# Patient Record
Sex: FEMALE | Race: WHITE | Employment: OTHER | URBAN - METROPOLITAN AREA
[De-identification: names, ages, dates, MRNs, and addresses within clinical notes are randomized per-mention and may not be internally consistent; named-entity substitution may affect disease eponyms.]

---

## 2020-02-10 ENCOUNTER — OFFICE VISIT (OUTPATIENT)
Dept: FAMILY MEDICINE CLINIC | Age: 65
End: 2020-02-10
Payer: COMMERCIAL

## 2020-02-10 VITALS
OXYGEN SATURATION: 96 % | HEART RATE: 74 BPM | HEIGHT: 65 IN | SYSTOLIC BLOOD PRESSURE: 107 MMHG | BODY MASS INDEX: 42.15 KG/M2 | DIASTOLIC BLOOD PRESSURE: 66 MMHG | RESPIRATION RATE: 16 BRPM | WEIGHT: 253 LBS

## 2020-02-10 PROCEDURE — 99202 OFFICE O/P NEW SF 15 MIN: CPT | Performed by: FAMILY MEDICINE

## 2020-02-10 RX ORDER — PANTOPRAZOLE SODIUM 40 MG/1
40 TABLET, DELAYED RELEASE ORAL DAILY
Qty: 90 TABLET | Refills: 1 | Status: SHIPPED | OUTPATIENT
Start: 2020-02-10 | End: 2020-06-16 | Stop reason: SDUPTHER

## 2020-02-10 RX ORDER — FOLIC ACID/MULTIVIT,IRON,MINER 0.4MG-18MG
500 TABLET ORAL
COMMUNITY

## 2020-02-10 RX ORDER — AMLODIPINE BESYLATE 5 MG/1
5 TABLET ORAL DAILY
COMMUNITY
Start: 2020-01-30 | End: 2020-02-10 | Stop reason: SDUPTHER

## 2020-02-10 RX ORDER — AMLODIPINE BESYLATE 5 MG/1
5 TABLET ORAL DAILY
Qty: 90 TABLET | Refills: 1 | Status: SHIPPED | OUTPATIENT
Start: 2020-02-10 | End: 2020-06-16 | Stop reason: SDUPTHER

## 2020-02-10 RX ORDER — ATENOLOL 50 MG/1
50 TABLET ORAL DAILY
COMMUNITY
Start: 2020-01-08 | End: 2020-02-10 | Stop reason: SDUPTHER

## 2020-02-10 RX ORDER — PANTOPRAZOLE SODIUM 40 MG/1
40 TABLET, DELAYED RELEASE ORAL DAILY
COMMUNITY
Start: 2019-12-22 | End: 2020-02-10 | Stop reason: SDUPTHER

## 2020-02-10 RX ORDER — ATENOLOL 50 MG/1
50 TABLET ORAL DAILY
Qty: 90 TABLET | Refills: 1 | Status: SHIPPED | OUTPATIENT
Start: 2020-02-10 | End: 2020-06-16 | Stop reason: SDUPTHER

## 2020-02-10 RX ORDER — PIOGLITAZONEHYDROCHLORIDE 30 MG/1
30 TABLET ORAL DAILY
Qty: 90 TABLET | Refills: 1 | Status: SHIPPED | OUTPATIENT
Start: 2020-02-10 | End: 2020-06-16 | Stop reason: SDUPTHER

## 2020-02-10 RX ORDER — PIOGLITAZONEHYDROCHLORIDE 30 MG/1
30 TABLET ORAL DAILY
COMMUNITY
Start: 2020-01-30 | End: 2020-02-10 | Stop reason: SDUPTHER

## 2020-02-10 SDOH — ECONOMIC STABILITY: TRANSPORTATION INSECURITY
IN THE PAST 12 MONTHS, HAS THE LACK OF TRANSPORTATION KEPT YOU FROM MEDICAL APPOINTMENTS OR FROM GETTING MEDICATIONS?: NO

## 2020-02-10 SDOH — ECONOMIC STABILITY: FOOD INSECURITY: WITHIN THE PAST 12 MONTHS, YOU WORRIED THAT YOUR FOOD WOULD RUN OUT BEFORE YOU GOT MONEY TO BUY MORE.: NEVER TRUE

## 2020-02-10 SDOH — ECONOMIC STABILITY: INCOME INSECURITY: HOW HARD IS IT FOR YOU TO PAY FOR THE VERY BASICS LIKE FOOD, HOUSING, MEDICAL CARE, AND HEATING?: NOT HARD AT ALL

## 2020-02-10 SDOH — ECONOMIC STABILITY: FOOD INSECURITY: WITHIN THE PAST 12 MONTHS, THE FOOD YOU BOUGHT JUST DIDN'T LAST AND YOU DIDN'T HAVE MONEY TO GET MORE.: NEVER TRUE

## 2020-02-10 SDOH — ECONOMIC STABILITY: TRANSPORTATION INSECURITY
IN THE PAST 12 MONTHS, HAS LACK OF TRANSPORTATION KEPT YOU FROM MEETINGS, WORK, OR FROM GETTING THINGS NEEDED FOR DAILY LIVING?: NO

## 2020-02-10 ASSESSMENT — ENCOUNTER SYMPTOMS
RESPIRATORY NEGATIVE: 1
GASTROINTESTINAL NEGATIVE: 1
EYES NEGATIVE: 1

## 2020-02-10 ASSESSMENT — PATIENT HEALTH QUESTIONNAIRE - PHQ9
SUM OF ALL RESPONSES TO PHQ QUESTIONS 1-9: 0
SUM OF ALL RESPONSES TO PHQ QUESTIONS 1-9: 0
SUM OF ALL RESPONSES TO PHQ9 QUESTIONS 1 & 2: 0
1. LITTLE INTEREST OR PLEASURE IN DOING THINGS: 0
2. FEELING DOWN, DEPRESSED OR HOPELESS: 0

## 2020-02-10 NOTE — PATIENT INSTRUCTIONS
Patient Education        Counting Carbohydrates: Care Instructions  Your Care Instructions    You don't have to eat special foods when you have diabetes. You just have to be careful to eat healthy foods. Carbohydrates (carbs) raise blood sugar higher and quicker than any other nutrient. Carbs are found in desserts, breads and cereals, and fruit. They're also in starchy vegetables. These include potatoes, corn, and grains such as rice and pasta. Carbs are also in milk and yogurt. The more carbs you eat at one time, the higher your blood sugar will rise. Spreading carbs all through the day helps keep your blood sugar levels within your target range. Counting carbs is one of the best ways to keep your blood sugar under control. If you use insulin, counting carbs helps you match the right amount of insulin to the number of grams of carbs in a meal. Then you can change your diet and insulin dose as needed. Testing your blood sugar several times a day can help you learn how carbs affect your blood sugar. A registered dietitian or certified diabetes educator can help you plan meals and snacks. Follow-up care is a key part of your treatment and safety. Be sure to make and go to all appointments, and call your doctor if you are having problems. It's also a good idea to know your test results and keep a list of the medicines you take. How can you care for yourself at home? Know your daily amount of carbohydrates  Your daily amount depends on several things, such as your weight, how active you are, which diabetes medicines you take, and what your goals are for your blood sugar levels. A registered dietitian or certified diabetes educator can help you plan how many carbs to include in each meal and snack. For most adults, a guideline for the daily amount of carbs is:  · 45 to 60 grams at each meal. That's about the same as 3 to 4 carbohydrate servings. · 15 to 20 grams at each snack.  That's about the same as 1 carbohydrate serving. Count carbs  Counting carbs lets you know how much rapid-acting insulin to take before you eat. If you use an insulin pump, you get a constant rate of insulin during the day. So the pump must be programmed at meals. This gives you extra insulin to cover the rise in blood sugar after meals. If you take insulin:  · Learn your own insulin-to-carb ratio. You and your diabetes health professional will figure out the ratio. You can do this by testing your blood sugar after meals. For example, you may need a certain amount of insulin for every 15 grams of carbs. · Add up the carb grams in a meal. Then you can figure out how many units of insulin to take based on your insulin-to-carb ratio. · Exercise lowers blood sugar. You can use less insulin than you would if you were not doing exercise. Keep in mind that timing matters. If you exercise within 1 hour after a meal, your body may need less insulin for that meal than it would if you exercised 3 hours after the meal. Test your blood sugar to find out how exercise affects your need for insulin. If you do or don't take insulin:  · Look at labels on packaged foods. This can tell you how many carbs are in a serving. You can also use guides from the American Diabetes Association. · Be aware of portions, or serving sizes. If a package has two servings and you eat the whole package, you need to double the number of grams of carbohydrate listed for one serving. · Protein, fat, and fiber do not raise blood sugar as much as carbs do. If you eat a lot of these nutrients in a meal, your blood sugar will rise more slowly than it would otherwise. Eat from all food groups  · Eat at least three meals a day. · Plan meals to include food from all the food groups. The food groups include grains, fruits, dairy, proteins, and vegetables. · Talk to your dietitian or diabetes educator about ways to add limited amounts of sweets into your meal plan.   · If you drink alcohol, talk to your doctor. It may not be recommended when you are taking certain diabetes medicines. Where can you learn more? Go to https://[email or web address].[email or web address]. org and sign in to your [employer] account. Enter E654 in the [employer] box to learn more about \"Counting Carbohydrates: Care Instructions. \"     If you do not have an account, please click on the \"Sign Up Now\" link. Current as of: April 16, 2019  Content Version: 12.3  © 5397-6799 Healthwise, Incorporated. Care instructions adapted under license by [facility] ([facility]). If you have questions about a medical condition or this instruction, always ask your healthcare professional. [address] any warranty or liability for your use of this information.

## 2020-02-10 NOTE — PROGRESS NOTES
[address] PRIMARY CARE  [address] [facility] [address]  Dept: [phone]  Dept Fax: [phone]    Nahomy Perrin is a 60 y.o. female who presents today for her medical conditions/complaints as noted below. Nahomy Perrin is c/o of   Chief Complaint   Patient presents with    New Patient         HPI:     The patient complains of intermittent muscle spasms, in her calves, arms, feet. The patient states it comes and goes and travels. Yesterday it was her left wrist.  She states the only thing that's different is her diet has been poor. She recently moved from [place]. Diabetes   She presents for her initial diabetic visit. She has type 2 diabetes mellitus. Her disease course has been stable. There are no hypoglycemic associated symptoms. There are no diabetic associated symptoms. There are no hypoglycemic complications. There are no diabetic complications. Risk factors for coronary artery disease include diabetes mellitus. Current diabetic treatment includes oral agent (dual therapy). She is compliant with treatment all of the time. No results found for: LABA1C          ( goal A1Cis < 7)   No results found for: LABMICR  No results found for: LDLCHOLESTEROL, LDLCALC    (goal LDL is <100)   No results found for: AST, ALT, BUN  BP Readings from Last 3 Encounters:   02/10/20 107/66          (goal 120/80)    No past medical history on file.    Past Surgical History:   Procedure Laterality Date    CHOLECYSTECTOMY      KNEE ARTHROSCOPY Left 1995    OTHER SURGICAL HISTORY      DNC for post menapausal bleeding    TONSILLECTOMY         Family History   Problem Relation Age of Onset    Breast Cancer Mother     Heart Defect Mother         Murmur     Alzheimer's Disease Mother     Diabetes type 2  Mother     Heart Disease Father     Other Father         AA       Social History     Tobacco Use    Smoking status: Former Smoker     Packs/day: 2.00 Years: 44.00     Pack years: 88.00     Last attempt to quit: 2019     Years since quittin.6    Smokeless tobacco: Never Used   Substance Use Topics    Alcohol use: Not Currently      Current Outpatient Medications   Medication Sig Dispense Refill    amLODIPine (NORVASC) 5 MG tablet Take 5 mg by mouth daily      atenolol (TENORMIN) 50 MG tablet Take 50 mg by mouth daily      pantoprazole (PROTONIX) 40 MG tablet Take 40 mg by mouth daily      pioglitazone (ACTOS) 30 MG tablet Take 30 mg by mouth daily      metFORMIN (GLUCOPHAGE) 500 MG tablet Take 500 mg by mouth 2 times daily (with meals)      aspirin 81 MG tablet Take 81 mg by mouth daily      Krill Oil 350 MG CAPS Take by mouth       No current facility-administered medications for this visit. Allergies   Allergen Reactions    Augmentin [Amoxicillin-Pot Clavulanate] Nausea And Vomiting    Bactrim [Sulfamethoxazole-Trimethoprim] Nausea And Vomiting    Vancomycin Rash    Dolobid [Diflunisal] Nausea And Vomiting       Health Maintenance   Topic Date Due    Hepatitis C screen  1955    DTaP/Tdap/Td vaccine (1 - Tdap) 1968    HIV screen  1972    Cervical cancer screen  1978    Lipid screen  1997    Breast cancer screen  1997    Diabetes screen  1997    Shingles Vaccine (1 of 2) 2007    Colon cancer screen colonoscopy  2007    Low dose CT lung screening  2012    Flu vaccine (1) 2019    Hepatitis A vaccine  Aged Out    Hepatitis B vaccine  Aged Out    Hib vaccine  Aged Out    Meningococcal (ACWY) vaccine  Aged Out    Pneumococcal 0-64 years Vaccine  Aged Out       Subjective:     Review of Systems   Constitutional: Negative. HENT: Negative. Eyes: Negative. Respiratory: Negative. Cardiovascular: Negative. Gastrointestinal: Negative. Genitourinary: Negative. Musculoskeletal: Negative. Skin: Negative.     Allergic/Immunologic: Negative for environmental allergies, food allergies and immunocompromised state. Neurological: Negative. Psychiatric/Behavioral: Negative. Objective:     Physical Exam  Vitals signs reviewed. Constitutional:       General: She is awake. She is not in acute distress. Appearance: Normal appearance. She is obese. She is not toxic-appearing. HENT:      Head: Normocephalic and atraumatic. Right Ear: Tympanic membrane, ear canal and external ear normal.      Left Ear: Tympanic membrane, ear canal and external ear normal.      Nose: Nose normal.      Mouth/Throat:      Mouth: Mucous membranes are moist.   Eyes:      Extraocular Movements: Extraocular movements intact. Conjunctiva/sclera: Conjunctivae normal.      Pupils: Pupils are equal, round, and reactive to light. Neck:      Musculoskeletal: Normal range of motion and neck supple. Comments: Patient has a soft lump in the submandibular area  Cardiovascular:      Rate and Rhythm: Normal rate and regular rhythm. Pulses: Normal pulses. Heart sounds: Normal heart sounds. No murmur. Pulmonary:      Effort: Pulmonary effort is normal.      Breath sounds: Normal breath sounds. Abdominal:      General: Abdomen is flat. Bowel sounds are normal.      Palpations: Abdomen is soft. Musculoskeletal: Normal range of motion. Neurological:      General: No focal deficit present. Mental Status: She is alert and oriented to person, place, and time. Psychiatric:         Attention and Perception: Attention normal.         Mood and Affect: Mood and affect normal.         Speech: Speech normal.         Behavior: Behavior normal.       /66   Pulse 74   Resp 16   Ht 5' 5\" (1.651 m)   Wt 253 lb (114.8 kg)   SpO2 96%   BMI 42.10 kg/m²     Assessment:       Diagnosis Orders   1.  Type 2 diabetes mellitus without complication, without long-term current use of insulin ([facility])  Hemoglobin A1C    Comprehensive Metabolic Panel, Fasting Standing Expiration Date:   2/10/2021    Magnesium     Standing Status:   Future     Standing Expiration Date:   2/10/2021     No orders of the defined types were placed in this encounter. Patient given educational materials - see patient instructions. Discussed use, benefit, and side effects of prescribed medications. All patientquestions answered. Pt voiced understanding. Reviewed health maintenance. Instructedto continue current medications, diet and exercise. Patient agreed with treatmentplan. Follow up as directed.      Electronically signed by Noreen Thomas MD on 2/10/2020 at 1:25 PM

## 2020-02-18 ENCOUNTER — HOSPITAL ENCOUNTER (OUTPATIENT)
Age: 65
Setting detail: SPECIMEN
Discharge: HOME | End: 2020-02-18
Payer: COMMERCIAL

## 2020-02-18 LAB
ABSOLUTE EOS #: 0.12 K/UL (ref 0–0.44)
ABSOLUTE IMMATURE GRANULOCYTE: <0.03 K/UL (ref 0–0.3)
ABSOLUTE LYMPH #: 2.14 K/UL (ref 1.1–3.7)
ABSOLUTE MONO #: 0.52 K/UL (ref 0.1–1.2)
ALBUMIN SERPL-MCNC: 4.3 G/DL (ref 3.5–5.2)
ALBUMIN/GLOBULIN RATIO: 1.4 (ref 1–2.5)
ALP BLD-CCNC: 80 U/L (ref 35–104)
ALT SERPL-CCNC: 24 U/L (ref 5–33)
ANION GAP SERPL CALCULATED.3IONS-SCNC: 19 MMOL/L (ref 9–17)
AST SERPL-CCNC: 20 U/L
BASOPHILS # BLD: 1 % (ref 0–2)
BASOPHILS ABSOLUTE: 0.05 K/UL (ref 0–0.2)
BILIRUB SERPL-MCNC: 0.62 MG/DL (ref 0.3–1.2)
BUN BLDV-MCNC: 9 MG/DL (ref 8–23)
BUN/CREAT BLD: ABNORMAL (ref 9–20)
CALCIUM SERPL-MCNC: 9.2 MG/DL (ref 8.6–10.4)
CHLORIDE BLD-SCNC: 97 MMOL/L (ref 98–107)
CHOLESTEROL, FASTING: 236 MG/DL
CHOLESTEROL/HDL RATIO: 3.9
CO2: 21 MMOL/L (ref 20–31)
CREAT SERPL-MCNC: 0.57 MG/DL (ref 0.5–0.9)
DIFFERENTIAL TYPE: ABNORMAL
EOSINOPHILS RELATIVE PERCENT: 2 % (ref 1–4)
ESTIMATED AVERAGE GLUCOSE: 306 MG/DL
GFR AFRICAN AMERICAN: >60 ML/MIN
GFR NON-AFRICAN AMERICAN: >60 ML/MIN
GFR SERPL CREATININE-BSD FRML MDRD: ABNORMAL ML/MIN/{1.73_M2}
GFR SERPL CREATININE-BSD FRML MDRD: ABNORMAL ML/MIN/{1.73_M2}
GLUCOSE FASTING: 328 MG/DL (ref 70–99)
HBA1C MFR BLD: 12.3 % (ref 4–6)
HCT VFR BLD CALC: 45.9 % (ref 36.3–47.1)
HDLC SERPL-MCNC: 61 MG/DL
HEMOGLOBIN: 15.2 G/DL (ref 11.9–15.1)
IMMATURE GRANULOCYTES: 0 %
LDL CHOLESTEROL: 140 MG/DL (ref 0–130)
LYMPHOCYTES # BLD: 31 % (ref 24–43)
MAGNESIUM: 1.7 MG/DL (ref 1.6–2.6)
MCH RBC QN AUTO: 31.3 PG (ref 25.2–33.5)
MCHC RBC AUTO-ENTMCNC: 33.1 G/DL (ref 28.4–34.8)
MCV RBC AUTO: 94.6 FL (ref 82.6–102.9)
MONOCYTES # BLD: 8 % (ref 3–12)
NRBC AUTOMATED: 0 PER 100 WBC
PDW BLD-RTO: 12.7 % (ref 11.8–14.4)
PLATELET # BLD: ABNORMAL K/UL (ref 138–453)
PLATELET ESTIMATE: ABNORMAL
PLATELET, FLUORESCENCE: 137 K/UL (ref 138–453)
PLATELET, IMMATURE FRACTION: 5 % (ref 1.1–10.3)
PMV BLD AUTO: ABNORMAL FL (ref 8.1–13.5)
POTASSIUM SERPL-SCNC: 4.3 MMOL/L (ref 3.7–5.3)
RBC # BLD: 4.85 M/UL (ref 3.95–5.11)
RBC # BLD: ABNORMAL 10*6/UL
SEG NEUTROPHILS: 58 % (ref 36–65)
SEGMENTED NEUTROPHILS ABSOLUTE COUNT: 3.99 K/UL (ref 1.5–8.1)
SODIUM BLD-SCNC: 137 MMOL/L (ref 135–144)
TOTAL PROTEIN: 7.3 G/DL (ref 6.4–8.3)
TRIGLYCERIDE, FASTING: 175 MG/DL
TSH SERPL DL<=0.05 MIU/L-ACNC: 2.06 MIU/L (ref 0.3–5)
VLDLC SERPL CALC-MCNC: ABNORMAL MG/DL (ref 1–30)
WBC # BLD: 6.8 K/UL (ref 3.5–11.3)
WBC # BLD: ABNORMAL 10*3/UL

## 2020-03-30 ENCOUNTER — TELEPHONE (OUTPATIENT)
Dept: FAMILY MEDICINE CLINIC | Age: 65
End: 2020-03-30

## 2020-04-01 ENCOUNTER — VIRTUAL VISIT (OUTPATIENT)
Dept: FAMILY MEDICINE CLINIC | Age: 65
End: 2020-04-01
Payer: COMMERCIAL

## 2020-04-01 ENCOUNTER — TELEPHONE (OUTPATIENT)
Dept: FAMILY MEDICINE CLINIC | Age: 65
End: 2020-04-01

## 2020-04-01 PROBLEM — E11.9 TYPE 2 DIABETES MELLITUS WITHOUT COMPLICATION (HCC): Status: ACTIVE | Noted: 2020-04-01

## 2020-04-01 PROBLEM — I10 ESSENTIAL HYPERTENSION: Status: ACTIVE | Noted: 2020-04-01

## 2020-04-01 PROBLEM — R25.2 MUSCLE CRAMPS: Status: ACTIVE | Noted: 2020-04-01

## 2020-04-01 PROCEDURE — 99442 PR PHYS/QHP TELEPHONE EVALUATION 11-20 MIN: CPT | Performed by: FAMILY MEDICINE

## 2020-04-01 RX ORDER — CIPROFLOXACIN 500 MG/1
500 TABLET, FILM COATED ORAL 2 TIMES DAILY
Qty: 20 TABLET | Refills: 0 | Status: SHIPPED | OUTPATIENT
Start: 2020-04-01 | End: 2020-04-11

## 2020-04-01 ASSESSMENT — ENCOUNTER SYMPTOMS
SINUS PRESSURE: 0
RESPIRATORY NEGATIVE: 1
FACIAL SWELLING: 1
SINUS PAIN: 0
RHINORRHEA: 0

## 2020-04-01 NOTE — PROGRESS NOTES
into the left ear 2 times daily for 7 days Yes Larry Jimenez MD   ciprofloxacin (CIPRO) 500 MG tablet Take 1 tablet by mouth 2 times daily for 10 days Yes Larry Jimenez MD   aspirin 81 MG tablet Take 81 mg by mouth daily Yes Historical Provider, MD Gaona Lozano Oil 350 MG CAPS Take by mouth Yes Historical Provider, MD   amLODIPine (NORVASC) 5 MG tablet Take 1 tablet by mouth daily Yes Larry Jimenez MD   atenolol (TENORMIN) 50 MG tablet Take 1 tablet by mouth daily Yes Larry Jimenez MD   pantoprazole (PROTONIX) 40 MG tablet Take 1 tablet by mouth daily Yes Larry Jimenez MD   pioglitazone (ACTOS) 30 MG tablet Take 1 tablet by mouth daily Yes Larry Jimenez MD   metFORMIN (GLUCOPHAGE) 500 MG tablet Take 1 tablet by mouth 2 times daily (with meals) Yes Larry Jimenez MD       Social History     Tobacco Use    Smoking status: Former Smoker     Packs/day: 2.00     Years: 44.00     Pack years: 88.00     Last attempt to quit: 2019     Years since quittin.8    Smokeless tobacco: Never Used   Substance Use Topics    Alcohol use: Not Currently    Drug use: Never        Allergies   Allergen Reactions    Augmentin [Amoxicillin-Pot Clavulanate] Nausea And Vomiting    Bactrim [Sulfamethoxazole-Trimethoprim] Nausea And Vomiting    Vancomycin Rash    Dolobid [Diflunisal] Nausea And Vomiting   , No past medical history on file.     PHYSICAL EXAMINATION:  [ INSTRUCTIONS:  \"[x]\" Indicates a positive item  \"[]\" Indicates a negative item  -- DELETE ALL ITEMS NOT EXAMINED]  Vital Signs: (As obtained by patient/caregiver or practitioner observation)    Blood pressure-  Heart rate-    Respiratory rate-    Temperature-  Pulse oximetry-     Constitutional: [] Appears well-developed and well-nourished [x] No apparent distress      [] Abnormal-   Mental status  [] Alert and awake  [x] Oriented to person/place/time [x]Able to follow commands      Eyes:  EOM    []  Normal  [] Abnormal-  Sclera  []  Normal  [] Abnormal -         Discharge []  None visible  [] Abnormal -    HENT:   [] Normocephalic, atraumatic. [] Abnormal   [] Mouth/Throat: Mucous membranes are moist.     External Ears [] Normal  [] Abnormal-     Neck: [] No visualized mass     Pulmonary/Chest: [] Respiratory effort normal.  [] No visualized signs of difficulty breathing or respiratory distress        [] Abnormal-      Musculoskeletal:   [] Normal gait with no signs of ataxia         [] Normal range of motion of neck        [] Abnormal-       Neurological:        [] No Facial Asymmetry (Cranial nerve 7 motor function) (limited exam to video visit)          [] No gaze palsy        [] Abnormal-         Skin:        [] No significant exanthematous lesions or discoloration noted on facial skin         [] Abnormal-            Psychiatric:       [] Normal Affect [] No Hallucinations        [] Abnormal-     Other pertinent observable physical exam findings-     ASSESSMENT/PLAN:  1. Acute swimmer's ear of left side  Due to patient's history of diabetes and the concern for otitis externa becoming more severe and turning into malignant otitis externa I am going to start her on oral and ear drop antibiotics. The patient was educated about the proper use and when to contact us or go to ER.    - ciprofloxacin-dexamethasone (CIPRODEX) 0.3-0.1 % otic suspension; Place 4 drops into the left ear 2 times daily for 7 days  Dispense: 7.5 mL; Refill: 0  - ciprofloxacin (CIPRO) 500 MG tablet; Take 1 tablet by mouth 2 times daily for 10 days  Dispense: 20 tablet; Refill: 0      No follow-ups on file. Marla Antoine is a 60 y.o. female being evaluated by a telephone encounter to address concerns as mentioned above. A caregiver was present when appropriate. Due to this being a TeleHealth encounter (During WTCVX-05 public health emergency), evaluation of the following organ systems was limited: Vitals/Constitutional/EENT/Resp/CV/GI//MS/Neuro/Skin/Heme-Lymph-Imm.   Pursuant to the emergency declaration under the [facility], [facility] waiver authority and the [employer] and [employer] Act, this Virtual Visit was conducted with patient's (and/or legal guardian's) consent, to reduce the patient's risk of exposure to COVID-19 and provide necessary medical care. The patient (and/or legal guardian) has also been advised to contact this office for worsening conditions or problems, and seek emergency medical treatment and/or call 911 if deemed necessary. Services were provided through a telephone synchronous discussion virtually to substitute for in-person clinic visit. Patient and provider were located at their individual homes. I SPENT 11-20 MINUTES DISCUSSING THE CONCERN WITH THE PATIENT AND TAKING CARE OF THIS PATIENT.  --oRnald Arzate MD on 4/1/2020 at 10:28 AM    An electronic signature was used to authenticate this note.   Ronald Arzate

## 2020-04-10 ENCOUNTER — TELEPHONE (OUTPATIENT)
Dept: FAMILY MEDICINE CLINIC | Age: 65
End: 2020-04-10

## 2020-04-10 NOTE — TELEPHONE ENCOUNTER
List:     Essential hypertension     Muscle cramps     Type 2 diabetes mellitus without complication ([facility] [address]

## 2020-06-16 RX ORDER — PANTOPRAZOLE SODIUM 40 MG/1
40 TABLET, DELAYED RELEASE ORAL DAILY
Qty: 90 TABLET | Refills: 1 | Status: SHIPPED | OUTPATIENT
Start: 2020-06-16 | End: 2020-11-02

## 2020-06-16 RX ORDER — PIOGLITAZONEHYDROCHLORIDE 30 MG/1
30 TABLET ORAL DAILY
Qty: 90 TABLET | Refills: 1 | Status: SHIPPED | OUTPATIENT
Start: 2020-06-16 | End: 2020-12-21

## 2020-06-16 RX ORDER — AMLODIPINE BESYLATE 5 MG/1
5 TABLET ORAL DAILY
Qty: 90 TABLET | Refills: 1 | Status: SHIPPED | OUTPATIENT
Start: 2020-06-16 | End: 2021-07-07 | Stop reason: ALTCHOICE

## 2020-06-16 RX ORDER — ATENOLOL 50 MG/1
50 TABLET ORAL DAILY
Qty: 90 TABLET | Refills: 1 | Status: SHIPPED | OUTPATIENT
Start: 2020-06-16 | End: 2020-11-02

## 2020-06-16 NOTE — TELEPHONE ENCOUNTER
Next Visit Date:  Future Appointments   Date Time Provider Don Tolbert   9/28/2020 10:30 AM STA SCR MAMMO RM 2 STAZ MAMMO STA [facility]iolog   9/28/2020 11:00 AM STA DEXA RM STAZ MAMMO STA Radiolog       Health Maintenance   Topic Date Due    Hepatitis C screen  1955    Pneumococcal 0-64 years Vaccine (1 of 1 - PPSV23) 08/17/1963    Diabetic foot exam  08/17/1967    Diabetic retinal exam  08/17/1967    HIV screen  08/17/1972    Diabetic microalbuminuria test  08/17/1975    DTaP/Tdap/Td vaccine (1 - Tdap) 08/17/1976    Cervical cancer screen  08/17/1978    Shingles Vaccine (1 of 2) 08/17/2007    Colon cancer screen colonoscopy  08/17/2007    Low dose CT lung screening  08/17/2012    Breast cancer screen  04/02/2020    A1C test (Diabetic or Prediabetic)  05/18/2020    Lipid screen  02/18/2021    Potassium monitoring  02/18/2021    Creatinine monitoring  02/18/2021    Flu vaccine  Completed    Hepatitis A vaccine  Aged Out    Hib vaccine  Aged Out    Meningococcal (ACWY) vaccine  Aged Out       Hemoglobin A1C (%)   Date Value   02/18/2020 12.3 (H)             ( goal A1C is < 7)   No results found for: LABMICR  LDL Cholesterol (mg/dL)   Date Value   02/18/2020 140 (H)       (goal LDL is <100)   AST (U/L)   Date Value   02/18/2020 20     ALT (U/L)   Date Value   02/18/2020 24     BUN (mg/dL)   Date Value   02/18/2020 9     BP Readings from Last 3 Encounters:   02/10/20 107/66          (goal 120/80)    All Future Testing planned in [facility]  Lab Frequency Next Occurrence   HARI DIGITAL SCREEN W CAD BILATERAL Once 04/10/2020   DEXA BONE DENSITY 2 SITES Once 02/10/2020               Patient Active Problem List:     Essential hypertension     Muscle cramps     Type 2 diabetes mellitus without complication ([address]

## 2020-07-06 ENCOUNTER — OFFICE VISIT (OUTPATIENT)
Dept: FAMILY MEDICINE CLINIC | Age: 65
End: 2020-07-06
Payer: COMMERCIAL

## 2020-07-06 ENCOUNTER — HOSPITAL ENCOUNTER (OUTPATIENT)
Age: 65
Setting detail: SPECIMEN
Discharge: HOME | End: 2020-07-06
Payer: COMMERCIAL

## 2020-07-06 VITALS
TEMPERATURE: 97 F | DIASTOLIC BLOOD PRESSURE: 69 MMHG | HEIGHT: 65 IN | SYSTOLIC BLOOD PRESSURE: 126 MMHG | BODY MASS INDEX: 42.82 KG/M2 | HEART RATE: 86 BPM | RESPIRATION RATE: 16 BRPM | WEIGHT: 257 LBS

## 2020-07-06 LAB — HBA1C MFR BLD: 14 %

## 2020-07-06 PROCEDURE — 83036 HEMOGLOBIN GLYCOSYLATED A1C: CPT | Performed by: FAMILY MEDICINE

## 2020-07-06 PROCEDURE — 99396 PREV VISIT EST AGE 40-64: CPT | Performed by: FAMILY MEDICINE

## 2020-07-06 NOTE — PROGRESS NOTES
[place]  2020              60 y.o. Chief Complaint   Patient presents with    Gynecologic Exam              No referring provider defined for this encounter. The patient was seen and examined. She has no chief complaint today and is here for her annual exam.     LMP was 15 years ago  Menarche 17years old  She had regular cycles when she was menstruating. The patient is not currently sexually active. Last sexually active was 8 years ago. The patient has never had an abnormal pap. The patient is doing self-breast exams on a fairly frequent basis. HPI :for wellness physical  ________________________________________________________________________ Patient has not been checking her blood sugars. She did gain weight during the quarantine and has been inactive. She is taking her medications. Past Medical History:  DM-2 poorly controlled                                                              Past Surgical History:   Procedure Laterality Date    CHOLECYSTECTOMY      KNEE ARTHROSCOPY Left     OTHER SURGICAL HISTORY      DNC for post menapausal bleeding    TONSILLECTOMY       Family History   Problem Relation Age of Onset    Breast Cancer Mother     Heart Defect Mother         Murmur     Alzheimer's Disease Mother     Diabetes type 2  Mother     Heart Disease Father     Other Father         AA     Social History     Tobacco Use   Smoking Status Former Smoker    Packs/day: 2.00    Years: 44.00    Pack years: 88.00    Last attempt to quit: 2019    Years since quittin.0   Smokeless Tobacco Never Used     Social History     Substance and Sexual Activity   Alcohol Use Not Currently     Current Outpatient Medications   Medication Sig Dispense Refill    zoster recombinant adjuvanted vaccine (SHINGRIX) 50 MCG/0.5ML SUSR injection Inject 0.5 mLs into the muscle once for 1 dose 50 MCG IM then repeat 2-6 months.  1 each 1    pantoprazole (PROTONIX) 40 MG tablet Take 1 tablet by mouth daily 90 tablet 1    amLODIPine (NORVASC) 5 MG tablet Take 1 tablet by mouth daily 90 tablet 1    atenolol (TENORMIN) 50 MG tablet Take 1 tablet by mouth daily 90 tablet 1    metFORMIN (GLUCOPHAGE) 500 MG tablet Take 1 tablet by mouth 2 times daily (with meals) 180 tablet 1    pioglitazone (ACTOS) 30 MG tablet Take 1 tablet by mouth daily 90 tablet 1    aspirin 81 MG tablet Take 81 mg by mouth daily      Krill Oil 350 MG CAPS Take by mouth       No current facility-administered medications for this visit. Augmentin [amoxicillin-pot clavulanate]; Bactrim [sulfamethoxazole-trimethoprim]; Vancomycin; and Dolobid [diflunisal]        Patient Active Problem List   Diagnosis    Essential hypertension    Muscle cramps    Type 2 diabetes mellitus without complication ([facility] [address]       REVIEW OF SYSTEMS:        A minimum of an eleven point review of systems was completed and found to be negative except for the pertinent positives found below. Constitutional: No fever, chills or malaise;  fatigue  Head and Eyes: No  Headache, Dizziness or trauma in last 12 months  ENT ROS: No hearing, Tinnitis, sinus problems  Hematological and Lymphatic ROS : no gland swelling , no h/o Bleeding   Psych ROS: No Depression,  or anxiety  Breast ROS: No prior breast abnormalities or lumps  Respiratory ROS: No SOB,,Cough,   Cardiovascular ROS: No Chest Pain with Exertion, Palpitations, Syncope, Edema, Arrhythmia  Gastrointestinal ROS: No Indigestion, Heartburn, Nausea, vomiting, Diahrea, Constipation,or Bowel Changes; No Bloody Stools or melena  Genito-Urinary ROS: No Dysuria, Hematuria or Nocturia.  No Urinary Incontinence or Vaginal Discharge  Musculoskeletal ROS: No Arthralgia,or Rheumatism  Neurological ROS: No CVA, Migraines, Epilepsy, Seizure Hx, or Limb Weakness  Dermatological ROS: No Rash, Itching, Hives, Mole Changes PHYSICAL Exam:     Constitutional:  Blood pressure 126/69, pulse 86, temperature 97 °F (36.1 °C), temperature source Temporal, resp. rate 16, height 5' 5\" (1.651 m), weight 257 lb (116.6 kg). General Appearance: This  is a well Developed, well Nourished, well groomed female. Her BMI was reviewed. Skin:  There was a Normal Inspection of the skin without rashes or lesions. There were no rashes. (Papular, Maculopapular, Hives, Pustular, Macular)     There were no lesions (Ulcers, Erythema, Abn. Appearing Nevi)      Lymphatic:  No Lymph Nodes were Palpable in the neck , axilla      Respiratory: The lungs were auscultated and found to be clear. There were no rales, rhonchi or wheezes. There was a good respiratory effort. Cardiovascular: The heart was in a regular rate and rhythm. . No S3 or S4. There was no murmur appreciated. Location, grade, and radiation are not applicable. Extremities: The patients extremities were without edema,   There was full range of motion in all four extremities. Abdomen: The abdomen was soft and non-tender. There were good bowel sounds in all quadrants and   there was no guarding, rebound or rigidity. On evaluation there was no evidence of hepatosplenomegaly   No hernias were appreciated. Abdominal Scars: n/a     Psych: The patient had a normal Orientation to: Time, Place, Person, and Situation  There is no Mood / Affect changes    Breast:  (Chest)  normal appearance, no masses or tenderness  Self breast exams were reviewed in detail.      Pelvic Exam:      External genitalia:  No swelling, masses, lymphadenopathy appreciated    Vagina: no abnormalities, erythema or discharge appreciated    Cervix: visualized, no abnormalities, masses, changes appreciated     Uterus: No enlargement on bimanual    Adnexae: no enlargement      Neuromuscular:  Grossly intact    ASSESSMENT:      60 y.o. Annual  1. Well woman exam    2. Encounter for screening mammogram for breast cancer    3. Cervical cancer screening    4. Post-menopausal    5. Osteoporosis screening    6. Need for prophylactic vaccination and inoculation against varicella    7. Type 2 diabetes mellitus without complication, without long-term current use of insulin ([facility] [address]        Diagnosis Orders   1. Well woman exam  PAP SMEAR   2. Encounter for screening mammogram for breast cancer     3. Cervical cancer screening  PAP SMEAR   4. Post-menopausal     5. Osteoporosis screening     6. Need for prophylactic vaccination and inoculation against varicella  zoster recombinant adjuvanted vaccine (SHINGRIX) 50 MCG/0.5ML SUSR injection   7. Type 2 diabetes mellitus without complication, without long-term current use of insulin ([facility])   DIABETES FOOT EXAM    Microalbumin, Ur    POCT glycosylated hemoglobin (Hb A1C)    Lipid, Fasting    Comprehensive Metabolic Panel, Fasting       Chief Complaint   Patient presents with    Gynecologic Exam               Hereditary Breast, Ovarian, Colon and Uterine Cancer screening Done. Tobacco & Secondary smoke risks reviewed; instructed on cessation and avoidance      Counseling Completed:      PLAN:   DM-2 - a1c 14 today. Patient recommended to start insulin today but she refuses d/t not liking needles. Advised her that is the next step but she continues to decline. Patient advised to make sure she is really engaged in active lifestyle changes including decreasing her carb intake. Patient to return in 6 weeks for recheck. Start jardiance daily continue metformin and actos. [x]  recommended pap test every year - 5 if co-testing ordered and performed years  for cervix cancer screening . [x]  Mammograms every 1 yearafter age 36 . For breast cancer screening .  Advised self breast exam . [x] Discussed dexa scan for bone density screening . Discussed  calcium and vitamin d supplement. [x]  Discussed colon cancer screening recommenda         Colonoscopy screening  And other alternatives offered . Screening starting at age 48 . Colonoscopy every 10 years OR FOBT yearly       [x] Discussed healthy lifestyle  And maintaining BMI in healthy range . Ideal bmi below 25 . Reasonable below 27 . Obese above 30 . Morbidly obese above 40 .            Offered help with diet  and exercise counseling

## 2020-07-09 LAB
HPV SAMPLE: NORMAL
HPV, GENOTYPE 16: NOT DETECTED
HPV, GENOTYPE 18: NOT DETECTED
HPV, HIGH RISK OTHER: NOT DETECTED
HPV, INTERPRETATION: NORMAL
SPECIMEN DESCRIPTION: NORMAL

## 2020-07-10 LAB — CYTOLOGY REPORT: NORMAL

## 2020-07-12 ENCOUNTER — APPOINTMENT (OUTPATIENT)
Dept: GENERAL RADIOLOGY | Age: 65
End: 2020-07-12
Payer: COMMERCIAL

## 2020-07-12 ENCOUNTER — HOSPITAL ENCOUNTER (EMERGENCY)
Age: 65
Discharge: HOME | End: 2020-07-12
Attending: SPECIALIST
Payer: COMMERCIAL

## 2020-07-12 VITALS
OXYGEN SATURATION: 94 % | HEART RATE: 76 BPM | TEMPERATURE: 97.7 F | HEIGHT: 65 IN | BODY MASS INDEX: 42.15 KG/M2 | DIASTOLIC BLOOD PRESSURE: 78 MMHG | SYSTOLIC BLOOD PRESSURE: 132 MMHG | RESPIRATION RATE: 16 BRPM | WEIGHT: 253 LBS

## 2020-07-12 PROCEDURE — 73610 X-RAY EXAM OF ANKLE: CPT

## 2020-07-12 PROCEDURE — 99283 EMERGENCY DEPT VISIT LOW MDM: CPT

## 2020-07-12 RX ORDER — IBUPROFEN 600 MG/1
600 TABLET ORAL EVERY 6 HOURS PRN
Qty: 20 TABLET | Refills: 0 | Status: SHIPPED | OUTPATIENT
Start: 2020-07-12 | End: 2020-08-20

## 2020-07-12 ASSESSMENT — PAIN DESCRIPTION - PAIN TYPE: TYPE: ACUTE PAIN

## 2020-07-12 ASSESSMENT — PAIN DESCRIPTION - ONSET: ONSET: ON-GOING

## 2020-07-12 ASSESSMENT — PAIN - FUNCTIONAL ASSESSMENT: PAIN_FUNCTIONAL_ASSESSMENT: ACTIVITIES ARE NOT PREVENTED

## 2020-07-12 ASSESSMENT — PAIN DESCRIPTION - DESCRIPTORS: DESCRIPTORS: SHARP;STABBING

## 2020-07-12 ASSESSMENT — PAIN DESCRIPTION - PROGRESSION: CLINICAL_PROGRESSION: NOT CHANGED

## 2020-07-12 ASSESSMENT — PAIN DESCRIPTION - ORIENTATION: ORIENTATION: RIGHT

## 2020-07-12 ASSESSMENT — PAIN DESCRIPTION - LOCATION: LOCATION: ANKLE

## 2020-07-12 ASSESSMENT — PAIN SCALES - GENERAL: PAINLEVEL_OUTOF10: 8

## 2020-07-12 ASSESSMENT — PAIN DESCRIPTION - FREQUENCY: FREQUENCY: INTERMITTENT

## 2020-07-12 NOTE — ED PROVIDER NOTES
[address]      Pt Name: Christopher Ch  MRN: [id number]  [place] 1955  Date of evaluation: 7/12/20      CHIEF COMPLAINT       Chief Complaint   Patient presents with    Ankle Pain     c/o right ankle pain that pt describes as sharp stabbing. Pt sts it started yesterday           HISTORY OF PRESENT ILLNESS    Christopher Ch is a 60 y.o. female who presents to the emergency department complaining of pain on the anterior aspect of the right ankle since yesterday afternoon. Pain comes on suddenly and last anywhere from 1 second to up to 10 to 15 seconds at a time and resolves on its own. Patient has not been able to sleep through the night as pain recurs every few minutes. She denies any fall or injuries and denies any swelling of the ankle. She has history of diabetic neuropathy and has slight tingling and numbness in bilateral feet due to diabetic neuropathy which is unchanged. She has taken about 6 tablets of aspirin 325 mg each without much relief. She has applied ice packs locally, topical anesthetics and soaked her ankle in the Epsom salt without any relief. She denies any previous injuries to the right ankle. She denies any calf pain, swelling, chest pain, shortness of breath, fever or chills. Pain is sharp and stabbing and intermittent. There are no exacerbating or relieving factors. REVIEW OF SYSTEMS       Review of Systems    All systems reviewed and negative unless noted in HPI. The patient denies fever or constitutional symptoms. Denies any sore throat or rhinorrhea. Denies any neck pain or stiffness. Denies chest pain or shortness of breath. No nausea,  vomiting or diarrhea. Denies any dysuria. Denies urinary frequency or hematuria. Denies any weakness, numbness or focal neurologic deficit. Denies any skin rash or edema. No recent psychiatric issues. No easy bruising or bleeding.      PAST MEDICAL HISTORY    has a INITIAL VITALS:  height is 5' 5\" (1.651 m) and weight is 114.8 kg (253 lb). Her oral temperature is 97.7 °F (36.5 °C). Her blood pressure is 158/83 (abnormal) and her pulse is 76. Her respiration is 16 and oxygen saturation is 95%. Physical Exam  Vitals signs and nursing note reviewed. Constitutional:       Appearance: She is well-developed. HENT:      Head: Normocephalic and atraumatic. Nose: Nose normal.   Eyes:      Extraocular Movements: Extraocular movements intact. Pupils: Pupils are equal, round, and reactive to light. Neck:      Musculoskeletal: Normal range of motion and neck supple. Cardiovascular:      Rate and Rhythm: Normal rate and regular rhythm. Heart sounds: Normal heart sounds. No murmur. Pulmonary:      Effort: Pulmonary effort is normal. No respiratory distress. Breath sounds: Normal breath sounds. Abdominal:      General: Bowel sounds are normal. There is no distension. Palpations: Abdomen is soft. Tenderness: There is no abdominal tenderness. Musculoskeletal:      Right ankle: No lateral malleolus, no medial malleolus and no head of 5th metatarsal tenderness found. Achilles tendon normal.      Comments: Patient has mild vague tenderness on the anterior aspect of the right ankle. She has good bilateral pedal pulses symmetrical.  There is no tenderness on the medial or lateral malleolar area of the right ankle. Neurovascular examination is intact distally. Skin:     General: Skin is warm and dry. Neurological:      General: No focal deficit present. Mental Status: She is alert and oriented to person, place, and time.            DIFFERENTIAL DIAGNOSIS/ MDM:     Right ankle pain etiology undetermined, diabetic neuropathy, ankle sprain    DIAGNOSTIC RESULTS     EKG: All EKG's are interpreted by the Emergency Department Physician who either signs or Co-signs this chart in the absence of a cardiologist.    None obtained    RADIOLOGY: symptoms    [facility] ED  [address] [facility]. [address] [place] [address]  [phone]    If symptoms worsen      DISCHARGE MEDICATIONS:  New Prescriptions    IBUPROFEN (IBU) 600 MG TABLET    Take 1 tablet by mouth every 6 hours as needed for Pain       (Please note that portions of this note were completed with a voice recognition program.  Efforts were made to edit the dictations but occasionally words are mis-transcribed.)    Mak MD, F.A.C.E.P.   Attending Emergency Medicine Physician      Veronique Blair MD  07/12/20 [id number]

## 2020-07-12 NOTE — ED NOTES
Pt rating pain 8/10. Pt refusing anything for pain at this time.      Smiley Pedersen RN  07/12/20 [id number]

## 2020-08-20 ENCOUNTER — OFFICE VISIT (OUTPATIENT)
Dept: FAMILY MEDICINE CLINIC | Age: 65
End: 2020-08-20
Payer: COMMERCIAL

## 2020-08-20 VITALS
SYSTOLIC BLOOD PRESSURE: 137 MMHG | HEART RATE: 71 BPM | HEIGHT: 67 IN | TEMPERATURE: 97.3 F | BODY MASS INDEX: 39.68 KG/M2 | DIASTOLIC BLOOD PRESSURE: 87 MMHG | WEIGHT: 252.8 LBS | OXYGEN SATURATION: 95 %

## 2020-08-20 PROCEDURE — 90471 IMMUNIZATION ADMIN: CPT | Performed by: FAMILY MEDICINE

## 2020-08-20 PROCEDURE — 99214 OFFICE O/P EST MOD 30 MIN: CPT | Performed by: FAMILY MEDICINE

## 2020-08-20 PROCEDURE — 90715 TDAP VACCINE 7 YRS/> IM: CPT | Performed by: FAMILY MEDICINE

## 2020-08-20 PROCEDURE — G0296 VISIT TO DETERM LDCT ELIG: HCPCS | Performed by: FAMILY MEDICINE

## 2020-08-20 RX ORDER — EMPAGLIFLOZIN 10 MG/1
1 TABLET, FILM COATED ORAL DAILY
Qty: 90 TABLET | Refills: 1 | Status: SHIPPED | OUTPATIENT
Start: 2020-08-20 | End: 2020-11-23 | Stop reason: SINTOL

## 2020-08-20 NOTE — PROGRESS NOTES
[address] PRIMARY CARE  [address] [place] [address] [address] [place] [address]  Dept: [phone]  Dept Fax: [phone]    Joy Lozano is a 63 y.o. female who presents today for her medical conditions/complaints as noted below. Joy Lozano is c/o of   Chief Complaint   Patient presents with    Diabetes     TYPE 2    Medication Check         HPI:     The patient presents for recheck. She had taken one pill of the farxiga after having side effects from taking the first pill. The patient has been decreasing the carbohydrates in her diet and is avoiding anything that has carbs. She is interested in trying farxiga or a similar medication again. She has considered GLP class in the past but does have a thyroid nodule that has been worked up and is benign but the class has been avoided due to concern for the potential for thyroid MTC. The patient has more energy since decreasing her carbs. She has not lost weight. Diabetes   She presents for her follow-up diabetic visit. She has type 2 diabetes mellitus. Her disease course has been worsening. There are no diabetic associated symptoms. There are no hypoglycemic complications. There are no diabetic complications. Pertinent negatives for diabetic complications include no CVA, PVD or retinopathy. Risk factors for coronary artery disease include diabetes mellitus, dyslipidemia, obesity, sedentary lifestyle, post-menopausal, tobacco exposure, hypertension and family history. Current diabetic treatment includes oral agent (dual therapy). She is compliant with treatment all of the time. Her weight is stable. She is following a diabetic diet. Meal planning includes avoidance of concentrated sweets and carbohydrate counting. She has not had a previous visit with a dietitian. She rarely participates in exercise. Her breakfast blood glucose is taken between 7-8 am. Her breakfast blood glucose range is generally >200 mg/dl.  Her bedtime blood glucose is taken between 8-9 pm. Her bedtime blood glucose range is generally >200 mg/dl. Her overall blood glucose range is >200 mg/dl. An ACE inhibitor/angiotensin II receptor blocker is not being taken. She does not see a podiatrist.Eye exam is current. Hypertension   This is a chronic problem. The current episode started more than 1 year ago. The problem has been gradually improving since onset. The problem is uncontrolled (diabetics should be 130/80 or less). Risk factors for coronary artery disease include diabetes mellitus, dyslipidemia, family history, obesity, post-menopausal state, sedentary lifestyle and smoking/tobacco exposure. Past treatments include calcium channel blockers and beta blockers. The current treatment provides mild improvement. Compliance problems include exercise and diet. There is no history of angina, kidney disease, CAD/MI, CVA, heart failure, left ventricular hypertrophy, PVD or retinopathy.        Hemoglobin A1C (%)   Date Value   07/06/2020 14.0   02/18/2020 12.3 (H)             ( goal A1Cis < 7)   No results found for: LABMICR  LDL Cholesterol (mg/dL)   Date Value   02/18/2020 140 (H)       (goal LDL is <100)   AST (U/L)   Date Value   02/18/2020 20     ALT (U/L)   Date Value   02/18/2020 24     BUN (mg/dL)   Date Value   02/18/2020 9     BP Readings from Last 3 Encounters:   08/20/20 137/87   07/12/20 132/78   07/06/20 126/69          (goal 120/80)    Past Medical History:   Diagnosis Date    Diabetes mellitus ([address]     Hypertension     Neuropathy     Sleep apnea       Past Surgical History:   Procedure Laterality Date    CHOLECYSTECTOMY      KNEE ARTHROSCOPY Left 1995    OTHER SURGICAL HISTORY      DNC for post menapausal bleeding    TONSILLECTOMY         Family History   Problem Relation Age of Onset    Breast Cancer Mother     Heart Defect Mother         Murmur     Alzheimer's Disease Mother     Diabetes type 2  Mother     Heart Disease Father     Other Father AA       Social History     Tobacco Use    Smoking status: Former Smoker     Packs/day: 2.00     Years: 44.00     Pack years: 88.00     Last attempt to quit: 2019     Years since quittin.2    Smokeless tobacco: Never Used   Substance Use Topics    Alcohol use: Not Currently      Current Outpatient Medications   Medication Sig Dispense Refill    empagliflozin (JARDIANCE) 10 MG tablet Take 1 tablet by mouth daily 90 tablet 1    Multiple Vitamin (MULTI-VITAMIN DAILY PO) Take by mouth daily      dapagliflozin (FARXIGA) 5 MG tablet Take 1 tablet by mouth every morning 30 tablet 0    pantoprazole (PROTONIX) 40 MG tablet Take 1 tablet by mouth daily 90 tablet 1    amLODIPine (NORVASC) 5 MG tablet Take 1 tablet by mouth daily 90 tablet 1    atenolol (TENORMIN) 50 MG tablet Take 1 tablet by mouth daily 90 tablet 1    metFORMIN (GLUCOPHAGE) 500 MG tablet Take 1 tablet by mouth 2 times daily (with meals) 180 tablet 1    pioglitazone (ACTOS) 30 MG tablet Take 1 tablet by mouth daily 90 tablet 1    aspirin 81 MG tablet Take 81 mg by mouth daily      Krill Oil 350 MG CAPS Take by mouth       No current facility-administered medications for this visit.       Allergies   Allergen Reactions    Augmentin [Amoxicillin-Pot Clavulanate] Nausea And Vomiting    Bactrim [Sulfamethoxazole-Trimethoprim] Nausea And Vomiting    Vancomycin Rash    Dolobid [Diflunisal] Nausea And Vomiting       Health Maintenance   Topic Date Due    Hepatitis C screen  1955    HIV screen  1972    Diabetic microalbuminuria test  1975    Colon cancer screen colonoscopy  2007    Low dose CT lung screening  2012    Breast cancer screen  2020    Shingles Vaccine (1 of 2) 2021 (Originally 2007)    Diabetic retinal exam  2021 (Originally 1965)    Flu vaccine (1) 2020    A1C test (Diabetic or Prediabetic)  10/06/2020    Lipid screen  2021    Potassium monitoring  02/18/2021    Creatinine monitoring  02/18/2021    Diabetic foot exam  07/06/2021    Cervical cancer screen  07/06/2025    DTaP/Tdap/Td vaccine (2 - Td) 08/20/2030    Pneumococcal 0-64 years Vaccine  Completed    Hepatitis A vaccine  Aged Out    Hib vaccine  Aged Out    Meningococcal (ACWY) vaccine  Aged Out       Subjective:     Review of Systems   Constitutional: Negative. HENT: Negative. Eyes: Negative. Respiratory: Negative. Cardiovascular: Negative. Gastrointestinal: Negative. Genitourinary: Negative. Musculoskeletal: Negative. Skin: Negative. Allergic/Immunologic: Negative for environmental allergies, food allergies and immunocompromised state. Neurological: Negative. Psychiatric/Behavioral: Negative. Objective:     Physical Exam  Vitals signs and nursing note reviewed. Constitutional:       General: She is awake. She is not in acute distress. Appearance: Normal appearance. She is obese. She is not toxic-appearing. HENT:      Head: Normocephalic and atraumatic. Right Ear: External ear normal.      Left Ear: External ear normal.      Nose: Nose normal.   Eyes:      Extraocular Movements: Extraocular movements intact. Conjunctiva/sclera: Conjunctivae normal.      Pupils: Pupils are equal, round, and reactive to light. Neck:      Musculoskeletal: Normal range of motion and neck supple. Cardiovascular:      Rate and Rhythm: Normal rate and regular rhythm. Pulses: Normal pulses. Heart sounds: Normal heart sounds. No murmur. Pulmonary:      Effort: Pulmonary effort is normal.      Breath sounds: Normal breath sounds. Abdominal:      General: Abdomen is flat. Bowel sounds are normal.      Palpations: Abdomen is soft. Musculoskeletal: Normal range of motion. Skin:     Capillary Refill: Capillary refill takes less than 2 seconds. Neurological:      General: No focal deficit present.       Mental Status: She is alert and oriented to person, place, and time. Mental status is at baseline. Cranial Nerves: No cranial nerve deficit. Motor: No weakness. Gait: Gait normal.   Psychiatric:         Attention and Perception: Attention normal.         Mood and Affect: Mood and affect normal.         Speech: Speech normal.         Behavior: Behavior normal.         Thought Content: Thought content normal.         Judgment: Judgment normal.       /87 (Site: Left Upper Arm, Position: Sitting, Cuff Size: Large Adult)   Pulse 71   Temp 97.3 °F (36.3 °C) (Skin)   Ht 5' 6.5\" (1.689 m)   Wt 252 lb 12.8 oz (114.7 kg)   SpO2 95%   BMI 40.19 kg/m²     Assessment:       Diagnosis Orders   1. Type 2 diabetes mellitus with hyperglycemia, without long-term current use of insulin (HCC)  Creatinine, Random Urine    empagliflozin (JARDIANCE) 10 MG tablet    Comprehensive Metabolic Panel, Fasting    Microalbumin, Ur   2. Essential hypertension  Comprehensive Metabolic Panel, Fasting   3. Elevated lipoprotein(a)     4. Severe obesity (BMI >= 40) (HCC)     5. Former tobacco use     6. Personal history of tobacco use  [place] VISIT TO DISCUSS LUNG CA SCREEN W LDCT    CT Lung Screen (Annual)   7. Encounter for screening mammogram for breast cancer  HARI Digital Screen Bilateral [[id number]]   8. Need for prophylactic vaccination and inoculation against varicella  zoster recombinant adjuvanted vaccine (SHINGRIX) 50 MCG/0.5ML SUSR injection   9. Lipid screening  Lipid, Fasting   10. Need for hepatitis C screening test  Hepatitis C Antibody   11. Encounter for screening for HIV  HIV Screen   12. Need for tetanus, diphtheria, and acellular pertussis (Tdap) vaccine  Tdap (age 6y and older) IM ([address] [place] [address] Extension)   15. Colon cancer screening  [facility] Screening Colonoscopy   14. Encounter for screening for lung cancer               Plan:    DM-2 - very poorly controlled. D/w patient treatment options. If a1c remains >9 we will need to add insulin.   Patient would like to trial SGLT class before that. Advised patient if she does not lose weight she would likely continue to have significant problems with controlling her blood sugars. Patient needs a statin and ace inhibitor but she is hesitant to start any new medications. Will need to continue to educate and educate each visit    HTN - patient's bp slightly elevated by guidelines for dm patients. Patient would like to change her lifestyle to help with control. Will add next visit. LDL elevated - will recheck and add statin    Obesity - d/w patient the importance of losing weight. Advised her if she doesn't she will end up on insulin, potentially have an MI or stroke and have multiple other complications. Patient states she is motivated and has a plan. Will follow up closely. Return in about 3 months (around 2020) for diabetes. Orders Placed This Encounter   Procedures    HARI Digital Screen Bilateral [id number]     Standing Status:   Future     Standing Expiration Date:   2021     Order Specific Question:   Reason for exam:     Answer:   z12.31    CT Lung Screen (Annual)     Age: Patient is 63 y.o. Smoking History: Social History    Tobacco Use      Smoking status: Former Smoker        Packs/day: 2.00        Years: 44.00        Pack years: 80        Quit date: 2019        Years since quittin.2      Smokeless tobacco: Never Used    Alcohol use: Not Currently    Drug use: Never   Pack years: 80    Date of last lung cancer screening: [unfilled]     Standing Status:   Future     Standing Expiration Date:   2022     Order Specific Question:   Is there documentation of shared decision making? Answer:   Yes     Order Specific Question:   Is this a low dose CT or a routine CT? Answer:   Low Dose CT [1]     Order Specific Question:   Is this the first (baseline) CT or an annual exam?     Answer:    Annual [2]     Order Specific Question:   Does the patient show any signs or symptoms of Tito Diego MD on 8/21/2020 at 6:33 AM  Low Dose CT (LDCT) Lung Screening criteria met   Age 50-69   Pack year smoking >30   Still smoking or less than 15 year since quit   No sign or symptoms of lung cancer   > 11 months since last LDCT     Risks and benefits of lung cancer screening with LDCT scans discussed:    Significance of positive screen - False-positive LDCT results often occur. 95% of all positive results do not lead to a diagnosis of cancer. Usually further imaging can resolve most false-positive results; however, some patients may require invasive procedures. Over diagnosis risk - 10% to 12% of screen-detected lung cancer cases are over diagnosed--that is, the cancer would not have been detected in the patient's lifetime without the screening. Need for follow up screens annually to continue lung cancer screening effectiveness     Risks associated with radiation from annual LDCT- Radiation exposure is about the same as for a mammogram, which is about 1/3 of the annual background radiation exposure from everyday life. Starting screening at age 54 is not likely to increase cancer risk from radiation exposure. Patients with comorbidities resulting in life expectancy of < 10 years, or that would preclude treatment of an abnormality identified on CT, should not be screened due to lack of benefit.     To obtain maximal benefit from this screening, smoking cessation and long-term abstinence from smoking is critical

## 2020-08-21 ENCOUNTER — TELEPHONE (OUTPATIENT)
Dept: GASTROENTEROLOGY | Age: 65
End: 2020-08-21

## 2020-08-21 PROBLEM — E66.01 SEVERE OBESITY (BMI >= 40) (HCC): Chronic | Status: ACTIVE | Noted: 2020-08-21

## 2020-08-21 PROBLEM — E78.41 ELEVATED LIPOPROTEIN(A): Chronic | Status: ACTIVE | Noted: 2020-08-21

## 2020-08-21 RX ORDER — SODIUM, POTASSIUM,MAG SULFATES 17.5-3.13G
SOLUTION, RECONSTITUTED, ORAL ORAL
Qty: 1 BOTTLE | Refills: 0 | Status: SHIPPED | OUTPATIENT
Start: 2020-08-21 | End: 2020-11-30

## 2020-08-21 ASSESSMENT — ENCOUNTER SYMPTOMS
GASTROINTESTINAL NEGATIVE: 1
RESPIRATORY NEGATIVE: 1
EYES NEGATIVE: 1

## 2020-08-21 NOTE — TELEPHONE ENCOUNTER
Writer spoke to pt over the phone in regards to scheduling GI referral.  Keysha Joseph went over colon screen questionnaire w/ pt over the phone and pt is sched w/ Asad Rodriguez at [facility] 10/26/20 @ 10am proc time, 8:30am arrival time. (Late October date is requested by pt). Suprep order will be sent to [facility] on [address] [place] [address]. Bowel prep instructions given to pt over the phone and hard copy mailed to pt's home address. Pt instructed she will need a . Covid testing is sched at [facility] 10/22/20 @ 9am. Pt given address and instructed where to report. Pt voices her understanding.

## 2020-08-21 NOTE — TELEPHONE ENCOUNTER
Writer was contacted by [employer] from Fern HORTA at [facility] wanting to know if we sched colonoscopy proc w/o an office consultation first.  Daphne Ford explained yes we do, but only if pt meets certain criteria. Fern Francois asked if we could contact pt and get her referral sched for screening colonoscopy. Writer advised Fern Francois we would call pt.

## 2020-09-28 ENCOUNTER — HOSPITAL ENCOUNTER (OUTPATIENT)
Dept: MAMMOGRAPHY | Age: 65
Discharge: HOME | End: 2020-09-30
Payer: COMMERCIAL

## 2020-09-28 PROCEDURE — 77080 DXA BONE DENSITY AXIAL: CPT

## 2020-09-28 PROCEDURE — 77063 BREAST TOMOSYNTHESIS BI: CPT

## 2020-10-21 ENCOUNTER — TELEPHONE (OUTPATIENT)
Dept: ONCOLOGY | Age: 65
End: 2020-10-21

## 2020-10-21 NOTE — LETTER
10/21/2020        [address] [place] [id number]    Dear Thierno Quiros: Your healthcare provider has ordered a low dose CT scan of the chest for lung cancer screening. You will find enclosed, information about CT lung screening. Please review the statement of understanding, you will be asked to sign a copy of this at the time of your CT scan    If you have not already been contacted to make the appointment for your scan, please call our scheduling department at [phone]    Keep in mind that CT lung screening does not take the place of smoking cessation. If you are a current smoker, you will find enclosed smoking cessation resources. Please do not hesitate to contact me if you have any questions or concerns.     [address] [place] [address] Lung Screening Program  [phone]

## 2020-10-22 ENCOUNTER — HOSPITAL ENCOUNTER (OUTPATIENT)
Dept: PREADMISSION TESTING | Age: 65
Setting detail: SPECIMEN
Discharge: HOME | End: 2020-10-26
Payer: COMMERCIAL

## 2020-10-22 PROCEDURE — U0003 INFECTIOUS AGENT DETECTION BY NUCLEIC ACID (DNA OR RNA); SEVERE ACUTE RESPIRATORY SYNDROME CORONAVIRUS 2 (SARS-COV-2) (CORONAVIRUS DISEASE [COVID-19]), AMPLIFIED PROBE TECHNIQUE, MAKING USE OF HIGH THROUGHPUT TECHNOLOGIES AS DESCRIBED BY CMS-2020-01-R: HCPCS

## 2020-10-23 ENCOUNTER — ANESTHESIA EVENT (OUTPATIENT)
Dept: OPERATING ROOM | Age: 65
End: 2020-10-23
Payer: COMMERCIAL

## 2020-10-23 LAB — SARS-COV-2, NAA: NOT DETECTED

## 2020-10-26 ENCOUNTER — ANESTHESIA (OUTPATIENT)
Dept: OPERATING ROOM | Age: 65
End: 2020-10-26
Payer: COMMERCIAL

## 2020-10-26 ENCOUNTER — HOSPITAL ENCOUNTER (OUTPATIENT)
Age: 65
Setting detail: OUTPATIENT SURGERY
Discharge: HOME | End: 2020-10-26
Attending: INTERNAL MEDICINE | Admitting: INTERNAL MEDICINE
Payer: COMMERCIAL

## 2020-10-26 VITALS
HEART RATE: 85 BPM | TEMPERATURE: 97.3 F | SYSTOLIC BLOOD PRESSURE: 121 MMHG | WEIGHT: 248 LBS | HEIGHT: 66 IN | BODY MASS INDEX: 39.86 KG/M2 | OXYGEN SATURATION: 97 % | RESPIRATION RATE: 20 BRPM | DIASTOLIC BLOOD PRESSURE: 85 MMHG

## 2020-10-26 VITALS
RESPIRATION RATE: 18 BRPM | OXYGEN SATURATION: 99 % | DIASTOLIC BLOOD PRESSURE: 78 MMHG | SYSTOLIC BLOOD PRESSURE: 125 MMHG

## 2020-10-26 LAB
GLUCOSE BLD-MCNC: 346 MG/DL (ref 65–105)
GLUCOSE BLD-MCNC: 374 MG/DL (ref 65–105)

## 2020-10-26 PROCEDURE — 82947 ASSAY GLUCOSE BLOOD QUANT: CPT

## 2020-10-26 PROCEDURE — 2500000003 HC RX 250 WO HCPCS: Performed by: NURSE ANESTHETIST, CERTIFIED REGISTERED

## 2020-10-26 PROCEDURE — 7100000010 HC PHASE II RECOVERY - FIRST 15 MIN: Performed by: INTERNAL MEDICINE

## 2020-10-26 PROCEDURE — 88305 TISSUE EXAM BY PATHOLOGIST: CPT

## 2020-10-26 PROCEDURE — 6360000002 HC RX W HCPCS: Performed by: NURSE ANESTHETIST, CERTIFIED REGISTERED

## 2020-10-26 PROCEDURE — 3609010600 HC COLONOSCOPY POLYPECTOMY SNARE/COLD BIOPSY: Performed by: INTERNAL MEDICINE

## 2020-10-26 PROCEDURE — 45380 COLONOSCOPY AND BIOPSY: CPT | Performed by: INTERNAL MEDICINE

## 2020-10-26 PROCEDURE — 2580000003 HC RX 258: Performed by: ANESTHESIOLOGY

## 2020-10-26 PROCEDURE — 3700000000 HC ANESTHESIA ATTENDED CARE: Performed by: INTERNAL MEDICINE

## 2020-10-26 PROCEDURE — 7100000011 HC PHASE II RECOVERY - ADDTL 15 MIN: Performed by: INTERNAL MEDICINE

## 2020-10-26 PROCEDURE — 3700000001 HC ADD 15 MINUTES (ANESTHESIA): Performed by: INTERNAL MEDICINE

## 2020-10-26 PROCEDURE — 2709999900 HC NON-CHARGEABLE SUPPLY: Performed by: INTERNAL MEDICINE

## 2020-10-26 RX ORDER — SODIUM CHLORIDE 0.9 % (FLUSH) 0.9 %
10 SYRINGE (ML) INJECTION EVERY 12 HOURS SCHEDULED
Status: DISCONTINUED | OUTPATIENT
Start: 2020-10-26 | End: 2020-10-26 | Stop reason: HOSPADM

## 2020-10-26 RX ORDER — SODIUM CHLORIDE 9 MG/ML
INJECTION, SOLUTION INTRAVENOUS CONTINUOUS
Status: DISCONTINUED | OUTPATIENT
Start: 2020-10-27 | End: 2020-10-26

## 2020-10-26 RX ORDER — SODIUM CHLORIDE 0.9 % (FLUSH) 0.9 %
10 SYRINGE (ML) INJECTION PRN
Status: DISCONTINUED | OUTPATIENT
Start: 2020-10-26 | End: 2020-10-26 | Stop reason: HOSPADM

## 2020-10-26 RX ORDER — LIDOCAINE HYDROCHLORIDE 10 MG/ML
1 INJECTION, SOLUTION EPIDURAL; INFILTRATION; INTRACAUDAL; PERINEURAL
Status: DISCONTINUED | OUTPATIENT
Start: 2020-10-27 | End: 2020-10-26 | Stop reason: HOSPADM

## 2020-10-26 RX ORDER — LIDOCAINE HYDROCHLORIDE 20 MG/ML
INJECTION, SOLUTION EPIDURAL; INFILTRATION; INTRACAUDAL; PERINEURAL PRN
Status: DISCONTINUED | OUTPATIENT
Start: 2020-10-26 | End: 2020-10-26 | Stop reason: SDUPTHER

## 2020-10-26 RX ORDER — SODIUM CHLORIDE, SODIUM LACTATE, POTASSIUM CHLORIDE, CALCIUM CHLORIDE 600; 310; 30; 20 MG/100ML; MG/100ML; MG/100ML; MG/100ML
INJECTION, SOLUTION INTRAVENOUS CONTINUOUS
Status: DISCONTINUED | OUTPATIENT
Start: 2020-10-27 | End: 2020-10-26 | Stop reason: HOSPADM

## 2020-10-26 RX ORDER — PROPOFOL 10 MG/ML
INJECTION, EMULSION INTRAVENOUS PRN
Status: DISCONTINUED | OUTPATIENT
Start: 2020-10-26 | End: 2020-10-26 | Stop reason: SDUPTHER

## 2020-10-26 RX ORDER — ONDANSETRON 2 MG/ML
4 INJECTION INTRAMUSCULAR; INTRAVENOUS
Status: DISCONTINUED | OUTPATIENT
Start: 2020-10-26 | End: 2020-10-26 | Stop reason: HOSPADM

## 2020-10-26 RX ADMIN — LIDOCAINE HYDROCHLORIDE 100 MG: 20 INJECTION, SOLUTION EPIDURAL; INFILTRATION; INTRACAUDAL; PERINEURAL at 09:20

## 2020-10-26 RX ADMIN — PROPOFOL 150 MG: 10 INJECTION, EMULSION INTRAVENOUS at 09:20

## 2020-10-26 RX ADMIN — SODIUM CHLORIDE, POTASSIUM CHLORIDE, SODIUM LACTATE AND CALCIUM CHLORIDE: 600; 310; 30; 20 INJECTION, SOLUTION INTRAVENOUS at 09:12

## 2020-10-26 RX ADMIN — PROPOFOL 150 MG: 10 INJECTION, EMULSION INTRAVENOUS at 09:31

## 2020-10-26 ASSESSMENT — PULMONARY FUNCTION TESTS
PIF_VALUE: 1

## 2020-10-26 ASSESSMENT — PAIN SCALES - GENERAL
PAINLEVEL_OUTOF10: 0

## 2020-10-26 ASSESSMENT — PAIN - FUNCTIONAL ASSESSMENT: PAIN_FUNCTIONAL_ASSESSMENT: 0-10

## 2020-10-26 NOTE — OP NOTE
PROCEDURE NOTE    DATE OF PROCEDURE: 10/26/2020    SURGEON: Radha Roth MD    ASSISTANT: None    PREOPERATIVE DIAGNOSIS: SCREENING  HX OF COLON POLYPS    POSTOPERATIVE DIAGNOSIS: as described below    OPERATION: Total colonoscopy     ANESTHESIA: MAC PER ANESTHESIA     ESTIMATED BLOOD LOSS: less than 50     COMPLICATIONS: None. SPECIMENS:  Was Obtained:     HISTORY: The patient is a 63y.o. year old female with history of above preop diagnosis. I recommended colonoscopy with possible biopsy or polypectomy and I explained the risk, benefits, expected outcome, and alternatives to the procedure. Risks included but are not limited to bleeding, infection, respiratory distress, hypotension, and perforation of the colon and possibility of missing a lesion. The patient understands and is in agreement. PROCEDURE: The patient was given IV conscious sedation. The patient's SPO2 remained above 90% throughout the procedure. The colonoscope was inserted per rectum and advanced under direct vision to the cecum without difficulty. The prep was good. Findings:  Terminal ileum: normal    Cecum/Ascending colon: normal    Transverse colon: normal    Descending/Sigmoid colon: abnormal: AT SPLENIC FLEXURE A 3 MM POLYP WAS BIOPSIED WITH JUMBO BIOPSY FORCEPS  A DIMINUTIVE POLYP WAS BIOPSIED FROM 30 CM  Rectum/Anus: examined in normal and retroflexed positions and was abnormal: MOD INT HEMORRHOIDS      Withdrawal Time was (minutes): 13    The colon was decompressed and the scope was removed. The patient tolerated the procedure well. Recommendations/Plan:   1. Lifestyle and dietary modifications as discussed  2. F/U Biopsies  3. F/U In Office in 3-4 weeks  4. Discussed with the family  5. Colonoscopy Recall :3 year  6. POST SEDATION PATIENT WAS STABLE WITH STABLE VITAL SIGNS AND OXYGEN SATURATIONS AND WAS DISCHARGED HOME WITH RIDE IN A STABLE CONDITION.     Electronically signed by Radha Roth MD  on 10/26/2020 at 9:42 AM

## 2020-10-26 NOTE — ANESTHESIA POSTPROCEDURE EVALUATION
Department of Anesthesiology  Postprocedure Note    Patient: Drake Em  MRN: [id number]  YOB: 1955  Date of evaluation: 10/26/2020  Time:  1:09 PM     Procedure Summary     Date:  10/26/20 Room / Location:  [place] [address] / [facility] - INPATIENT    Anesthesia Start:  9039 Anesthesia Stop:  4058    Procedure:  COLONOSCOPY POLYPECTOMY  BIOPSY (N/A ) Diagnosis:  (DX SCREENING, HX COLON POLYPS)    Surgeon:  Pooja Mcnamara MD Responsible Provider:  Michelle Starks MD    Anesthesia Type:  MAC ASA Status:  3          Anesthesia Type: MAC    Ronak Phase I:      Ronak Phase II: Ronak Score: 10    Last vitals: Reviewed and per EMR flowsheets.        Anesthesia Post Evaluation    Patient location during evaluation: PACU  Level of consciousness: awake and alert  Complications: no

## 2020-10-26 NOTE — ANESTHESIA PRE PROCEDURE
Department of Anesthesiology  Preprocedure Note       Name:  Stuart Marti   Age:  63 y.o.  :  1955                                          MRN:  [id number]         Date:  10/26/2020      Surgeon: Eleanor Solomon):  Damien Chu MD    Procedure: Procedure(s):  COLONOSCOPY DIAGNOSTIC    Medications prior to admission:   Prior to Admission medications    Medication Sig Start Date End Date Taking? Authorizing Provider   Na Sulfate-K Sulfate-Mg Sulf 17.5-3.13-1.6 GM/177ML SOLN Use as directed in your patient instructions. 20   Damien Chu MD   empagliflozin (JARDIANCE) 10 MG tablet Take 1 tablet by mouth daily 20   Lynn Petty MD   Multiple Vitamin (MULTI-VITAMIN DAILY PO) Take by mouth daily    Historical Provider, MD   dapagliflozin (FARXIGA) 5 MG tablet Take 1 tablet by mouth every morning 20   Lynn Petty MD   pantoprazole (PROTONIX) 40 MG tablet Take 1 tablet by mouth daily 20   Lynn Petty MD   amLODIPine (NORVASC) 5 MG tablet Take 1 tablet by mouth daily 20   Lynn Petty MD   atenolol (TENORMIN) 50 MG tablet Take 1 tablet by mouth daily 20   Lynn Petty MD   metFORMIN (GLUCOPHAGE) 500 MG tablet Take 1 tablet by mouth 2 times daily (with meals) 20   Lynn Petty MD   pioglitazone (ACTOS) 30 MG tablet Take 1 tablet by mouth daily 20   Lynn Petty MD   aspirin 81 MG tablet Take 81 mg by mouth daily    Historical Provider, MD Cosme Durand 350 MG CAPS Take by mouth    Historical Provider, MD       Current medications:    No current facility-administered medications for this encounter. Allergies:     Allergies   Allergen Reactions    Augmentin [Amoxicillin-Pot Clavulanate] Nausea And Vomiting    Bactrim [Sulfamethoxazole-Trimethoprim] Nausea And Vomiting    Vancomycin Rash    Dolobid [Diflunisal] Nausea And Vomiting       Problem List:    Patient Active Problem List   Diagnosis Code    Essential hypertension I10    Muscle cramps R25.2    Type 2 diabetes mellitus without complication (HCC) X36.8    Elevated lipoprotein(a) E78.41    Severe obesity (BMI >= 40) ([facility]) E66.01       Past Medical History:        Diagnosis Date    Diabetes mellitus [facility] [address]     Hypertension     Neuropathy     Sleep apnea        Past Surgical History:        Procedure Laterality Date    CHOLECYSTECTOMY      KNEE ARTHROSCOPY Left     OTHER SURGICAL HISTORY      DNC for post menapausal bleeding    TONSILLECTOMY         Social History:    Social History     Tobacco Use    Smoking status: Former Smoker     Packs/day: 2.00     Years: 44.00     Pack years: 88.00     Last attempt to quit: 2019     Years since quittin.4    Smokeless tobacco: Never Used   Substance Use Topics    Alcohol use: Not Currently                                Counseling given: Not Answered      Vital Signs (Current): There were no vitals filed for this visit.                                            BP Readings from Last 3 Encounters:   20 137/87   20 132/78   20 126/69       NPO Status:                                                                                 BMI:   Wt Readings from Last 3 Encounters:   20 252 lb 12.8 oz (114.7 kg)   20 253 lb (114.8 kg)   20 257 lb (116.6 kg)     There is no height or weight on file to calculate BMI.    CBC:   Lab Results   Component Value Date    WBC 6.8 2020    RBC 4.85 2020    HGB 15.2 2020    HCT 45.9 2020    MCV 94.6 2020    RDW 12.7 2020    PLT See Reflexed IPF Result 2020       CMP:   Lab Results   Component Value Date     2020    K 4.3 2020    CL 97 2020    CO2 21 2020    BUN 9 2020    CREATININE 0.57 2020    GFRAA >60 2020    LABGLOM >60 2020    PROT 7.3 2020    CALCIUM 9.2 2020    BILITOT 0.62 2020    ALKPHOS 80 2020    AST 20 2020    ALT 24 2020       POC Tests: No results for input(s): POCGLU, POCNA, POCK, POCCL, POCBUN, POCHEMO, POCHCT in the last 72 hours. Coags: No results found for: PROTIME, INR, APTT    HCG (If Applicable): No results found for: PREGTESTUR, PREGSERUM, HCG, HCGQUANT     ABGs: No results found for: PHART, PO2ART, CVF2LZC, FGK2CTV, BEART, G4ARTSVJ     Type & Screen (If Applicable):  No results found for: LABABO, LABRH    Drug/Infectious Status (If Applicable):  No results found for: HIV, HEPCAB    COVID-19 Screening (If Applicable):   Lab Results   Component Value Date    COVID19 Not Detected 10/22/2020         Anesthesia Evaluation   no history of anesthetic complications:   Airway: Mallampati: I  TM distance: >3 FB   Neck ROM: full  Mouth opening: > = 3 FB Dental:          Pulmonary:   (+) sleep apnea: on CPAP,                             Cardiovascular:  Exercise tolerance: good (>4 METS),   (+) hypertension:,     (-)  angina and  ZHAO                Neuro/Psych:               GI/Hepatic/Renal:   (+) bowel prep,           Endo/Other:    (+) DiabetesType II DM, , .                 Abdominal:           Vascular:                                      Anesthesia Plan      MAC     ASA 3       Induction: intravenous. Anesthetic plan and risks discussed with patient.                       Altagracia Vang MD   10/26/2020

## 2020-10-26 NOTE — H&P
History and Physical Service   [facility]    HISTORY AND PHYSICAL EXAMINATION            Date of Evaluation: 10/26/2020  Patient name:  Drake Em  MRN:   [id number]  YOB: 1955  PCP:    Krystina Lazo MD    History Obtained From:     Patient, medical records    History of Present Illness: This is Drake Em a 63 y.o. female who presents today for a diagnostic colonoscopy by Dr. Naheed Lopes for colon screening, Hx polyps. Patient followed the bowel prep as directed until watery yellow clear  Previous colonoscopy with polyps removed 3 years ago in [place]. No FH colon cancer  Denies bowel changes, bloody tarry stools, abdominal pain, diarrhea alternating with constipation or unintentional weight loss. Hx DM controlled with medication. She denies fever, chills, cough, wheezing, increased SOB or chest pain. +DM POC   Anesthesiologist, Dr Kylah Castanon notified and evaluated   Last ASA 81mg 10/11/20     Past Medical History:     Past Medical History:   Diagnosis Date    Diabetes mellitus ([address]     Hx of blood clots     left leg    Hypertension     Kidney calculi     passed on her own    Neuropathy     DIONI on CPAP     uses nightly    Prolonged emergence from general anesthesia     Sleep apnea         Past Surgical History:     Past Surgical History:   Procedure Laterality Date    CHOLECYSTECTOMY      COLONOSCOPY      removed polyps    KNEE ARTHROSCOPY Left 1995    OTHER SURGICAL HISTORY      DNC for post menapausal bleeding    TONSILLECTOMY          Medications Prior to Admission:     Prior to Admission medications    Medication Sig Start Date End Date Taking? Authorizing Provider   Na Sulfate-K Sulfate-Mg Sulf 17.5-3.13-1.6 GM/177ML SOLN Use as directed in your patient instructions.  8/21/20  Yes Pooja Mcnamara MD   empagliflozin (JARDIANCE) 10 MG tablet Take 1 tablet by mouth daily 8/20/20  Yes Krystina Lazo MD   dapagliflozin (FARXIGA) 5 MG tablet Take 1 tablet by mouth every morning 7/6/20  Yes Aaln Garcia MD   pantoprazole (PROTONIX) 40 MG tablet Take 1 tablet by mouth daily 6/16/20  Yes Alan Garcai MD   amLODIPine (NORVASC) 5 MG tablet Take 1 tablet by mouth daily 6/16/20  Yes Alan Garcia MD   atenolol (TENORMIN) 50 MG tablet Take 1 tablet by mouth daily 6/16/20  Yes Alan Garcia MD   metFORMIN (GLUCOPHAGE) 500 MG tablet Take 1 tablet by mouth 2 times daily (with meals) 6/16/20  Yes Alan Garcia MD   pioglitazone (ACTOS) 30 MG tablet Take 1 tablet by mouth daily 6/16/20  Yes Alan Garcia MD   Becca Rodríguez Oil 350 MG CAPS Take by mouth   Yes Historical Provider, MD   Multiple Vitamin (MULTI-VITAMIN DAILY PO) Take by mouth daily    Historical Provider, MD   aspirin 81 MG tablet Take 81 mg by mouth daily    Historical Provider, MD        Allergies:     Augmentin [amoxicillin-pot clavulanate]; Bactrim [sulfamethoxazole-trimethoprim]; Vancomycin; and Dolobid [diflunisal]    Social History:     Tobacco:    reports that she quit smoking about 16 months ago. She has a 88.00 pack-year smoking history. She has never used smokeless tobacco.  Alcohol:      reports previous alcohol use. Drug Use:  reports no history of drug use. Family History:     Family History   Problem Relation Age of Onset    Breast Cancer Mother     Heart Defect Mother         Murmur     Alzheimer's Disease Mother     Diabetes type 2  Mother     Heart Disease Father     Other Father         AA       Review of Systems:     Positive and Negative as described in HPI. CONSTITUTIONAL:  negative for fevers, chills, sweats, fatigue, weight loss  HEENT:  negative for vision, hearing changes, runny nose, throat pain  RESPIRATORY: +DIONI uses CPAP nightly negative for shortness of breath, cough, congestion, wheezing. CARDIOVASCULAR:  negative for chest pain, palpitations.   GASTROINTESTINAL: See HPI  negative for nausea, vomiting, diarrhea, constipation, change in bowel habits, abdominal pain   GENITOURINARY: negative for difficulty of urination, burning with urination, frequency   INTEGUMENT:  negative for rash, skin lesions, easy bruising   HEMATOLOGIC/LYMPHATIC: varicose veins bilateral lower legs and ankles  negative for swelling/edema   ALLERGIC/IMMUNOLOGIC:  negative for urticaria , itching  ENDOCRINE: +DM past 13+ years Taking 4 different medications Last A1C 14.0 7/6/20  negative increase in drinking, increase in urination, hot or cold intolerance  MUSCULOSKELETAL:  negative joint pains, muscle aches, swelling of joints  NEUROLOGICAL:  negative for headaches, dizziness, lightheadedness, numbness, pain, tingling extremities  BEHAVIOR/PSYCH:  negative for depression, anxiety    Physical Exam:   BP (!) 150/81   Pulse 107   Temp 96.6 °F (35.9 °C) (Temporal)   Resp 20   Ht 5' 6\" (1.676 m)   Wt 248 lb (112.5 kg)   SpO2 96%   BMI 40.03 kg/m²   No LMP recorded. Patient is postmenopausal.  No obstetric history on file. No results for input(s): POCGLU in the last 72 hours. General Appearance: morbidly obese alert, well appearing, and in no acute distress  Mental status: oriented to person, place, and time with normal affect  Head:  normocephalic, atraumatic. Eye: no icterus, redness, pupils equal and reactive, extraocular eye movements intact, conjunctiva clear  Ear: normal external ear, no discharge, hearing intact  Nose:  no drainage noted  Mouth: mucous membranes moist  Neck: supple, no carotid bruits, thyroid not palpable  Lungs: Bilateral equal air entry, clear to ausculation, no wheezing, rales or rhonchi, normal effort  Cardiovascular:  tachycardic rate and regular rhythm, no murmur, gallop, rub.   Abdomen: Soft, round, obese, nontender, nondistended, normal bowel sounds, no hepatomegaly or splenomegaly  Neurologic: There are no new focal motor or sensory deficits, normal muscle tone and bulk, no abnormal sensation, normal speech, cranial nerves II through XII grossly intact  Skin: superficial varicosities bilateral lower leg/ankle  No gross lesions, rashes, bruising or bleeding on exposed skin area  Extremities:  peripheral pulses palpable, no pedal edema or calf pain with palpation  Psych: normal affect     Investigations:      Laboratory Testing:  No results found for this or any previous visit (from the past 24 hour(s)). No results for input(s): HGB, HCT, WBC, MCV, PLATELET, NA, K, CL, CO2, BUN, CREATININE, GLUCOSE, INR, PROTIME, APTT, AST, ALT, LABALBU, HCG in the last 720 hours. Recent Labs     10/22/20  0900   COVID19 Not Detected     Imaging/Diagnostics:    Dexa Bone Density Axial Skeleton    Result Date: 9/28/2020  EXAMINATION: BONE DENSITOMETRY 9/28/2020 10:39 am TECHNIQUE: A bone density dual x-ray absorptiometry (DEXA) scan was performed of the lumbar spine and left hip  on a [employer] system. COMPARISON: None. HISTORY: ORDERING SYSTEM PROVIDED HISTORY: Osteopenia, unspecified location TECHNOLOGIST PROVIDED HISTORY: osteopenia FINDINGS: LUMBAR SPINE: The bone mineral density in the lumbar spine including the L2-L4 levels is measured at 1.139 g/cm2, which corresponds to a T-score of -0.5 and a Z-score of -0.2. This is within the normal range by WHO criteria. LEFT HIP: The bone mineral density in the total hip is measured at 0.985 g/cm2 corresponding to a T-score of -0.2 and a Z-score of +0.1. This is within the normal range by WHO criteria. The bone mineral density of the femoral neck is measured at 0.822 g/cm2 corresponding to a T-score of -1.6 and a Z-score of -0.9. This is within the osteopenia range by WHO criteria. FRAX 10 year fracture risk is calculated to be 7.8% at any site and 0.7% at the hip. Osteopenia by WHO criteria.      Sarah Santosh Digital Screen Bilateral    Result Date: 10/15/2020  EXAMINATION: SCREENING DIGITAL BILATERAL  MAMMOGRAM WITH TOMOSYNTHESIS, 9/28/2020 TECHNIQUE: Screening mammography was performed with tomosynthesis including MLO and CC views of the bilateral breasts. Computer aided detection was used for the interpretation of this exam. COMPARISON: Baseline examination HISTORY: Screening. FINDINGS: There are scattered bilateral fibroglandular densities. There is a small cluster of indeterminate calcifications seen in the middle depth of the left inferior retroareolar breast.  There is a suspicion of a 3 mm vaguely spiculated mammographic asymmetry seen in the middle depth of the left superolateral breast. No right breast findings     Incompletely characterized cluster of indeterminate left breast calcifications. Further imaging assessment is requested with cone magnification views of the calcifications in 2 projections Left mammographic asymmetry, further imaging assessment requested with spot 2D/3D cone imaging in 2 projections with left whole breast tomosynthesis in the mL projection and targeted ultrasound as indicated at diagnostic assessment BIRADS: BIRADS - CATEGORY 0 Incomplete: Needs Additional Imaging Evaluation OVERALL ASSESSMENT - INCOMPLETE:NEED ADDITIONAL IMAGING EVALUATION. Based on the [facility]) model,  this patient's lifetime risk for developing breast cancer is 20.9%. The American Cancer society considers women with a 20 percent or greater lifetime risk for developing breast cancer as \"high risk\" . Women at risk for breast cancer may benefit from additional screening, which may include an annual screening mammogram alternating every six (6) months with a breast MRI, as appropriate. Recommend that this patient consult with her preferred provider about: A Genetic Counseling Consultation to discuss formal risk assessment, and a Medical Oncology Consultation to discuss risk reduction strategies (if not already performed). Assistance, if requested, is available through the [facility] Breast Program at [phone], or by placing an [facility] Ambulatory referral to the [facility] \". Diagnosis:      1.  Colon screening,

## 2020-10-27 LAB — SURGICAL PATHOLOGY REPORT: NORMAL

## 2020-10-28 ENCOUNTER — HOSPITAL ENCOUNTER (OUTPATIENT)
Dept: MAMMOGRAPHY | Age: 65
Discharge: HOME | End: 2020-10-30
Payer: COMMERCIAL

## 2020-10-28 ENCOUNTER — HOSPITAL ENCOUNTER (OUTPATIENT)
Dept: CT IMAGING | Age: 65
Discharge: HOME | End: 2020-10-30
Payer: COMMERCIAL

## 2020-10-28 ENCOUNTER — HOSPITAL ENCOUNTER (OUTPATIENT)
Dept: ULTRASOUND IMAGING | Age: 65
Discharge: HOME | End: 2020-10-30
Payer: COMMERCIAL

## 2020-10-28 PROCEDURE — 76642 ULTRASOUND BREAST LIMITED: CPT

## 2020-10-28 PROCEDURE — G0297 LDCT FOR LUNG CA SCREEN: HCPCS

## 2020-10-28 PROCEDURE — G0279 TOMOSYNTHESIS, MAMMO: HCPCS

## 2020-11-02 RX ORDER — PANTOPRAZOLE SODIUM 40 MG/1
TABLET, DELAYED RELEASE ORAL
Qty: 90 TABLET | Refills: 1 | Status: SHIPPED | OUTPATIENT
Start: 2020-11-02 | End: 2021-06-22 | Stop reason: SDUPTHER

## 2020-11-02 RX ORDER — ATENOLOL 50 MG/1
TABLET ORAL
Qty: 90 TABLET | Refills: 1 | Status: SHIPPED | OUTPATIENT
Start: 2020-11-02 | End: 2021-06-17

## 2020-11-02 NOTE — TELEPHONE ENCOUNTER
Electronic medication refill request. Pharmacy on file. Please advise.         Next Visit Date:  Future Appointments   Date Time Provider Don Tolbert   11/10/2020 10:00 AM STA MAMMO RM 1 STAZ MAMMO STA Radiolog   11/23/2020 10:30 AM MD [facility] [address] Maintenance   Topic Date Due    Hepatitis C screen  1955    HIV screen  08/17/1972    Diabetic microalbuminuria test  08/17/1975    Flu vaccine (1) 09/01/2020    A1C test (Diabetic or Prediabetic)  10/06/2020    Shingles Vaccine (1 of 2) 08/20/2021 (Originally 8/17/2007)    Lipid screen  02/18/2021    Potassium monitoring  02/18/2021    Creatinine monitoring  02/18/2021    Diabetic foot exam  07/06/2021    Diabetic retinal exam  08/21/2021    Breast cancer screen  10/28/2021    Low dose CT lung screening  10/28/2021    Cervical cancer screen  07/06/2025    DTaP/Tdap/Td vaccine (2 - Td) 08/20/2030    Colon cancer screen colonoscopy  10/26/2030    Pneumococcal 0-64 years Vaccine  Completed    Hepatitis A vaccine  Aged Out    Hib vaccine  Aged Out    Meningococcal (ACWY) vaccine  Aged Out       Hemoglobin A1C (%)   Date Value   07/06/2020 14.0   02/18/2020 12.3 (H)             ( goal A1C is < 7)   No results found for: LABMICR  LDL Cholesterol (mg/dL)   Date Value   02/18/2020 140 (H)       (goal LDL is <100)   AST (U/L)   Date Value   02/18/2020 20     ALT (U/L)   Date Value   02/18/2020 24     BUN (mg/dL)   Date Value   02/18/2020 9     BP Readings from Last 3 Encounters:   10/26/20 125/78   10/26/20 121/85   08/20/20 137/87          (goal 120/80)    All Future Testing planned in [facility]  Lab Frequency Next Occurrence   Creatinine, Random Urine Once 09/19/2020   Hepatitis C Antibody Once 08/20/2020   HIV Screen Once 08/20/2020   Lipid, Fasting Once 08/20/2020   Comprehensive Metabolic Panel, Fasting Once 08/20/2020   Microalbumin, Ur Once 08/20/2020   HARI STEREO BREAST BX W LOC DEVICE 1ST LESION LEFT Once

## 2020-11-10 ENCOUNTER — HOSPITAL ENCOUNTER (OUTPATIENT)
Dept: MAMMOGRAPHY | Age: 65
Discharge: HOME | End: 2020-11-12
Payer: COMMERCIAL

## 2020-11-10 VITALS — SYSTOLIC BLOOD PRESSURE: 122 MMHG | HEART RATE: 75 BPM | DIASTOLIC BLOOD PRESSURE: 78 MMHG

## 2020-11-10 PROCEDURE — 77065 DX MAMMO INCL CAD UNI: CPT

## 2020-11-10 PROCEDURE — 88305 TISSUE EXAM BY PATHOLOGIST: CPT

## 2020-11-10 PROCEDURE — 2500000003 HC RX 250 WO HCPCS

## 2020-11-10 PROCEDURE — 19081 BX BREAST 1ST LESION STRTCTC: CPT

## 2020-11-10 PROCEDURE — 88360 TUMOR IMMUNOHISTOCHEM/MANUAL: CPT

## 2020-11-16 ENCOUNTER — TELEPHONE (OUTPATIENT)
Dept: ONCOLOGY | Age: 65
End: 2020-11-16

## 2020-11-16 NOTE — TELEPHONE ENCOUNTER
Called and introduced myself to [place] as a nurse navigator at [facility] I let her know that I am her as a resource and support to her. I let her know that I will send out some information to her in the mail. Confirmed that she has appointments with Dr. Stacie muñiz and Dr. Alesia Reese on 11/19/20 at [facility] offices. I let her know that I am her to help her, answer questions, coordinate care and remove road blocks. Vance relates she does not have any questions for me right now, she feels she needs to talk to the doctor first.  I let her know that I would be in touch. Pt on pending.

## 2020-11-18 ENCOUNTER — TELEPHONE (OUTPATIENT)
Dept: ONCOLOGY | Age: 65
End: 2020-11-18

## 2020-11-18 NOTE — TELEPHONE ENCOUNTER
Noted Dr. Cassy Dutton consultation. Plan for lumpectomy. Surgery scheduled for 11/24/20. Pt to see med onc 11/19/20. Pt on pending.

## 2020-11-19 ENCOUNTER — INITIAL CONSULT (OUTPATIENT)
Dept: ONCOLOGY | Age: 65
End: 2020-11-19
Payer: COMMERCIAL

## 2020-11-19 ENCOUNTER — TELEPHONE (OUTPATIENT)
Dept: ONCOLOGY | Age: 65
End: 2020-11-19

## 2020-11-19 VITALS
WEIGHT: 256.8 LBS | TEMPERATURE: 97.8 F | HEART RATE: 73 BPM | DIASTOLIC BLOOD PRESSURE: 73 MMHG | BODY MASS INDEX: 41.27 KG/M2 | SYSTOLIC BLOOD PRESSURE: 129 MMHG | HEIGHT: 66 IN

## 2020-11-19 PROCEDURE — 99244 OFF/OP CNSLTJ NEW/EST MOD 40: CPT | Performed by: INTERNAL MEDICINE

## 2020-11-19 PROCEDURE — 99201 HC NEW PT, E/M LEVEL 1: CPT | Performed by: INTERNAL MEDICINE

## 2020-11-19 NOTE — LETTER
Marco Solis MD    11/19/2020     [place] [address], [address] [facility] [address]    Dear Nena Rivas : Thank you for referring Milo Castillo, 1955, to me for evaluation. Below are the relevant portions of my assessment and plan of care. DIAGNOSIS:   1. DCIS of the left breast, intermediate grade, ER/AR positive, diagnosed November/2020  CURRENT THERAPY:  Plan lumpectomy, to be followed by radiation and hormone treatment  BRIEF CASE HISTORY:   Milo Castillo is a very pleasant 63 y.o. female who is referred to us for management recommendation of her recently diagnosed DCIS. She recently moved from [place] and she had a screening mammogram 9/2020 that showed indeterminate calcification in the left breast.  Diagnostic mammogram followed on 10/28/2020 and showed abnormal pleomorphic calcification at the 6 o'clock position of the left breast.  Biopsy showed intermediate grade ductal carcinoma in situ that was ER/AR positive. No invasive disease appreciated. She is sent to us for a consultation, she was already seen by Dr. Iris Shah and plans are for surgery next week. The patient feels well, she denies any chest pain or shortness of breath, denies any bone pain. She is in relatively good health but due to stress, her diabetes has been poorly controlled. She has a previous history of deep venous thrombosis. Her mother had breast cancer in her 76s. No other malignancy in the family. She has not taken any exogenous estrogens, she is postmenopausal.     PAST MEDICAL HISTORY: has a past medical history of Breast cancer ([address] Diabetes mellitus ([address] Hx of blood clots, Hypertension, Kidney calculi, Neuropathy, DIONI on CPAP, Prolonged emergence from general anesthesia, and Sleep apnea. PAST SURGICAL HISTORY: has a past surgical history that includes other surgical history;  Cholecystectomy; Knee arthroscopy (Left, 1995); Tonsillectomy; Colonoscopy; Colonoscopy (10/26/2020); Colonoscopy (N/A, 10/26/2020); and HARI STEREO BREAST BX W LOC DEVICE 1ST LESION LEFT (11/10/2020). CURRENT MEDICATIONS:  has a current medication list which includes the following prescription(s): atenolol, pantoprazole, multiple vitamin, amlodipine, metformin, pioglitazone, aspirin, na sulfate-k sulfate-mg sulf, jardiance, dapagliflozin, and krill oil. ALLERGIES:  is allergic to augmentin [amoxicillin-pot clavulanate]; bactrim [sulfamethoxazole-trimethoprim]; vancomycin; and dolobid [diflunisal]. FAMILY HISTORY: family history includes Alzheimer's Disease in her mother; Breast Cancer in her mother; Diabetes type 2  in her mother; Heart Defect in her mother; Heart Disease in her father; Other in her father. SOCIAL HISTORY:  reports that she quit smoking about 17 months ago. She has a 88.00 pack-year smoking history. She has never used smokeless tobacco. She reports previous alcohol use. She reports that she does not use drugs. REVIEW OF SYSTEMS:   General: no fever or night sweats, Weight is stable. ENT: No double or blurred vision, no tinnitus or hearing problem, no dysphagia or sore throat   Respiratory: No chest pain, no shortness of breath, no cough or hemoptysis. Cardiovascular: Denies chest pain, PND or orthopnea. No L E swelling or palpitations. Gastrointestinal:    No nausea or vomiting, abdominal pain, diarrhea or constipation. Genitourinary: Denies dysuria, hematuria, frequency, urgency or incontinence. Neurological: Denies headaches, decreased LOC, no sensory or motor focal deficits. Musculoskeletal:  No arthralgia no back pain or joint swelling. Skin: There are no rashes or bleeding. Psychiatric:  No anxiety, no depression. Endocrine: Diabetes relatively poorly controlled lately. Hematologic: no bleeding , no adenopathy.     PHYSICAL EXAM: Shows a well appearing 63y.o.-year-old female who is not in pain or distress. Vital Signs: Blood pressure 129/73, pulse 73, temperature 97.8 °F (36.6 °C), temperature source Oral, height 5' 6\" (1.676 m), weight 256 lb 12.8 oz (116.5 kg). HEENT: Normocephalic and atraumatic. Pupils are equal, round, reactive to light and accommodation. Extraocular muscles are intact. Neck: Showed no JVD, no carotid bruit . Lungs: Clear to auscultation bilaterally. Heart: Regular without any murmur. Abdomen: Soft, nontender. No hepatosplenomegaly. Extremities: Lower extremities show no edema, clubbing, or cyanosis. Breasts: Examination showed a biopsy clip in the inferior aspect of the left breast.  No clear mass or adenopathy appreciated. Right breast is free of any masses or adenopathy. Neuro exam: intact cranial nerves bilaterally no motor or sensory deficit, gait is normal. Lymphatic: no adenopathy appreciated in the supraclavicular, axillary, cervical or inguinal area    REVIEW OF LABORATORY DATA:   Biopsy of the left breast 11/10/2020  Left breast, 6:00, biopsy:           Ductal carcinoma in situ, intermediate grade. Microcalcifications present. Estrogen receptor, positive (>95%). Progestin receptor, positive (>95%). REVIEW OF RADIOLOGICAL RESULTS:   Diagnostic mammogram 10/28/2020  Impression    1.  Calcifications at 6 o'clock left breast posterior depth are pleomorphic    and suspicious with tissue sampling under stereotactic mammographic guidance    advised.         2.  Small asymmetry outer central left breast persists on mammographic    workup.  Ultrasound findings in this location demonstrate appearance of a    complicated cyst identical to that seen on outside ultrasound of July 2017,    not worrisome.         BI-RADS 4 C        IMPRESSION:   1. Ductal carcinoma in situ left breast, intermediate grade, ER/RI positive  2. Some comorbidities including diabetes and history of DVT  3. Some family history of breast cancer but only in the mother above since age 79. PLAN:   I had a long discussion with the patient, she has an early breast cancer and she is scheduled for lumpectomy next week. I agree with the management plan. We talked about possibly needing radiation after her DCIS surgery depending on surgical findings. We also talked about possibly needing antiestrogen likely tamoxifen after radiation. We also discussed her pathology and imaging study in detail and I reviewed both with the patient. I think a lot of her elevated sugar is due to uncontrolled stress. We talked about ways to meditate and try to manage stress with nonpharmacological options. She has family history of breast cancer in her mother but it was above the age of 61. We will run the case by the genetic counselor but I doubt that she will qualify for genetic testing. Please proceed with surgery. We will see her after surgery and coordinate adjuvant therapy including radiation and adjuvant hormone therapy. Patient had many questions that were answered to her satisfaction                     If you have questions, please do not hesitate to call me. I look forward to following Linden Marie along with you.     Sincerely,    Yury Desai MD  Hematology/ Oncology  Cell [phone]

## 2020-11-19 NOTE — TELEPHONE ENCOUNTER
Pt was seen today by Dr. Dori Fields. Per avs, pt is scheduled for consult with RO on 12-15-20. Follow up scheduled for 01- with Dr. Dori Fields.

## 2020-11-19 NOTE — PROGRESS NOTES
DIAGNOSIS:   1. DCIS of the left breast, intermediate grade, ER/AL positive, diagnosed November/2020  CURRENT THERAPY:  Plan lumpectomy, to be followed by radiation and hormone treatment  BRIEF CASE HISTORY:   Noy Browning is a very pleasant 63 y.o. female who is referred to us for management recommendation of her recently diagnosed DCIS. She recently moved from [place] and she had a screening mammogram 9/2020 that showed indeterminate calcification in the left breast.  Diagnostic mammogram followed on 10/28/2020 and showed abnormal pleomorphic calcification at the 6 o'clock position of the left breast.  Biopsy showed intermediate grade ductal carcinoma in situ that was ER/AL positive. No invasive disease appreciated. She is sent to us for a consultation, she was already seen by Dr. Sam Marti and plans are for surgery next week. The patient feels well, she denies any chest pain or shortness of breath, denies any bone pain. She is in relatively good health but due to stress, her diabetes has been poorly controlled. She has a previous history of deep venous thrombosis. Her mother had breast cancer in her 76s. No other malignancy in the family. She has not taken any exogenous estrogens, she is postmenopausal.     PAST MEDICAL HISTORY: has a past medical history of Breast cancer ([address] mellitus [address] [address] Hx of blood clots, Hypertension, Kidney calculi, Neuropathy, DIONI on CPAP, Prolonged emergence from general anesthesia, and Sleep apnea. PAST SURGICAL HISTORY: has a past surgical history that includes other surgical history; Cholecystectomy; Knee arthroscopy (Left, 1995); Tonsillectomy; Colonoscopy; Colonoscopy (10/26/2020); Colonoscopy (N/A, 10/26/2020); and [facility] BREAST BX W LOC DEVICE 1ST LESION LEFT (11/10/2020).      CURRENT MEDICATIONS:  has a current medication list which includes the following prescription(s): atenolol, pantoprazole, multiple vitamin, amlodipine, metformin, radiation. We also discussed her pathology and imaging study in detail and I reviewed both with the patient. I think a lot of her elevated sugar is due to uncontrolled stress. We talked about ways to meditate and try to manage stress with nonpharmacological options. She has family history of breast cancer in her mother but it was above the age of 61. We will run the case by the genetic counselor but I doubt that she will qualify for genetic testing. Please proceed with surgery. We will see her after surgery and coordinate adjuvant therapy including radiation and adjuvant hormone therapy.   Patient had many questions that were answered to her satisfaction

## 2020-11-20 ENCOUNTER — HOSPITAL ENCOUNTER (OUTPATIENT)
Dept: PREADMISSION TESTING | Age: 65
Setting detail: SPECIMEN
Discharge: HOME | End: 2020-11-24
Payer: COMMERCIAL

## 2020-11-20 PROCEDURE — U0003 INFECTIOUS AGENT DETECTION BY NUCLEIC ACID (DNA OR RNA); SEVERE ACUTE RESPIRATORY SYNDROME CORONAVIRUS 2 (SARS-COV-2) (CORONAVIRUS DISEASE [COVID-19]), AMPLIFIED PROBE TECHNIQUE, MAKING USE OF HIGH THROUGHPUT TECHNOLOGIES AS DESCRIBED BY CMS-2020-01-R: HCPCS

## 2020-11-21 LAB
SARS-COV-2, RAPID: NORMAL
SARS-COV-2: NORMAL
SARS-COV-2: NOT DETECTED
SOURCE: NORMAL

## 2020-11-23 ENCOUNTER — TELEPHONE (OUTPATIENT)
Dept: ONCOLOGY | Age: 65
End: 2020-11-23

## 2020-11-23 LAB — SURGICAL PATHOLOGY REPORT: NORMAL

## 2020-11-23 NOTE — PROGRESS NOTES
I spoke with the patient and there was some confusion about her appointment with me. The patient was told she can't have surgery if her blood sugars are too high. We discussed different options but the best way to get it down and fast is insulin. I will place her on tresiba as this is once a day and have her check her blood sugars 4 times/day. She is to call me tomorrow with her readings and we will adjust the long acting insulin as she prefers to avoid multiple injections.

## 2020-11-24 ENCOUNTER — TELEPHONE (OUTPATIENT)
Dept: ONCOLOGY | Age: 65
End: 2020-11-24

## 2020-11-24 NOTE — TELEPHONE ENCOUNTER
Returned patient's phone call. Yazmin Reynosojuan a relates surgery was cancelled d/t elevated blood sugars. She relates that her doctor said bs needs to be below 200 to do surgery. Dr. Joe Mariano spoke with patient today and adjusted her diabetic medication. Yazmin Reynosojuan a will contact me when her blood sugars are at or below 200's and I will speak with surgeon about getting her back on surgery schedule. Pt has appointments with med onc and rad onc will wait to see if they need to be rescheduled.   Pt on pending list.

## 2020-11-27 ENCOUNTER — TELEPHONE (OUTPATIENT)
Dept: FAMILY MEDICINE CLINIC | Age: 65
End: 2020-11-27

## 2020-11-30 ENCOUNTER — OFFICE VISIT (OUTPATIENT)
Dept: FAMILY MEDICINE CLINIC | Age: 65
End: 2020-11-30
Payer: COMMERCIAL

## 2020-11-30 ENCOUNTER — HOSPITAL ENCOUNTER (OUTPATIENT)
Age: 65
Setting detail: SPECIMEN
Discharge: HOME | End: 2020-11-30
Payer: COMMERCIAL

## 2020-11-30 VITALS
SYSTOLIC BLOOD PRESSURE: 132 MMHG | HEART RATE: 74 BPM | OXYGEN SATURATION: 98 % | HEIGHT: 66 IN | TEMPERATURE: 96.5 F | BODY MASS INDEX: 39.89 KG/M2 | WEIGHT: 248.2 LBS | DIASTOLIC BLOOD PRESSURE: 76 MMHG

## 2020-11-30 LAB
ABSOLUTE EOS #: 0.13 K/UL (ref 0–0.44)
ABSOLUTE IMMATURE GRANULOCYTE: <0.03 K/UL (ref 0–0.3)
ABSOLUTE LYMPH #: 1.96 K/UL (ref 1.1–3.7)
ABSOLUTE MONO #: 0.62 K/UL (ref 0.1–1.2)
ALBUMIN SERPL-MCNC: 3.9 G/DL (ref 3.5–5.2)
ALBUMIN/GLOBULIN RATIO: 1.3 (ref 1–2.5)
ALP BLD-CCNC: 77 U/L (ref 35–104)
ALT SERPL-CCNC: 17 U/L (ref 5–33)
ANION GAP SERPL CALCULATED.3IONS-SCNC: 15 MMOL/L (ref 9–17)
AST SERPL-CCNC: 18 U/L
BASOPHILS # BLD: 1 % (ref 0–2)
BASOPHILS ABSOLUTE: 0.06 K/UL (ref 0–0.2)
BILIRUB SERPL-MCNC: 0.61 MG/DL (ref 0.3–1.2)
BUN BLDV-MCNC: 16 MG/DL (ref 8–23)
BUN/CREAT BLD: ABNORMAL (ref 9–20)
CALCIUM SERPL-MCNC: 9.2 MG/DL (ref 8.6–10.4)
CHLORIDE BLD-SCNC: 102 MMOL/L (ref 98–107)
CO2: 22 MMOL/L (ref 20–31)
CREAT SERPL-MCNC: 0.63 MG/DL (ref 0.5–0.9)
CREATININE URINE: 138.9 MG/DL (ref 28–217)
DIFFERENTIAL TYPE: NORMAL
EOSINOPHILS RELATIVE PERCENT: 2 % (ref 1–4)
GFR AFRICAN AMERICAN: >60 ML/MIN
GFR NON-AFRICAN AMERICAN: >60 ML/MIN
GFR SERPL CREATININE-BSD FRML MDRD: ABNORMAL ML/MIN/{1.73_M2}
GFR SERPL CREATININE-BSD FRML MDRD: ABNORMAL ML/MIN/{1.73_M2}
GLUCOSE FASTING: 202 MG/DL (ref 70–99)
HBA1C MFR BLD: 12.9 %
HCT VFR BLD CALC: 43.5 % (ref 36.3–47.1)
HEMOGLOBIN: 14 G/DL (ref 11.9–15.1)
IMMATURE GRANULOCYTES: 0 %
INR BLD: 1
LYMPHOCYTES # BLD: 27 % (ref 24–43)
MCH RBC QN AUTO: 30.5 PG (ref 25.2–33.5)
MCHC RBC AUTO-ENTMCNC: 32.2 G/DL (ref 28.4–34.8)
MCV RBC AUTO: 94.8 FL (ref 82.6–102.9)
MICROALBUMIN/CREAT 24H UR: <12 MG/L
MICROALBUMIN/CREAT UR-RTO: NORMAL MCG/MG CREAT
MONOCYTES # BLD: 9 % (ref 3–12)
NRBC AUTOMATED: 0 PER 100 WBC
PDW BLD-RTO: 12.9 % (ref 11.8–14.4)
PLATELET # BLD: 201 K/UL (ref 138–453)
PLATELET ESTIMATE: NORMAL
PMV BLD AUTO: 10.9 FL (ref 8.1–13.5)
POTASSIUM SERPL-SCNC: 4.5 MMOL/L (ref 3.7–5.3)
PROTHROMBIN TIME: 10.1 SEC (ref 9–12)
RBC # BLD: 4.59 M/UL (ref 3.95–5.11)
RBC # BLD: NORMAL 10*6/UL
SEG NEUTROPHILS: 61 % (ref 36–65)
SEGMENTED NEUTROPHILS ABSOLUTE COUNT: 4.49 K/UL (ref 1.5–8.1)
SODIUM BLD-SCNC: 139 MMOL/L (ref 135–144)
TOTAL PROTEIN: 6.9 G/DL (ref 6.4–8.3)
WBC # BLD: 7.3 K/UL (ref 3.5–11.3)
WBC # BLD: NORMAL 10*3/UL

## 2020-11-30 PROCEDURE — 83036 HEMOGLOBIN GLYCOSYLATED A1C: CPT | Performed by: FAMILY MEDICINE

## 2020-11-30 PROCEDURE — 99214 OFFICE O/P EST MOD 30 MIN: CPT | Performed by: FAMILY MEDICINE

## 2020-11-30 NOTE — PROGRESS NOTES
[address] PRIMARY CARE  [address] [place] [address] [address] [place] [address]  Dept: [phone]  Dept Fax: [phone]    Jorge Pretty is a 63 y.o. female who presents today for her medical conditions/complaints as noted below. Jorge Pretty is c/o of   Chief Complaint   Patient presents with    Diabetes     type 2 increasing numbers    Cardiac Clearance         HPI:     The patient is in the process of getting a lumpectomy for DCIS. The patient started insulin last week and has been noticing a gradual decrease in her blood sugars. Yesterday her blood sugars were 132, 162 and at night time it was 226. Prior to that the patient's blood sugars were in the 200's. The patient in the past had a hard time getting out of anesthesia when she had a cholecystectomy. It took a little while longer per her to wake up. Otherwise she had no other reactions to anesthesia. The patient denies any family history of problems with anesthesia. The patient's labs that were recently done were reviewed. The patient has not had an EKG yet but will discuss with her surgeon. She recently had her colonoscopy done but notes that she is still having bloating and abdominal fullness. Diabetes   She presents for her follow-up diabetic visit. She has type 2 diabetes mellitus. Her disease course has been improving. There are no hypoglycemic associated symptoms. There are no diabetic associated symptoms. There are no hypoglycemic complications. Symptoms are stable. There are no diabetic complications. Risk factors for coronary artery disease include diabetes mellitus, dyslipidemia, family history, hypertension, obesity, post-menopausal, sedentary lifestyle and tobacco exposure. Current diabetic treatment includes insulin injections and oral agent (dual therapy). She is compliant with treatment all of the time. Her weight is decreasing steadily. She is following a diabetic diet.  Meal planning includes avoidance of concentrated sweets. She has not had a previous visit with a dietitian. She rarely participates in exercise. Her home blood glucose trend is decreasing steadily. Her breakfast blood glucose is taken between 6-7 am. Her breakfast blood glucose range is generally 130-140 mg/dl. Her lunch blood glucose is taken between 1-2 pm. Her lunch blood glucose range is generally 140-180 mg/dl. Her dinner blood glucose is taken after 8 pm. Her dinner blood glucose range is generally >200 mg/dl. An ACE inhibitor/angiotensin II receptor blocker is not being taken. She does not see a podiatrist.Eye exam is current. Hemoglobin A1C (%)   Date Value   11/30/2020 12.9   07/06/2020 14.0   02/18/2020 12.3 (H)             ( goal A1Cis < 7)   Microalb/Crt.  Ratio (mcg/mg creat)   Date Value   11/30/2020 CANNOT BE CALCULATED     LDL Cholesterol (mg/dL)   Date Value   02/18/2020 140 (H)       (goal LDL is <100)   AST (U/L)   Date Value   11/30/2020 18     ALT (U/L)   Date Value   11/30/2020 17     BUN (mg/dL)   Date Value   11/30/2020 16     BP Readings from Last 3 Encounters:   11/30/20 132/76   11/19/20 129/73   11/10/20 122/78          (goal 120/80)    Past Medical History:   Diagnosis Date    [facility]     Diabetes mellitus ([facility]     Hx of blood clots     left leg    Hypertension     Kidney calculi     passed on her own    Neuropathy     DIONI on CPAP     uses nightly    Prolonged emergence from general anesthesia     Sleep apnea       Past Surgical History:   Procedure Laterality Date    CHOLECYSTECTOMY      COLONOSCOPY      removed polyps    COLONOSCOPY  10/26/2020    with Biopsy    COLONOSCOPY N/A 10/26/2020    COLONOSCOPY POLYPECTOMY  BIOPSY performed by Alicia Lozada MD at [address] Left 1995    [facility] BREAST BIOPSY LEFT  11/10/2020    [facility] BREAST BIOPSY LEFT 11/10/2020 [facility]    OTHER SURGICAL HISTORY      DNC for post menapausal bleeding    TONSILLECTOMY         Family History   Problem Relation Age of Onset    Breast Cancer Mother     Heart Defect Mother         Murmur     Alzheimer's Disease Mother     Diabetes type 2  Mother     Heart Disease Father     Other Father         AA       Social History     Tobacco Use    Smoking status: Former Smoker     Packs/day: 2.00     Years: 44.00     Pack years: 88.00     Last attempt to quit: 2019     Years since quittin.5    Smokeless tobacco: Never Used   Substance Use Topics    Alcohol use: Not Currently      Current Outpatient Medications   Medication Sig Dispense Refill    Insulin Degludec 200 UNIT/ML SOPN Inject 26 Units into the skin daily 3 mL 0    Insulin Pen Needle 32G X 4 MM MISC 1 each by Does not apply route daily 100 each 3    atenolol (TENORMIN) 50 MG tablet TAKE 1 TABLET BY MOUTH EVERY DAY 90 tablet 1    pantoprazole (PROTONIX) 40 MG tablet TAKE 1 TABLET BY MOUTH EVERY DAY 90 tablet 1    Multiple Vitamin (MULTI-VITAMIN DAILY PO) Take by mouth daily      amLODIPine (NORVASC) 5 MG tablet Take 1 tablet by mouth daily 90 tablet 1    metFORMIN (GLUCOPHAGE) 500 MG tablet Take 1 tablet by mouth 2 times daily (with meals) 180 tablet 1    pioglitazone (ACTOS) 30 MG tablet Take 1 tablet by mouth daily 90 tablet 1    aspirin 81 MG tablet Take 81 mg by mouth daily      Krill Oil 350 MG CAPS Take by mouth       No current facility-administered medications for this visit.       Allergies   Allergen Reactions    Augmentin [Amoxicillin-Pot Clavulanate] Nausea And Vomiting    Bactrim [Sulfamethoxazole-Trimethoprim] Nausea And Vomiting    Vancomycin Rash    Dolobid [Diflunisal] Nausea And Vomiting       Health Maintenance   Topic Date Due    Hepatitis C screen  1955    HIV screen  1972    Shingles Vaccine (1 of 2) 2021 (Originally 2007)    Lipid screen  2021    A1C test (Diabetic or Prediabetic)  2021    Diabetic foot sounds: Normal heart sounds. No murmur. No friction rub. No gallop. Pulmonary:      Effort: Pulmonary effort is normal. No respiratory distress. Breath sounds: Normal breath sounds. No stridor. No wheezing, rhonchi or rales. Abdominal:      General: Abdomen is flat. Bowel sounds are normal.      Palpations: Abdomen is soft. Musculoskeletal: Normal range of motion. Skin:     General: Skin is warm and dry. Neurological:      General: No focal deficit present. Mental Status: She is alert and oriented to person, place, and time. Mental status is at baseline. Cranial Nerves: No cranial nerve deficit. Sensory: No sensory deficit. Motor: No weakness. Coordination: Coordination normal.      Gait: Gait normal.   Psychiatric:         Attention and Perception: Attention normal.         Mood and Affect: Mood and affect normal.         Speech: Speech normal.         Behavior: Behavior normal.         Thought Content: Thought content normal.         Judgment: Judgment normal.       /76   Pulse 74   Temp 96.5 °F (35.8 °C)   Ht 5' 6\" (1.676 m)   Wt 248 lb 3.2 oz (112.6 kg)   SpO2 98%   BMI 40.06 kg/m²     Assessment:       Diagnosis Orders   1. Type 2 diabetes mellitus with hyperglycemia, without long-term current use of insulin ([facility])  POCT glycosylated hemoglobin (Hb A1C)    Insulin Degludec 200 UNIT/ML SOPN    CBC Auto Differential    Comprehensive Metabolic Panel, Fasting    Protime-INR    Microalbumin, Ur   2. Pre-operative clearance  CBC Auto Differential    Comprehensive Metabolic Panel, Fasting    Protime-INR    Microalbumin, Ur   3. Epigastric pain  CT ABDOMEN PELVIS W IV CONTRAST Additional Contrast? Oral    Microalbumin, Ur   4. Bloating  CT ABDOMEN PELVIS W IV CONTRAST Additional Contrast? Oral    Microalbumin, Ur   5. Elevated lipoprotein(a)     6. Severe obesity (BMI >= 40) ([facility])     7. Essential hypertension     8.  Ductal carcinoma in situ (DCIS) of left breast Plan:    DM-2 - patient placed on insulin due to need for tight glycemic control prior to being able to schedule the surgery for lumpectomy for DCIS. The patient has also been increasing her protein and decreasing her carb intake. The patient's blood sugars are steadily decreasing and her a1c has decreased to 12.9 from 14. Patient insulin adjusted today and patient encouraged to check blood sugars 3-4 times/day. We will need to start lisionpril at next appointment as well    Epigastric pain/bloating - get CT due to symptoms. May be diabetic gastroparesis or IBS type symptoms. Hyperlipid - patient needs to be on a statin and the patient will be encouraged at each visit    Obesity - patient is actively decreasing her carbs and the patient was encouraged to continue to eat protein and decrease carb intake    HTN - well controlled but we will further discuss the need to start ace next visit    DCIS - from my perspective, pending the patient's EKG the patient will be cleared for surgery      Return in about 3 months (around 2/28/2021) for diabetes. Orders Placed This Encounter   Procedures    CT ABDOMEN PELVIS W IV CONTRAST Additional Contrast? Oral     Standing Status:   Future     Standing Expiration Date:   11/30/2021     Order Specific Question:   Additional Contrast?     Answer:   Oral     Order Specific Question:   Reason for exam:     Answer:   abdominal pain, bloating    CBC Auto Differential     Standing Status:   Future     Number of Occurrences:   1     Standing Expiration Date:   11/30/2021    Comprehensive Metabolic Panel, Fasting     Standing Status:   Future     Number of Occurrences:   1     Standing Expiration Date:   11/30/2021    Protime-INR     Standing Status:   Future     Number of Occurrences:   1     Standing Expiration Date:   11/30/2021     Order Specific Question:   Daily Coumadin Dose?      Answer:   none    Microalbumin, Ur     Standing Status:   Future     Number of Occurrences:   1     Standing Expiration Date:   11/30/2021    POCT glycosylated hemoglobin (Hb A1C)     Orders Placed This Encounter   Medications    Insulin Degludec 200 UNIT/ML SOPN     Sig: Inject 26 Units into the skin daily     Dispense:  3 mL     Refill:  0       Patient given educational materials - see patient instructions. Discussed use, benefit, and side effects of prescribed medications. All patientquestions answered. Pt voiced understanding. Reviewed health maintenance. Instructedto continue current medications, diet and exercise. Patient agreed with treatmentplan. Follow up as directed.      Electronically signed by Jolanta Huerta MD on 12/5/2020 at 7:14 AM

## 2020-12-02 ENCOUNTER — TELEPHONE (OUTPATIENT)
Dept: RADIATION ONCOLOGY | Age: 65
End: 2020-12-02

## 2020-12-02 NOTE — TELEPHONE ENCOUNTER
Received call from surgery depart stating pts surgery was rescheduled for 12/15/20. Therefore, we moved our RO consult appt out to 1/5/21.   I called pt and advised, appt new date/time for rad/onc consult w/Dr. Rosalind Samuel

## 2020-12-05 PROBLEM — D05.11 DUCTAL CARCINOMA IN SITU (DCIS) OF RIGHT BREAST: Chronic | Status: ACTIVE | Noted: 2020-12-05

## 2020-12-05 PROBLEM — D05.12 DUCTAL CARCINOMA IN SITU (DCIS) OF LEFT BREAST: Chronic | Status: ACTIVE | Noted: 2020-12-05

## 2020-12-05 ASSESSMENT — ENCOUNTER SYMPTOMS
GASTROINTESTINAL NEGATIVE: 1
RESPIRATORY NEGATIVE: 1
EYES NEGATIVE: 1

## 2020-12-07 ENCOUNTER — HOSPITAL ENCOUNTER (OUTPATIENT)
Dept: PREADMISSION TESTING | Age: 65
Discharge: HOME | End: 2020-12-11
Payer: COMMERCIAL

## 2020-12-07 VITALS
OXYGEN SATURATION: 100 % | TEMPERATURE: 98.1 F | RESPIRATION RATE: 20 BRPM | BODY MASS INDEX: 40.34 KG/M2 | SYSTOLIC BLOOD PRESSURE: 141 MMHG | WEIGHT: 251 LBS | HEART RATE: 81 BPM | HEIGHT: 66 IN | DIASTOLIC BLOOD PRESSURE: 74 MMHG

## 2020-12-07 ASSESSMENT — PAIN SCALES - GENERAL: PAINLEVEL_OUTOF10: 0

## 2020-12-07 NOTE — PRE-PROCEDURE INSTRUCTIONS
ARRIVE AT [facility] [address] ON Tuesday 12/15/2020. Be here at 10;00am for needle localization and OR at 1:00pm.   Please call [phone]   fabrik test 12/11/2020    Once you enter the hospital lobby, take the elevators to the second floor. Check-In is at the surgery registration desk. Continue to take your home medications as you normally do up to and including the night before surgery with the exception of any blood thinning medications. Please stop any blood thinning medications as directed by your surgeon or prescribing physician. Failure to stop certain medications may interfere with your scheduled surgery. These may include:  Aspirin, Warfarin (Coumadin), Clopidogrel (Plavix), Ibuprofen (Motrin, Advil), Naproxen (Aleve), Meloxicam (Mobic), Celecoxib (Celebrex), Eliquis, Pradaxa, Xarelto, Effient, Fish Oil, Herbal supplements. Aspirin, krill oil    If you are diabetic, do not take any of your diabetic medications by mouth the morning of surgery. If you are taking insulin contact the doctor that manages your diabetes for instructions about any changes to your insulin dosages the day before surgery. Do not inject insulin or other injectable diabetic medications the morning of surgery unless otherwise instructed by the doctor who manages your diabetes. Please take the following medication(s) the day of surgery with a small sip of water:  Tenormin, protonix  Please use your inhalers at home the day of surgery. PREPARING FOR YOUR SURGERY:     Before surgery, you can play an important role in your own health. Because skin is not sterile, we need to be sure that your skin is as free of germs as possible before surgery by carefully washing before surgery. Preparing or prepping skin before surgery can reduce the risk of a surgical site infection.   Do not shave the area of your body where your surgery will be performed unless you received specific permission from your physician.     You will need to shower at home the night before surgery and the morning of surgery with a special soap called chlorhexidine gluconate (CHG*). *Not to be used by people allergic to Chlorhexidine Gluconate (CHG). Following these instructions will help you be sure that your skin is clean before surgery. Instructions on cleaning your skin before surgery: The night before your surgery:      You will need to shower with warm water (not hot) and the CHG soap.  Use a clean wash cloth and a clean towel. Have clean clothes available to put on after the shower.    First wash your hair with regular shampoo. Rinse your hair and body thoroughly to remove the shampoo. Marivel Bowman Wash your face with your regular soap or water only. Thoroughly rinse your body with warm water from the neck down.  Turn water off to prevent rinsing the soap off too soon.  With a clean wet washcloth and half of the CHG soap in the bottle, lather your entire body from the neck down. Do not use CHG soap near your eyes or ears to avoid injury to those areas.  Wash thoroughly, paying special attention to the area where your surgery will be performed.  Wash your body gently for five (5) minutes. Avoid scrubbing your skin too hard.  Turn the water back on and rinse your body thoroughly.  Pat yourself dry with a clean, soft towel. Do not apply lotion, cream or powder.  Dress with clean freshly washed clothes. The morning of surgery:     Repeat shower following steps above - using remaining half of CHG soap in bottle. Patient Instructions:    Marivel Bowman If you are having any type of anesthesia you are to have nothing to eat or drink after midnight the night before your surgery. This includes gum, mints, water or smoking or chewing tobacco.  The only exception to this is a small sip of water to take with any morning dose of heart, blood pressure, or seizure medications.   No alcoholic beverages for 24 hours prior to surgery.  Bring a list of all medications you take, along with the dose of the medications and how often you take it. If more convenient bring the pharmacy bottles in a zip lock bag.  Brush your teeth but do not swallow water.  Bring your eyeglasses and case with you. No contacts are to be worn the day of surgery. You also may bring your hearing aids. Most surgical procedures involving anesthesia will require that you remove your dentures prior to surgery.  If you are on C-PAP or Bi-PAP at home and plan on staying in the hospital overnight for your surgery please bring the machine with you. · Do not wear any jewelry or body piercings day of surgery. Also, NO lotion, perfume or deodorant to be used the day of surgery. No nail polish on the operative extremity (arm/leg surgeries)    · Do not bring any valuables such as jewelry, cash, or credit cards. If you are staying overnight with us, please bring a small bag of personal items.  Please wear loose, comfortable clothing. If you are potentially going to have a cast or brace bring clothing that will fit over them.  In case of illness - If you have cold or flu like symptoms (high fever, runny nose, sore throat, cough, etc.) rash, nausea, vomiting, loose stools, and/or recent contact with someone who has a contagious disease (chicken pox, measles, etc.) Please call your doctor before coming to the hospital.     Day of Surgery/Procedure:    As a patient at [place] [facility] [address] you can expect quality medical and nursing care that is centered on your individual needs. Our goal is to make your surgical experience as comfortable as possible    . Transportation After Your Surgery/Procedure: You will need a friend or family member to drive you home after your procedure.   Your  must be 25years of age or older and able to sign off on your discharge instructions. A taxi cab or any other form of public transportation is not acceptable. Your friend or family member must stay at the hospital throughout your procedure. Someone must remain with you for the first 24 hours after your surgery if you receive anesthesia or medication. If you do not have someone to stay with you, your procedure may be cancelled.       If you have any other questions regarding your procedure or the day of surgery, please call [phone]      _________________________  ____________________________  Signature (Patient)              Signature (Provider) & date

## 2020-12-08 LAB
EKG ATRIAL RATE: 66 BPM
EKG P AXIS: 27 DEGREES
EKG P-R INTERVAL: 130 MS
EKG Q-T INTERVAL: 418 MS
EKG QRS DURATION: 82 MS
EKG QTC CALCULATION (BAZETT): 438 MS
EKG R AXIS: 11 DEGREES
EKG T AXIS: 20 DEGREES
EKG VENTRICULAR RATE: 66 BPM

## 2020-12-09 ENCOUNTER — HOSPITAL ENCOUNTER (OUTPATIENT)
Dept: CT IMAGING | Facility: CLINIC | Age: 65
Discharge: HOME | End: 2020-12-11
Payer: COMMERCIAL

## 2020-12-09 PROCEDURE — 74177 CT ABD & PELVIS W/CONTRAST: CPT

## 2020-12-09 PROCEDURE — 2580000003 HC RX 258: Performed by: FAMILY MEDICINE

## 2020-12-09 PROCEDURE — 6360000004 HC RX CONTRAST MEDICATION: Performed by: FAMILY MEDICINE

## 2020-12-09 RX ORDER — 0.9 % SODIUM CHLORIDE 0.9 %
80 INTRAVENOUS SOLUTION INTRAVENOUS ONCE
Status: COMPLETED | OUTPATIENT
Start: 2020-12-09 | End: 2020-12-09

## 2020-12-09 RX ORDER — SODIUM CHLORIDE 0.9 % (FLUSH) 0.9 %
10 SYRINGE (ML) INJECTION PRN
Status: DISCONTINUED | OUTPATIENT
Start: 2020-12-09 | End: 2020-12-12 | Stop reason: HOSPADM

## 2020-12-09 RX ADMIN — SODIUM CHLORIDE 80 ML: 9 INJECTION, SOLUTION INTRAVENOUS at 10:07

## 2020-12-09 RX ADMIN — Medication 10 ML: at 10:07

## 2020-12-09 RX ADMIN — IOVERSOL 75 ML: 741 INJECTION INTRA-ARTERIAL; INTRAVENOUS at 10:06

## 2020-12-09 RX ADMIN — IOHEXOL 50 ML: 240 INJECTION, SOLUTION INTRATHECAL; INTRAVASCULAR; INTRAVENOUS; ORAL at 10:03

## 2020-12-11 ENCOUNTER — HOSPITAL ENCOUNTER (OUTPATIENT)
Dept: LAB | Age: 65
Setting detail: SPECIMEN
Discharge: HOME | End: 2020-12-11
Payer: COMMERCIAL

## 2020-12-11 PROCEDURE — U0003 INFECTIOUS AGENT DETECTION BY NUCLEIC ACID (DNA OR RNA); SEVERE ACUTE RESPIRATORY SYNDROME CORONAVIRUS 2 (SARS-COV-2) (CORONAVIRUS DISEASE [COVID-19]), AMPLIFIED PROBE TECHNIQUE, MAKING USE OF HIGH THROUGHPUT TECHNOLOGIES AS DESCRIBED BY CMS-2020-01-R: HCPCS

## 2020-12-12 LAB
SARS-COV-2, RAPID: NORMAL
SARS-COV-2: NORMAL
SARS-COV-2: NOT DETECTED
SOURCE: NORMAL

## 2020-12-15 ENCOUNTER — APPOINTMENT (OUTPATIENT)
Dept: MAMMOGRAPHY | Age: 65
End: 2020-12-15
Attending: SURGERY
Payer: COMMERCIAL

## 2020-12-15 ENCOUNTER — HOSPITAL ENCOUNTER (OUTPATIENT)
Dept: MAMMOGRAPHY | Age: 65
Discharge: HOME | End: 2020-12-17
Payer: COMMERCIAL

## 2020-12-15 ENCOUNTER — ANESTHESIA (OUTPATIENT)
Dept: OPERATING ROOM | Age: 65
End: 2020-12-15
Payer: COMMERCIAL

## 2020-12-15 ENCOUNTER — HOSPITAL ENCOUNTER (OUTPATIENT)
Age: 65
Setting detail: OUTPATIENT SURGERY
Discharge: HOME | End: 2020-12-15
Attending: SURGERY | Admitting: SURGERY
Payer: COMMERCIAL

## 2020-12-15 ENCOUNTER — ANESTHESIA EVENT (OUTPATIENT)
Dept: OPERATING ROOM | Age: 65
End: 2020-12-15
Payer: COMMERCIAL

## 2020-12-15 VITALS
TEMPERATURE: 96 F | HEART RATE: 72 BPM | RESPIRATION RATE: 20 BRPM | SYSTOLIC BLOOD PRESSURE: 127 MMHG | DIASTOLIC BLOOD PRESSURE: 77 MMHG | OXYGEN SATURATION: 98 %

## 2020-12-15 VITALS — SYSTOLIC BLOOD PRESSURE: 132 MMHG | DIASTOLIC BLOOD PRESSURE: 76 MMHG | TEMPERATURE: 97 F | OXYGEN SATURATION: 98 %

## 2020-12-15 VITALS
RESPIRATION RATE: 18 BRPM | HEART RATE: 69 BPM | DIASTOLIC BLOOD PRESSURE: 70 MMHG | BODY MASS INDEX: 40.51 KG/M2 | WEIGHT: 251 LBS | TEMPERATURE: 97.2 F | SYSTOLIC BLOOD PRESSURE: 125 MMHG | OXYGEN SATURATION: 95 %

## 2020-12-15 LAB
GLUCOSE BLD-MCNC: 142 MG/DL (ref 65–105)
GLUCOSE BLD-MCNC: 195 MG/DL (ref 65–105)

## 2020-12-15 PROCEDURE — 3700000000 HC ANESTHESIA ATTENDED CARE: Performed by: SURGERY

## 2020-12-15 PROCEDURE — 6360000002 HC RX W HCPCS: Performed by: SURGERY

## 2020-12-15 PROCEDURE — 76098 X-RAY EXAM SURGICAL SPECIMEN: CPT

## 2020-12-15 PROCEDURE — 7100000011 HC PHASE II RECOVERY - ADDTL 15 MIN: Performed by: SURGERY

## 2020-12-15 PROCEDURE — 88305 TISSUE EXAM BY PATHOLOGIST: CPT

## 2020-12-15 PROCEDURE — 19281 PERQ DEVICE BREAST 1ST IMAG: CPT

## 2020-12-15 PROCEDURE — 7100000010 HC PHASE II RECOVERY - FIRST 15 MIN: Performed by: SURGERY

## 2020-12-15 PROCEDURE — 3600000012 HC SURGERY LEVEL 2 ADDTL 15MIN: Performed by: SURGERY

## 2020-12-15 PROCEDURE — 2709999900 HC NON-CHARGEABLE SUPPLY: Performed by: SURGERY

## 2020-12-15 PROCEDURE — 88307 TISSUE EXAM BY PATHOLOGIST: CPT

## 2020-12-15 PROCEDURE — 6360000002 HC RX W HCPCS: Performed by: NURSE ANESTHETIST, CERTIFIED REGISTERED

## 2020-12-15 PROCEDURE — 7100000000 HC PACU RECOVERY - FIRST 15 MIN: Performed by: SURGERY

## 2020-12-15 PROCEDURE — 2500000003 HC RX 250 WO HCPCS: Performed by: SURGERY

## 2020-12-15 PROCEDURE — 82947 ASSAY GLUCOSE BLOOD QUANT: CPT

## 2020-12-15 PROCEDURE — 7100000001 HC PACU RECOVERY - ADDTL 15 MIN: Performed by: SURGERY

## 2020-12-15 PROCEDURE — 2580000003 HC RX 258: Performed by: ANESTHESIOLOGY

## 2020-12-15 PROCEDURE — 3700000001 HC ADD 15 MINUTES (ANESTHESIA): Performed by: SURGERY

## 2020-12-15 PROCEDURE — 2500000003 HC RX 250 WO HCPCS

## 2020-12-15 PROCEDURE — 2500000003 HC RX 250 WO HCPCS: Performed by: NURSE ANESTHETIST, CERTIFIED REGISTERED

## 2020-12-15 PROCEDURE — 3600000002 HC SURGERY LEVEL 2 BASE: Performed by: SURGERY

## 2020-12-15 RX ORDER — HYDROCODONE BITARTRATE AND ACETAMINOPHEN 5; 325 MG/1; MG/1
1 TABLET ORAL PRN
Status: DISCONTINUED | OUTPATIENT
Start: 2020-12-15 | End: 2020-12-15 | Stop reason: HOSPADM

## 2020-12-15 RX ORDER — BUPIVACAINE HYDROCHLORIDE AND EPINEPHRINE 5; 5 MG/ML; UG/ML
INJECTION, SOLUTION EPIDURAL; INTRACAUDAL; PERINEURAL PRN
Status: DISCONTINUED | OUTPATIENT
Start: 2020-12-15 | End: 2020-12-15 | Stop reason: ALTCHOICE

## 2020-12-15 RX ORDER — FENTANYL CITRATE 50 UG/ML
INJECTION, SOLUTION INTRAMUSCULAR; INTRAVENOUS PRN
Status: DISCONTINUED | OUTPATIENT
Start: 2020-12-15 | End: 2020-12-15 | Stop reason: SDUPTHER

## 2020-12-15 RX ORDER — HYDROCODONE BITARTRATE AND ACETAMINOPHEN 5; 325 MG/1; MG/1
2 TABLET ORAL PRN
Status: DISCONTINUED | OUTPATIENT
Start: 2020-12-15 | End: 2020-12-15 | Stop reason: HOSPADM

## 2020-12-15 RX ORDER — ONDANSETRON 2 MG/ML
4 INJECTION INTRAMUSCULAR; INTRAVENOUS
Status: DISCONTINUED | OUTPATIENT
Start: 2020-12-15 | End: 2020-12-15 | Stop reason: HOSPADM

## 2020-12-15 RX ORDER — DOCUSATE SODIUM 100 MG/1
100 CAPSULE, LIQUID FILLED ORAL 2 TIMES DAILY PRN
Qty: 60 CAPSULE | Refills: 0 | Status: SHIPPED | OUTPATIENT
Start: 2020-12-15 | End: 2021-01-14

## 2020-12-15 RX ORDER — FENTANYL CITRATE 50 UG/ML
50 INJECTION, SOLUTION INTRAMUSCULAR; INTRAVENOUS EVERY 5 MIN PRN
Status: DISCONTINUED | OUTPATIENT
Start: 2020-12-15 | End: 2020-12-15 | Stop reason: HOSPADM

## 2020-12-15 RX ORDER — SODIUM CHLORIDE 0.9 % (FLUSH) 0.9 %
10 SYRINGE (ML) INJECTION PRN
Status: DISCONTINUED | OUTPATIENT
Start: 2020-12-15 | End: 2020-12-15 | Stop reason: HOSPADM

## 2020-12-15 RX ORDER — LIDOCAINE HYDROCHLORIDE 10 MG/ML
1 INJECTION, SOLUTION EPIDURAL; INFILTRATION; INTRACAUDAL; PERINEURAL
Status: DISCONTINUED | OUTPATIENT
Start: 2020-12-15 | End: 2020-12-15 | Stop reason: HOSPADM

## 2020-12-15 RX ORDER — FENTANYL CITRATE 50 UG/ML
25 INJECTION, SOLUTION INTRAMUSCULAR; INTRAVENOUS EVERY 5 MIN PRN
Status: DISCONTINUED | OUTPATIENT
Start: 2020-12-15 | End: 2020-12-15 | Stop reason: HOSPADM

## 2020-12-15 RX ORDER — DEXAMETHASONE SODIUM PHOSPHATE 10 MG/ML
INJECTION, SOLUTION INTRAMUSCULAR; INTRAVENOUS PRN
Status: DISCONTINUED | OUTPATIENT
Start: 2020-12-15 | End: 2020-12-15 | Stop reason: SDUPTHER

## 2020-12-15 RX ORDER — SODIUM CHLORIDE 9 MG/ML
INJECTION, SOLUTION INTRAVENOUS CONTINUOUS
Status: DISCONTINUED | OUTPATIENT
Start: 2020-12-15 | End: 2020-12-15

## 2020-12-15 RX ORDER — SODIUM CHLORIDE 0.9 % (FLUSH) 0.9 %
10 SYRINGE (ML) INJECTION EVERY 12 HOURS SCHEDULED
Status: DISCONTINUED | OUTPATIENT
Start: 2020-12-15 | End: 2020-12-15 | Stop reason: HOSPADM

## 2020-12-15 RX ORDER — ONDANSETRON 2 MG/ML
INJECTION INTRAMUSCULAR; INTRAVENOUS PRN
Status: DISCONTINUED | OUTPATIENT
Start: 2020-12-15 | End: 2020-12-15 | Stop reason: SDUPTHER

## 2020-12-15 RX ORDER — MIDAZOLAM HYDROCHLORIDE 1 MG/ML
INJECTION INTRAMUSCULAR; INTRAVENOUS PRN
Status: DISCONTINUED | OUTPATIENT
Start: 2020-12-15 | End: 2020-12-15 | Stop reason: SDUPTHER

## 2020-12-15 RX ORDER — LIDOCAINE HYDROCHLORIDE 20 MG/ML
INJECTION, SOLUTION EPIDURAL; INFILTRATION; INTRACAUDAL; PERINEURAL PRN
Status: DISCONTINUED | OUTPATIENT
Start: 2020-12-15 | End: 2020-12-15 | Stop reason: SDUPTHER

## 2020-12-15 RX ORDER — PROPOFOL 10 MG/ML
INJECTION, EMULSION INTRAVENOUS PRN
Status: DISCONTINUED | OUTPATIENT
Start: 2020-12-15 | End: 2020-12-15 | Stop reason: SDUPTHER

## 2020-12-15 RX ORDER — SODIUM CHLORIDE, SODIUM LACTATE, POTASSIUM CHLORIDE, CALCIUM CHLORIDE 600; 310; 30; 20 MG/100ML; MG/100ML; MG/100ML; MG/100ML
INJECTION, SOLUTION INTRAVENOUS CONTINUOUS
Status: DISCONTINUED | OUTPATIENT
Start: 2020-12-15 | End: 2020-12-15 | Stop reason: HOSPADM

## 2020-12-15 RX ORDER — HYDROCODONE BITARTRATE AND ACETAMINOPHEN 5; 325 MG/1; MG/1
1 TABLET ORAL EVERY 6 HOURS PRN
Qty: 15 TABLET | Refills: 0 | Status: SHIPPED | OUTPATIENT
Start: 2020-12-15 | End: 2020-12-18

## 2020-12-15 RX ADMIN — Medication 25 MCG: at 13:34

## 2020-12-15 RX ADMIN — MIDAZOLAM 2 MG: 1 INJECTION INTRAMUSCULAR; INTRAVENOUS at 13:02

## 2020-12-15 RX ADMIN — CEFAZOLIN 2 G: 10 INJECTION, POWDER, FOR SOLUTION INTRAVENOUS at 13:19

## 2020-12-15 RX ADMIN — LIDOCAINE HYDROCHLORIDE 100 MG: 20 INJECTION, SOLUTION EPIDURAL; INFILTRATION; INTRACAUDAL; PERINEURAL at 13:10

## 2020-12-15 RX ADMIN — Medication 25 MCG: at 13:58

## 2020-12-15 RX ADMIN — PROPOFOL 200 MG: 10 INJECTION, EMULSION INTRAVENOUS at 13:10

## 2020-12-15 RX ADMIN — ONDANSETRON 4 MG: 2 INJECTION, SOLUTION INTRAMUSCULAR; INTRAVENOUS at 13:15

## 2020-12-15 RX ADMIN — SODIUM CHLORIDE, POTASSIUM CHLORIDE, SODIUM LACTATE AND CALCIUM CHLORIDE: 600; 310; 30; 20 INJECTION, SOLUTION INTRAVENOUS at 11:03

## 2020-12-15 RX ADMIN — Medication 25 MCG: at 13:19

## 2020-12-15 RX ADMIN — DEXAMETHASONE SODIUM PHOSPHATE 10 MG: 10 INJECTION INTRAMUSCULAR; INTRAVENOUS at 13:15

## 2020-12-15 RX ADMIN — Medication 25 MCG: at 14:33

## 2020-12-15 ASSESSMENT — PULMONARY FUNCTION TESTS
PIF_VALUE: 3
PIF_VALUE: 1
PIF_VALUE: 1
PIF_VALUE: 3
PIF_VALUE: 10
PIF_VALUE: 10
PIF_VALUE: 9
PIF_VALUE: 11
PIF_VALUE: 3
PIF_VALUE: 9
PIF_VALUE: 3
PIF_VALUE: 10
PIF_VALUE: 3
PIF_VALUE: 4
PIF_VALUE: 8
PIF_VALUE: 10
PIF_VALUE: 11
PIF_VALUE: 3
PIF_VALUE: 8
PIF_VALUE: 4
PIF_VALUE: 11
PIF_VALUE: 8
PIF_VALUE: 3
PIF_VALUE: 11
PIF_VALUE: 10
PIF_VALUE: 4
PIF_VALUE: 12
PIF_VALUE: 11
PIF_VALUE: 4
PIF_VALUE: 9
PIF_VALUE: 4
PIF_VALUE: 10
PIF_VALUE: 4
PIF_VALUE: 10
PIF_VALUE: 10
PIF_VALUE: 8
PIF_VALUE: 4
PIF_VALUE: 3
PIF_VALUE: 11
PIF_VALUE: 10
PIF_VALUE: 1
PIF_VALUE: 11
PIF_VALUE: 4
PIF_VALUE: 3
PIF_VALUE: 4
PIF_VALUE: 11
PIF_VALUE: 11
PIF_VALUE: 4
PIF_VALUE: 4
PIF_VALUE: 3
PIF_VALUE: 11
PIF_VALUE: 4
PIF_VALUE: 10
PIF_VALUE: 3
PIF_VALUE: 10
PIF_VALUE: 3
PIF_VALUE: 9
PIF_VALUE: 8
PIF_VALUE: 4
PIF_VALUE: 10
PIF_VALUE: 3
PIF_VALUE: 4
PIF_VALUE: 10
PIF_VALUE: 9
PIF_VALUE: 10
PIF_VALUE: 4
PIF_VALUE: 9
PIF_VALUE: 12
PIF_VALUE: 4
PIF_VALUE: 4
PIF_VALUE: 0
PIF_VALUE: 11
PIF_VALUE: 11
PIF_VALUE: 5
PIF_VALUE: 3
PIF_VALUE: 4
PIF_VALUE: 10
PIF_VALUE: 8
PIF_VALUE: 9
PIF_VALUE: 9
PIF_VALUE: 2
PIF_VALUE: 11
PIF_VALUE: 3
PIF_VALUE: 2
PIF_VALUE: 4
PIF_VALUE: 11
PIF_VALUE: 4
PIF_VALUE: 10
PIF_VALUE: 0
PIF_VALUE: 10
PIF_VALUE: 4
PIF_VALUE: 4
PIF_VALUE: 9
PIF_VALUE: 3
PIF_VALUE: 9
PIF_VALUE: 4
PIF_VALUE: 3
PIF_VALUE: 3
PIF_VALUE: 9
PIF_VALUE: 4
PIF_VALUE: 1
PIF_VALUE: 3
PIF_VALUE: 3
PIF_VALUE: 4
PIF_VALUE: 9
PIF_VALUE: 4
PIF_VALUE: 4
PIF_VALUE: 11
PIF_VALUE: 11
PIF_VALUE: 4
PIF_VALUE: 8
PIF_VALUE: 3
PIF_VALUE: 11

## 2020-12-15 ASSESSMENT — PAIN SCALES - GENERAL
PAINLEVEL_OUTOF10: 0

## 2020-12-15 ASSESSMENT — PAIN - FUNCTIONAL ASSESSMENT: PAIN_FUNCTIONAL_ASSESSMENT: 0-10
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
## 2020-12-15 NOTE — ANESTHESIA PRE PROCEDURE
Department of Anesthesiology  Preprocedure Note       Name:  Luis Mancera   Age:  63 y.o.  :  1955                                          MRN:  [id number]         Date:  12/15/2020      Surgeon: Kenyatta Siu):  Azra Lynch DO    Procedure: Procedure(s):  LEFT BREAST LUMPECTOMY WITH   NEEDLE LOCALIZATION (11:30)    Medications prior to admission:   Prior to Admission medications    Medication Sig Start Date End Date Taking?  Authorizing Provider   Insulin Degludec 200 UNIT/ML SOPN Inject 26 Units into the skin daily 20  Yes Monica Chapman MD   atenolol (TENORMIN) 50 MG tablet TAKE 1 TABLET BY MOUTH EVERY DAY 20  Yes Monica Chapman MD   pantoprazole (PROTONIX) 40 MG tablet TAKE 1 TABLET BY MOUTH EVERY DAY 20  Yes Monica Chapman MD   Multiple Vitamin (MULTI-VITAMIN DAILY PO) Take by mouth daily   Yes Historical Provider, MD   amLODIPine (NORVASC) 5 MG tablet Take 1 tablet by mouth daily 20  Yes Monica Chapman MD   metFORMIN (GLUCOPHAGE) 500 MG tablet Take 1 tablet by mouth 2 times daily (with meals) 20  Yes Monica Chapman MD   pioglitazone (ACTOS) 30 MG tablet Take 1 tablet by mouth daily 20  Yes Monica Chapman MD   Insulin Pen Needle 32G X 4 MM MISC 1 each by Does not apply route daily 20   Monica Chapman MD   aspirin 81 MG tablet Take 81 mg by mouth daily    Historical Provider, MD [place] Oil 350 MG CAPS Take 500 mg by mouth     Historical Provider, MD       Current medications:    Current Facility-Administered Medications   Medication Dose Route Frequency Provider Last Rate Last Admin    lactated ringers infusion   Intravenous Continuous Handy Fagn  mL/hr at 12/15/20 1103 New Bag at 12/15/20 1103    sodium chloride flush 0.9 % injection 10 mL  10 mL Intravenous 2 times per day Handy Fang MD        sodium chloride flush 0.9 % injection 10 mL  10 mL Intravenous PRN Handy Fang MD        lidocaine PF 1 % injection 1 mL  1 mL Intradermal Once PRN [address] Karen Quezada MD        ceFAZolin (ANCEF) 2 g in dextrose 5 % 50 mL IVPB  2 g Intravenous Once Doree Pardeep,            Allergies:     Allergies   Allergen Reactions    Augmentin [Amoxicillin-Pot Clavulanate] Nausea And Vomiting    Bactrim [Sulfamethoxazole-Trimethoprim] Nausea And Vomiting    Vancomycin Rash    Dolobid [Diflunisal] Nausea And Vomiting       Problem List:    Patient Active Problem List   Diagnosis Code    Essential hypertension I10    Muscle cramps R25.2    Type 2 diabetes mellitus without complication (HCC) N51.5    Elevated lipoprotein(a) E78.41    Severe obesity (BMI >= 40) (HCC) E66.01    Ductal carcinoma in situ (DCIS) of left breast D05.12       Past Medical History:        Diagnosis Date    Breast cancer ([address]     left breast    Diabetes mellitus ([address]     Emphysema (subcutaneous) (surgical) resulting from a procedure     Hx of blood clots     left leg    Hypertension     Kidney calculi     passed on her own    Neuropathy     DIONI on CPAP     uses nightly    Prolonged emergence from general anesthesia     Sleep apnea        Past Surgical History:        Procedure Laterality Date    CHOLECYSTECTOMY      COLONOSCOPY      removed polyps    COLONOSCOPY  10/26/2020    with Biopsy    COLONOSCOPY N/A 10/26/2020    COLONOSCOPY POLYPECTOMY  BIOPSY performed by Yuniel Ross MD at [address], COLON, DIAGNOSTIC      KNEE ARTHROSCOPY Left     HARI STEROTACTIC LOC BREAST BIOPSY LEFT  11/10/2020    HARI STEROTACTIC LOC BREAST BIOPSY LEFT 11/10/2020 [facility]    OTHER SURGICAL HISTORY      DNC for post menapausal bleeding    TONSILLECTOMY      US THYROID BIOPSY         Social History:    Social History     Tobacco Use    Smoking status: Former Smoker     Packs/day: 2.00     Years: 44.00     Pack years: 88.00     Quit date: 2019     Years since quittin.5    Smokeless tobacco: Never Used   Substance Use Topics    Alcohol use: Not Currently Counseling given: Not Answered      Vital Signs (Current):   Vitals:    12/15/20 1040   Weight: 251 lb (113.9 kg)                                              BP Readings from Last 3 Encounters:   12/15/20 127/77   12/07/20 (!) 141/74   11/30/20 132/76       NPO Status: Time of last liquid consumption: 2330                        Time of last solid consumption: 1800                        Date of last liquid consumption: 12/14/20                        Date of last solid food consumption: 12/14/20    BMI:   Wt Readings from Last 3 Encounters:   12/15/20 251 lb (113.9 kg)   12/07/20 251 lb (113.9 kg)   11/30/20 248 lb 3.2 oz (112.6 kg)     Body mass index is 40.51 kg/m².     CBC:   Lab Results   Component Value Date    WBC 7.3 11/30/2020    RBC 4.59 11/30/2020    HGB 14.0 11/30/2020    HCT 43.5 11/30/2020    MCV 94.8 11/30/2020    RDW 12.9 11/30/2020     11/30/2020       CMP:   Lab Results   Component Value Date     11/30/2020    K 4.5 11/30/2020     11/30/2020    CO2 22 11/30/2020    BUN 16 11/30/2020    CREATININE 0.63 11/30/2020    GFRAA >60 11/30/2020    LABGLOM >60 11/30/2020    PROT 6.9 11/30/2020    CALCIUM 9.2 11/30/2020    BILITOT 0.61 11/30/2020    ALKPHOS 77 11/30/2020    AST 18 11/30/2020    ALT 17 11/30/2020       POC Tests:   Recent Labs     12/15/20  1100   POCGLU 142*       Coags:   Lab Results   Component Value Date    PROTIME 10.1 11/30/2020    INR 1.0 11/30/2020       HCG (If Applicable): No results found for: PREGTESTUR, PREGSERUM, HCG, HCGQUANT     ABGs: No results found for: PHART, PO2ART, FSR8TQZ, LZJ2FQT, BEART, C3JCLNWR     Type & Screen (If Applicable):  No results found for: LABABO, LABRH    Drug/Infectious Status (If Applicable):  No results found for: HIV, HEPCAB    COVID-19 Screening (If Applicable):   Lab Results   Component Value Date    COVID19 Not Detected 12/11/2020    COVID19 Not Detected 10/22/2020         Anesthesia Evaluation    Airway:

## 2020-12-15 NOTE — ANESTHESIA POSTPROCEDURE EVALUATION
Department of Anesthesiology  Postprocedure Note    Patient: Drake Em  MRN: [id number]  YOB: 1955  Date of evaluation: 12/15/2020  Time:  3:21 PM     Procedure Summary     Date: 12/15/20 Room / Location: [place] [address] / [place] [address]    Anesthesia Start: 1105 Anesthesia Stop: 3200    Procedure: LEFT BREAST LUMPECTOMY WITH   NEEDLE LOCALIZATION (11:30) (Left Breast) Diagnosis: (LEFT BREAST DCIS)    Surgeons: Jay Ghosh DO Responsible Provider: Debbie Puri MD    Anesthesia Type: general ASA Status: 3          Anesthesia Type: general    Ronak Phase I:      Ronak Phase II:      Last vitals: Reviewed and per EMR flowsheets.        Anesthesia Post Evaluation    Complications: no

## 2020-12-15 NOTE — BRIEF OP NOTE
Brief Postoperative Note      Patient: Adelita Loaiza  YOB: 1955  MRN: [id number]    Date of Procedure: 12/15/2020    Pre-Op Diagnosis: LEFT BREAST DCIS    Post-Op Diagnosis: Same       Procedure(s):  LEFT BREAST LUMPECTOMY WITH   NEEDLE LOCALIZATION (11:30)    Surgeon(s):  Rod Fenton DO    Assistant:  Resident: Kalin Potter DO    Anesthesia: General    Estimated Blood Loss (mL): 5cc    IVF: 900cc crystalloid    WC: I    Complications: None    Specimens:   ID Type Source Tests Collected by Time Destination   A : left breast mass, long black suture lateral margin and short double black suture is superior margin  Tissue Breast [address], DO 12/15/2020 1339    B : additional inferior posterior margin, double short black suture is new inferior margin and long single black suture is new posterior margin, long blue suture is lateral margin Tissue Breast [address], DO 12/15/2020 1345    C : additional superior anterior margin, two short black suture is new superior margin, long black suture is new anterior margine, blue suture is latera Tissue Breast [address], DO 12/15/2020 1348    D : ADDITIONAL LATERAL TISSUE Tissue Breast [address], DO 12/15/2020 1357    E : ADDITIONAL INFERIOR POSTERIOR MARGIN, DOUBLE SHORT BLACK SUTURE IS NEW INFERIOR MARGIN AND LONG SINGLE BLACK SUTURE IS NEW POSTERIOR MARGIN. LONG BLUE SUTURE IS LATERAL MARGIN Tissue Breast SURGICAL PATHOLOGY Rod Fenton DO 12/15/2020 1416        Implants:  * No implants in log *      Drains: * No LDAs found *    Findings: Wire taken. No clip.  Additional margins taken medial and inferior    Electronically signed by Kalin Potter DO on 12/15/2020 at 3:19 PM

## 2020-12-15 NOTE — OP NOTE
Operative Note    Patient: Cristin Burns  YOB: 1955  MRN: [id number]     Date of Procedure: 12/15/2020     Pre-Op Diagnosis: LEFT BREAST DCIS     Post-Op Diagnosis: Same       Procedure(s):  LEFT BREAST LUMPECTOMY WITH   NEEDLE LOCALIZATION (11:30)     Surgeon(s):  Sona Krishna DO     Assistant:  Resident: Leslie Kennedy DO     Anesthesia: General     Estimated Blood Loss (mL): 5cc     IVF: 900cc crystalloid     WC: I     Complications: None     Specimens:   ID Type Source Tests Collected by Time Destination   A : left breast mass, long black suture lateral margin and short double black suture is superior margin  Tissue Breast [address], DO 12/15/2020 1339     B : additional inferior posterior margin, double short black suture is new inferior margin and long single black suture is new posterior margin, long blue suture is lateral margin Tissue Breast [address], DO 12/15/2020 1345     C : additional superior anterior margin, two short black suture is new superior margin, long black suture is new anterior margine, blue suture is latera Tissue Breast SURGICAL PATHOLOGY Sona Krishna, DO 12/15/2020 1348     D : ADDITIONAL LATERAL TISSUE Tissue Breast [address], DO 12/15/2020 1357     E : ADDITIONAL INFERIOR POSTERIOR MARGIN, DOUBLE SHORT BLACK SUTURE IS NEW INFERIOR MARGIN AND LONG SINGLE BLACK SUTURE IS NEW POSTERIOR MARGIN. LONG BLUE SUTURE IS LATERAL MARGIN Tissue Breast SURGICAL PATHOLOGY Sona Erendira, DO 12/15/2020 1416           Implants:  * No implants in log *      Drains: * No LDAs found *     Findings: Wire taken. No clip. Additional margins taken medial and inferior    Teresita Coleman is a 63year-old female who was diagnosed with DCIS of the left breast. It was recommended she undergo left breast needle localized lumpectomy. The risk, benefits and alternatives were discussed with her.  All questions answered and informed consent signed. Patient was seen back to the operating room transferred to the operating room table in the supine position. She underwent general anesthesia per anesthesia guidelines. A timeout was called to find correct patient, position and procedure be performed and verified by all present. Perioperative antibiotics were administered according to SCIP guidelines. She was then prepped and draped in usual sterile fashion. Needle localization studies were reviewed and incision line was marked out according to localization studies. A curvilinear incision was made on the inferior aspect of the breast incorporating the wire on the medial aspect of the incision. Dissection was carried down through the deep dermis with sharp dissection with a 15 blade scalpel. The wire was included within the incision line. Dissection carried further down with Bovie electrocautery. Shortly within the subcutaneous tissue we started expanding superiorly and inferiorly to incorporate adequate margins. The wire was followed towards the 6 o'clock position. The wire was identified and we went further posterior to incorporate this tissue. Once the entire wire was completely enucleated the specimen was marked with a 3-0 silk suture in which the long single stitch marked the lateral margin the double short stitch marked the superior margin and the wire entered from the medial margin. Due to the end of the wire being seen we took additional margin along the inferior posterior aspect. This was done sharply with Metzenbaum scissors. This was again marked with double short black silk suture as the new inferior margin. 1 additional margin was taken was along the anterior superior margin. Double short black stitch was superior margin and single long black stitch is the anterior margin. For specimen was then placed on the radiological port and sent for radiological evaluation.   The 2 additional

## 2020-12-17 LAB — SURGICAL PATHOLOGY REPORT: NORMAL

## 2020-12-21 RX ORDER — INSULIN DEGLUDEC 200 U/ML
INJECTION, SOLUTION SUBCUTANEOUS
Qty: 1 PEN | Refills: 3 | Status: SHIPPED | OUTPATIENT
Start: 2020-12-21 | End: 2020-12-22 | Stop reason: SDUPTHER

## 2020-12-21 RX ORDER — PIOGLITAZONEHYDROCHLORIDE 30 MG/1
TABLET ORAL
Qty: 90 TABLET | Refills: 1 | Status: SHIPPED | OUTPATIENT
Start: 2020-12-21 | End: 2021-04-14 | Stop reason: ALTCHOICE

## 2020-12-22 ENCOUNTER — TELEPHONE (OUTPATIENT)
Dept: FAMILY MEDICINE CLINIC | Age: 65
End: 2020-12-22

## 2020-12-22 RX ORDER — INSULIN DEGLUDEC 200 U/ML
INJECTION, SOLUTION SUBCUTANEOUS
Qty: 2 PEN | Refills: 2 | Status: SHIPPED | OUTPATIENT
Start: 2020-12-22 | End: 2021-06-20

## 2020-12-22 NOTE — TELEPHONE ENCOUNTER
Patient states that the pharmacy informed her that she needs a new script for Lita Cart for the 26 units that was increase instead of the 15 units she use to take. Please advise Thanks. Next Visit Date:  Future Appointments   Date Time Provider Don Tolbert   1/4/2021  2:45 PM Erika Ríos DO [facility] None   1/5/2021 11:00 AM Kelsey Michelle MD [facility] [place]   1/21/2021 11:15 AM Remi Farah MD [facility]   Topic Date Due    Hepatitis C screen  1955    HIV screen  08/17/1972    Shingles Vaccine (1 of 2) 08/20/2021 (Originally 8/17/2007)    Lipid screen  02/18/2021    A1C test (Diabetic or Prediabetic)  02/28/2021    Diabetic foot exam  07/06/2021    Diabetic retinal exam  08/21/2021    Low dose CT lung screening  10/28/2021    Diabetic microalbuminuria test  11/30/2021    Potassium monitoring  11/30/2021    Creatinine monitoring  11/30/2021    Breast cancer screen  12/15/2021    Colon cancer screen colonoscopy  10/26/2023    Cervical cancer screen  07/06/2025    DTaP/Tdap/Td vaccine (2 - Td) 08/20/2030    Flu vaccine  Completed    Pneumococcal 0-64 years Vaccine  Completed    Hepatitis A vaccine  Aged Out    Hib vaccine  Aged Out    Meningococcal (ACWY) vaccine  Aged Out       Hemoglobin A1C (%)   Date Value   11/30/2020 12.9   07/06/2020 14.0   02/18/2020 12.3 (H)             ( goal A1C is < 7)   Microalb/Crt.  Ratio (mcg/mg creat)   Date Value   11/30/2020 CANNOT BE CALCULATED     LDL Cholesterol (mg/dL)   Date Value   02/18/2020 140 (H)       (goal LDL is <100)   AST (U/L)   Date Value   11/30/2020 18     ALT (U/L)   Date Value   11/30/2020 17     BUN (mg/dL)   Date Value   11/30/2020 16     BP Readings from Last 3 Encounters:   12/15/20 127/77   12/15/20 125/70   12/15/20 132/76          (goal 120/80)    All Future Testing planned in [facility]  Lab Frequency Next Occurrence   Creatinine, Random Urine Once 09/19/2020   Hepatitis C Antibody Once 08/20/2020   HIV Screen Once 08/20/2020   Lipid, Fasting Once 08/20/2020   COVID-19 Once 12/09/2020               Patient Active Problem List:     Essential hypertension     Muscle cramps     Type 2 diabetes mellitus without complication (HCC)     Elevated lipoprotein(a)     Severe obesity (BMI >= 40) (HCC)     Ductal carcinoma in situ (DCIS) of left breast

## 2021-01-05 ENCOUNTER — HOSPITAL ENCOUNTER (OUTPATIENT)
Dept: RADIATION ONCOLOGY | Age: 66
Discharge: HOME | End: 2021-01-05
Payer: COMMERCIAL

## 2021-01-05 VITALS
SYSTOLIC BLOOD PRESSURE: 135 MMHG | BODY MASS INDEX: 42.23 KG/M2 | TEMPERATURE: 97.6 F | HEART RATE: 73 BPM | OXYGEN SATURATION: 97 % | DIASTOLIC BLOOD PRESSURE: 71 MMHG | RESPIRATION RATE: 18 BRPM | HEIGHT: 66 IN | WEIGHT: 262.8 LBS

## 2021-01-05 DIAGNOSIS — D05.12 DUCTAL CARCINOMA IN SITU (DCIS) OF LEFT BREAST: Primary | ICD-10-CM

## 2021-01-05 PROCEDURE — 99213 OFFICE O/P EST LOW 20 MIN: CPT | Performed by: RADIOLOGY

## 2021-01-05 PROCEDURE — 99204 OFFICE O/P NEW MOD 45 MIN: CPT | Performed by: RADIOLOGY

## 2021-01-05 ASSESSMENT — PAIN DESCRIPTION - DESCRIPTORS: DESCRIPTORS: DISCOMFORT;TENDER

## 2021-01-05 ASSESSMENT — PAIN DESCRIPTION - LOCATION: LOCATION: BREAST

## 2021-01-05 ASSESSMENT — PAIN DESCRIPTION - ORIENTATION: ORIENTATION: LEFT

## 2021-01-05 NOTE — CONSULTS
[address]            Radiation Oncology          [address] [facility], [facility] [address]        Chantell Back: [phone]        F: [phone]       [employer]           2021    Patient: Nikita Gillis   YOB: 1955       Dear Dr Ester To: Thank you for referring Nikita Gillis to me for evaluation. Below are the relevant portions of my assessment and plan of care. If you have questions, please do not hesitate to call me. I look forward to following this patient along with you. Sincerely,    Electronically signed by Jocelyn Mitchell MD on 21 at 6:21 PM EST    CC: Patient Care Team:  Reji Ramírez MD as PCP - General (Family Medicine)  Reji Ramírez MD as PCP - [facility]  Radha Narayanan RN as Imaging Navigator  Kimberly Larose RN as Nurse Navigator (Oncology)  ------------------------------------------------------------------------------------------------------------------------------------------------------------------------------------------      RADIATION ONCOLOGY NEW PATIENT VISIT:    Date of Service: 2021    Location:  [facility] Radiation Oncology,   [address] [place] [address], [facility]   [phone]     Patient ID:   Nikita Gillis  : 1955   MRN: [id number]    CHIEF COMPLAINT: \"Left breast DCIS\"    DIAGNOSIS:  Cancer Staging  Ductal carcinoma in situ (DCIS) of left breast  Staging form: Breast, AJCC 8th Edition  - Pathologic stage from 11/10/2020: Stage 0 (pTis (DCIS), pN0, cM0, G2, ER+, NE+, HER2: Not Assessed) - Signed by Jocelyn Mitchell MD on 2021  -s/p Lump 12/15/20    HISTORY OF PRESENT ILLNESS:   Ms Sanchez Pardo is a 68-year-old female who was undergoing routine surveillance mammograms and was noted surveillance mammogram on 2020 to have calcifications in the left breast 6 o'clock position.   Patient was recommended to undergo a plastic mammogram and ultrasound left breast and was confirmed to have pleomorphic calcifications. Patient was therefore recommended to undergo a biopsy which was performed on November 10, 2020 confirming a ductal carcinoma in situ of intermediate grade ER/AK positive. Patient was therefore referred to Dr Tata Rudd and surgery and elected to have breast conservation surgery. Patient had a lumpectomy in December 15, 2020 which fortunately did not note any evidence of any residual DCIS in the specimen. However patient did have a bit of a mixup which she was notified through her MyChart of a different pathology report of another patient that did show invasive cancer. We reviewed with her that though her pathology report now has no evidence of invasive or in situ disease, there was a mention that at the M clip that was placed at the biopsy was not found in the specimen. Patient did meet with medical oncology as well to discuss the role of adjuvant endocrine therapy given her estrogen receptor status. Patient is here today to discuss adjuvant radiation therapy. She otherwise is nervous about treatments but denies any acute symptoms of breast pain, soreness, swelling, erythema, or drainage. She denies any headache, dizziness, chest pain, abdominal pain, nausea, weight loss, fatigue, bony pain, or any bleeding.       OBGYN HISTORY: (Breast/GYN only)    menarche [age] menopause 48  Oral contraceptive use: NA  Hormone replacement therapy use: NA         PRIOR RADIATION HISTORY:  No prior history of radiation therapy     PACEMAKER: None     PAST MEDICAL HISTORY:  Past Medical History:   Diagnosis Date    Breast cancer ([address]     left breast    Diabetes mellitus ([address]     Emphysema (subcutaneous) (surgical) resulting from a procedure     Hx of blood clots     left leg    Hypertension     Kidney calculi     passed on her own    Neuropathy     DIONI on CPAP     uses nightly    Prolonged emergence from general anesthesia     Sleep apnea        PAST SURGICAL HISTORY:  Past Surgical History:   Procedure Laterality Date    BREAST BIOPSY Left 12/15/2020    LEFT BREAST LUMPECTOMY WITH   NEEDLE LOCALIZATION (11:30) performed by Pooja Camacho DO at [address]      removed polyps    COLONOSCOPY  10/26/2020    with Biopsy    COLONOSCOPY N/A 10/26/2020    COLONOSCOPY POLYPECTOMY  BIOPSY performed by Rafael Méndez MD at [address] [place] [address], COLON, DIAGNOSTIC      KNEE ARTHROSCOPY Left 1995    HARI STEROTACTIC LOC BREAST BIOPSY LEFT  11/10/2020    HARI STEROTACTIC LOC BREAST BIOPSY LEFT 11/10/2020 [facility]    OTHER SURGICAL HISTORY      DNC for post menapausal bleeding    TONSILLECTOMY      US THYROID BIOPSY         MEDICATIONS:    Current Outpatient Medications:     Insulin Degludec (TRESIBA FLEXTOUCH) 200 UNIT/ML SOPN, INJECT 26 UNITS INTO THE SKIN DAILY, Disp: 2 pen, Rfl: 2    metFORMIN (GLUCOPHAGE) 500 MG tablet, TAKE 1 TABLET BY MOUTH TWICE A DAY WITH MEALS, Disp: 180 tablet, Rfl: 1    pioglitazone (ACTOS) 30 MG tablet, TAKE 1 TABLET BY MOUTH EVERY DAY, Disp: 90 tablet, Rfl: 1    docusate sodium (COLACE) 100 MG capsule, Take 1 capsule by mouth 2 times daily as needed for Constipation, Disp: 60 capsule, Rfl: 0    Insulin Pen Needle 32G X 4 MM MISC, 1 each by Does not apply route daily, Disp: 100 each, Rfl: 3    atenolol (TENORMIN) 50 MG tablet, TAKE 1 TABLET BY MOUTH EVERY DAY, Disp: 90 tablet, Rfl: 1    pantoprazole (PROTONIX) 40 MG tablet, TAKE 1 TABLET BY MOUTH EVERY DAY, Disp: 90 tablet, Rfl: 1    Multiple Vitamin (MULTI-VITAMIN DAILY PO), Take by mouth daily, Disp: , Rfl:     amLODIPine (NORVASC) 5 MG tablet, Take 1 tablet by mouth daily, Disp: 90 tablet, Rfl: 1    aspirin 81 MG tablet, Take 81 mg by mouth daily, Disp: , Rfl:     Krill Oil 350 MG CAPS, Take 500 mg by mouth , Disp: , Rfl:     ALLERGIES:   Allergies   Allergen Reactions    Augmentin [Amoxicillin-Pot Clavulanate] Nausea And Vomiting    Bactrim [Sulfamethoxazole-Trimethoprim] Nausea And Vomiting    Vancomycin Rash    Dolobid [Diflunisal] Nausea And Vomiting       SOCIAL HISTORY:  Social History     Socioeconomic History    Marital status: Single     Spouse name: None    Number of children: None    Years of education: None    Highest education level: None   Occupational History    None   Social Needs    Financial resource strain: Not hard at all   [place] insecurity     Worry: Never true     Inability: Never true    Transportation needs     Medical: No     Non-medical: No   Tobacco Use    Smoking status: Former Smoker     Packs/day: 2.00     Years: 44.00     Pack years: 88.00     Quit date: 2019     Years since quittin.5    Smokeless tobacco: Never Used   Substance and Sexual Activity    Alcohol use: Not Currently    Drug use: Never    Sexual activity: None   Lifestyle    Physical activity     Days per week: None     Minutes per session: None    Stress: None   Relationships    Social connections     Talks on phone: None     Gets together: None     Attends [employer] service: None     Active member of club or organization: None     Attends meetings of clubs or organizations: None     Relationship status: None    Intimate partner violence     Fear of current or ex partner: None     Emotionally abused: None     Physically abused: None     Forced sexual activity: None   Other Topics Concern    None   Social History Narrative    None       FAMILY HISTORY:  Family History   Problem Relation Age of Onset    Breast Cancer Mother     Heart Defect Mother         Murmur     Alzheimer's Disease Mother     Diabetes type 2  Mother     Heart Disease Father     Other Father         AA       REVIEW OF SYSTEMS:    GENERAL/CONSTITUTIONAL: The patient denies fever, fatigue, weakness, weight gain or weight loss. HEENT: The patient denies pain, redness, loss of vision, double or blurred vision.  The patient denies ringing in the ears, loss of hearing, hoarseness, trouble swallowing, or painful swallowing. CARDIOVASCULAR: The patient denies chest pain, irregular heartbeats, sudden changes in heartbeat or palpitation. RESPIRATORY: The patient denies shortness of breath, cough, hemoptysis. GASTROINTESTINAL: The patient denies nausea, vomiting, diarrhea, constipation, blood in the stools. GENITOURINARY: The patient denies difficult urination, pain or burning with urination, blood in the urine. MUSCULOSKELETAL: The patient denies arm, buttock, thigh or calf cramps. No joint or muscle pain. SKIN: The patient denies easy bruising, skin redness, skin rash, hives. NEUROLOGIC: The patient denies headache, dizziness, fainting, muscle spasm, loss of consciousness. ENDOCRINE: The patient denies intolerance to hot or cold temperature, flushing. HEMATOLOGIC/LYMPHATIC: The patient denies anemia, bleeding tendency or clotting tendency. ALLERGIC/IMMUNOLOGIC: The patient denies rhinitis, asthma, skin sensitivity. PYSCHOLOGIC: (+) Anxiety. The patient denies any depression,  or confusion. A full 14 point review of systems was performed and assessed and found to be negative except as noted above. PHYSICAL EXAMINATION:    CHAPERONE: Not Required    ECO Asymptomatic    VITAL SIGNS: /71   Pulse 73   Temp 97.6 °F (36.4 °C) (Oral)   Resp 18   Ht 5' 6\" (1.676 m)   Wt 262 lb 12.8 oz (119.2 kg)   SpO2 97%   BMI 42.42 kg/m²   GENERAL:  General appearance is that of a well-nourished, well-developed in no apparent distress. HEENT: Normocephalic, atraumatic, EOMI, moist mucosa, no erythema. NECK:  No adenopathy or a palpable thyroid mass, trachea is midline. LYMPHATICS: No cervical, supraclavicular adenopathy. HEART:  Regular rate and rhythm, S1, S2, no murmurs. LUNGS:  Clear to auscultation bilaterally with no wheezing or crackles. ABDOMEN:  Soft, nontender, non distended. EXTREMITIES:  No clubbing, cyanosis, or edema.   No calf NEOPLASM IDENTIFIED (MARGINS NEGATIVE). 3.  BREAST TISSUES, EXCISION, WITH EXAMINATION OF MARGIN (ADDITIONAL   LATERAL):        -FATTY TISSUES WITHOUT NEOPLASM. 4.  BREAST TISSUES, EXCISION, WITH EXAMINATION OF MARGINS (ADDITIONAL   INFERIOR POSTERIOR):        -FIBROCYSTIC CHANGE WITH DUCTAL HYPERPLASIA.          -NO IN SITU OR INVASIVE NEOPLASM IDENTIFIED (MARGINS NEGATIVE). 11/10/20 Biopsy  -- Diagnosis --     Left breast, 6:00, biopsy:           Ductal carcinoma in situ, intermediate grade. Microcalcifications present. Estrogen receptor, positive (>95%). Progestin receptor, positive (>95%). ASSESSMENT AND PLAN:   Sagar Boyd is a 63 y.o. female with a Cancer Staging  Ductal carcinoma in situ (DCIS) of left breast  Staging form: Breast, AJCC 8th Edition  - Pathologic stage from 11/10/2020: Stage 0 (pTis (DCIS), pN0, cM0, G2, ER+, MS+, HER2: Not Assessed) - Signed by Uma Adams MD on 1/5/2021  -s/p Lump 12/15/20     We reviewed with the patient the testing that has been done so far, including imaging and pathology results. We also reviewed their staging based on the testing that as been done and the recommendations per the NCCN guidelines. We discussed the options of treatment, including surgery, chemotherapy, and radiation, and the rationale of why radiation therapy would be indicated for this diagnosis. We also discussed that they have the option to not pursue our recommended treatment as well. I reviewed with the patient that her pathology at the time of lumpectomy did not show any evidence of in situ or invasive carcinoma however there was a mention that the M clip from the time of biopsy was not found in her specimen. We did discuss that this may potentially mean that it was lost or not noted by this neurologist at the time of evaluation. There is also a chance that potentially the tumor area that was marked was not excised at the time of lumpectomy.   To further delineate this we will recommend patient undergo a repeat diagnostic mammogram of the left breast to evaluate for any potential residual calcifications or for the M clip. We did discuss that afterwards patient would still likely need adjuvant radiation therapy given her breast conservation surgery as well as endocrine therapy. We reviewed with her the [facility] of benefits with radiation and endocrine therapy for women with DCIS. We reviewed the logistics of treatment planning, including a CT Simulation session, as well as daily treatments for approximately 3 weeks. With regards to radiation to the left breast, I discussed the possible short-term side effects of fatigue, skin irritation (causing redness, dryness, or peeling), swelling and tenderness of the left breast, tiredness, low blood counts (causing infection or bleeding) and hair loss in treated area. Possible long-term side effects discussed included change in the cosmetic appearance of the left breast (change in shape, size, and texture of the breast), hyper/hypo-pigmentation of the skin, limited range of motion of the left shoulder, scarring of the lung (causing shortness of breath and cough), damage to the heart, swelling of the left arm i.e. lymphedema (causing pain), damage to the nerves (causing numbness, weakness, or paralysis) and rib fracture. We discussed the use of deep inspirative breath holding techniques during planning and treatment to help reduce the risk of toxicities to the heart. After ample time to review the patient's questions, patient will be referred for a diagnostic mammogram of the breast to evaluate for any residual calcifications or the biopsy clip. Patient will then follow-up with us to discuss further planning for treatments based on that. Patient was in agreement with my recommendations. All questions were answered to their satisfaction.  Patient was advised to contact us anytime should they have any questions or concerns. I spent greater than 60 minutes counseling and evaluating the different treatment options available to the patient and formulating a plan of action today.       Chilango Clay MD  Electronically signed by Chilango Clay MD on 1/5/21 at 6:21 PM EST      Drugs Prescribed:  New Prescriptions    No medications on file       Orders Placed:     Orders Placed This Encounter   Procedures    HARI MELISSA DIGITAL DIAGNOSTIC UNILATERAL LEFT         CC:  Patient Care Team:  Sirisha Aparicio MD as PCP - General (Family Medicine)  Sirisha Aparicio MD as PCP - [facility] Empaneled Provider  Carissa Albarran RN as Imaging Navigator  Melanie Zamora RN as Nurse Navigator (Oncology)

## 2021-01-05 NOTE — PROGRESS NOTES
Referring Physician: Dr. Damien De La Cruz:    21 1103   BP: 135/71   Pulse: 73   Resp: 18   Temp: 97.6 °F (36.4 °C)   SpO2: 97%    :  Patient Currently in Pain: Yes  Pain Assessment: 0-10          Wt Readings from Last 1 Encounters:   21 262 lb 12.8 oz (119.2 kg)        Body mass index is 42.42 kg/m². Height: 5' 6\" (167.6 cm)         Immunizations:    Influenza status:    [x]   Current   []   Patient declined    Pneumococcal status:  [x]   Current  []   Patient declined    Smoking Status:    [] Smoker - PPD:   [x] Nonsmoker - Quit Date:               [] Never a smoker      No chief complaint on file. Cancer Staging  No matching staging information was found for the patient. Prior Radiation Therapy? Yes   If yes, site treated:   Facility:                             Date:    Concurrent Chemo/radiation? Yes   If yes, start date:    Prior Chemotherapy? Yes   If yes    Facility:                             Date:    Prior Hormonal Therapy? No   If yes   Facility:                             Date:    Head and Neck Cancer? No   If yes, please remind physician to place swallow study order and speech therapy order.       Pacemaker/Defibulator/ICD:  No             BREAST/GYN  Patient only:     LMP: 2009     Age at first Menses: 8 or 14    Para: 0    : 0    Cup size:  C    Lymphedema Evaluation[de-identified]   [x] left arm      [] right arm  Location:     Measurement (cm)    Upper Bicep :  56cm up and 37.5 around   Lower Bicep :  46 cm up and 30.5cm around          Current Outpatient Medications   Medication Sig Dispense Refill    Insulin Degludec (TRESIBA FLEXTOUCH) 200 UNIT/ML SOPN INJECT 26 UNITS INTO THE SKIN DAILY 2 pen 2    metFORMIN (GLUCOPHAGE) 500 MG tablet TAKE 1 TABLET BY MOUTH TWICE A DAY WITH MEALS 180 tablet 1    pioglitazone (ACTOS) 30 MG tablet TAKE 1 TABLET BY MOUTH EVERY DAY 90 tablet 1    docusate sodium (COLACE) 100 MG capsule Take 1 capsule by mouth 2 times daily as needed for Constipation 60 capsule 0    Insulin Pen Needle 32G X 4 MM MISC 1 each by Does not apply route daily 100 each 3    atenolol (TENORMIN) 50 MG tablet TAKE 1 TABLET BY MOUTH EVERY DAY 90 tablet 1    pantoprazole (PROTONIX) 40 MG tablet TAKE 1 TABLET BY MOUTH EVERY DAY 90 tablet 1    Multiple Vitamin (MULTI-VITAMIN DAILY PO) Take by mouth daily      amLODIPine (NORVASC) 5 MG tablet Take 1 tablet by mouth daily 90 tablet 1    aspirin 81 MG tablet Take 81 mg by mouth daily      Krill Oil 350 MG CAPS Take 500 mg by mouth        No current facility-administered medications for this encounter.         Past Medical History:   Diagnosis Date    Breast cancer ([address]     left breast    Diabetes mellitus ([address]     Emphysema (subcutaneous) (surgical) resulting from a procedure     Hx of blood clots     left leg    Hypertension     Kidney calculi     passed on her own    Neuropathy     DIONI on CPAP     uses nightly    Prolonged emergence from general anesthesia     Sleep apnea        Past Surgical History:   Procedure Laterality Date    BREAST BIOPSY Left 12/15/2020    LEFT BREAST LUMPECTOMY WITH   NEEDLE LOCALIZATION (11:30) performed by Kenia Gao DO at [address] [place] [address]       removed polyps    COLONOSCOPY  10/26/2020    with Biopsy    COLONOSCOPY N/A 10/26/2020    COLONOSCOPY POLYPECTOMY  BIOPSY performed by Román Melo MD at [facility], COLON, DIAGNOSTIC      KNEE ARTHROSCOPY Left 1995    HARI STEROTACTIC LOC BREAST BIOPSY LEFT  11/10/2020    HARI STEROTACTIC LOC BREAST BIOPSY LEFT 11/10/2020 [facility]    OTHER SURGICAL HISTORY      DNC for post menapausal bleeding    TONSILLECTOMY      US THYROID BIOPSY         Family History   Problem Relation Age of Onset    Breast Cancer Mother     Heart Defect Mother         Murmur     Alzheimer's Disease Mother     Diabetes type 2  Mother     Heart Disease Father     Other Father         AA Social History     Socioeconomic History    Marital status: Single     Spouse name: Not on file    Number of children: Not on file    Years of education: Not on file    Highest education level: Not on file   Occupational History    Not on file   Social Needs    Financial resource strain: Not hard at all    Food insecurity     Worry: Never true     Inability: Never true   [employer] needs     Medical: No     Non-medical: No   Tobacco Use    Smoking status: Former Smoker     Packs/day: 2.00     Years: 44.00     Pack years: 88.00     Quit date: 2019     Years since quittin.5    Smokeless tobacco: Never Used   Substance and Sexual Activity    Alcohol use: Not Currently    Drug use: Never    Sexual activity: Not on file   Lifestyle    Physical activity     Days per week: Not on file     Minutes per session: Not on file    Stress: Not on file   Relationships    Social connections     Talks on phone: Not on file     Gets together: Not on file     Attends [employer] service: Not on file     Active member of club or organization: Not on file     Attends meetings of clubs or organizations: Not on file     Relationship status: Not on file    Intimate partner violence     Fear of current or ex partner: Not on file     Emotionally abused: Not on file     Physically abused: Not on file     Forced sexual activity: Not on file   Other Topics Concern    Not on file   Social History Narrative    Not on file             FALLS RISK SCREEN  Instructions:  Assess the patient and enter the appropriate indicators that are present for fall risk identification. Total the numbers entered and assign a fall risk score from Table 2.  Reassess patient at a minimum every 12 weeks or with status change. Assessment   Date  2021     1. Mental Ability: confusion/cognitively impaired 0     2. Elimination Issues: incontinence, frequency 0       3.   Ambulatory: use of assistive devices (walker, cane, and examined pt. Per MD pt will need a repeat Mammogram and will follow up after.          Florecita Frederick 1/5/2021 11:06 AM

## 2021-01-13 ENCOUNTER — HOSPITAL ENCOUNTER (OUTPATIENT)
Dept: ULTRASOUND IMAGING | Age: 66
Discharge: HOME | End: 2021-01-15
Payer: COMMERCIAL

## 2021-01-13 ENCOUNTER — HOSPITAL ENCOUNTER (OUTPATIENT)
Dept: MAMMOGRAPHY | Age: 66
Discharge: HOME | End: 2021-01-15
Payer: COMMERCIAL

## 2021-01-13 DIAGNOSIS — D05.12 DUCTAL CARCINOMA IN SITU (DCIS) OF LEFT BREAST: ICD-10-CM

## 2021-01-13 DIAGNOSIS — R92.8 ABNORMAL MAMMOGRAM: ICD-10-CM

## 2021-01-13 PROCEDURE — 76642 ULTRASOUND BREAST LIMITED: CPT

## 2021-01-13 PROCEDURE — G0279 TOMOSYNTHESIS, MAMMO: HCPCS

## 2021-01-18 ENCOUNTER — HOSPITAL ENCOUNTER (OUTPATIENT)
Dept: RADIATION ONCOLOGY | Age: 66
Discharge: HOME | End: 2021-01-18
Payer: COMMERCIAL

## 2021-01-18 VITALS
HEART RATE: 91 BPM | RESPIRATION RATE: 14 BRPM | SYSTOLIC BLOOD PRESSURE: 141 MMHG | BODY MASS INDEX: 42.29 KG/M2 | OXYGEN SATURATION: 97 % | WEIGHT: 262 LBS | DIASTOLIC BLOOD PRESSURE: 83 MMHG | TEMPERATURE: 97 F

## 2021-01-18 PROCEDURE — 99211 OFF/OP EST MAY X REQ PHY/QHP: CPT | Performed by: RADIOLOGY

## 2021-01-18 PROCEDURE — 99213 OFFICE O/P EST LOW 20 MIN: CPT | Performed by: RADIOLOGY

## 2021-01-18 ASSESSMENT — PAIN DESCRIPTION - LOCATION: LOCATION: BREAST

## 2021-01-18 NOTE — PROGRESS NOTES
Elizabeth Fus  1/18/2021  1:43 PM      Vitals:    01/18/21 1330   BP: (!) 141/83   Pulse: 91   Resp: 14   Temp: 97 °F (36.1 °C)   SpO2: 97%    :  Patient Currently in Pain: Yes             Wt Readings from Last 1 Encounters:   01/18/21 262 lb (118.8 kg)                Current Outpatient Medications:     Insulin Degludec (TRESIBA FLEXTOUCH) 200 UNIT/ML SOPN, INJECT 26 UNITS INTO THE SKIN DAILY, Disp: 2 pen, Rfl: 2    metFORMIN (GLUCOPHAGE) 500 MG tablet, TAKE 1 TABLET BY MOUTH TWICE A DAY WITH MEALS, Disp: 180 tablet, Rfl: 1    pioglitazone (ACTOS) 30 MG tablet, TAKE 1 TABLET BY MOUTH EVERY DAY, Disp: 90 tablet, Rfl: 1    Insulin Pen Needle 32G X 4 MM MISC, 1 each by Does not apply route daily, Disp: 100 each, Rfl: 3    atenolol (TENORMIN) 50 MG tablet, TAKE 1 TABLET BY MOUTH EVERY DAY, Disp: 90 tablet, Rfl: 1    pantoprazole (PROTONIX) 40 MG tablet, TAKE 1 TABLET BY MOUTH EVERY DAY, Disp: 90 tablet, Rfl: 1    Multiple Vitamin (MULTI-VITAMIN DAILY PO), Take by mouth daily, Disp: , Rfl:     amLODIPine (NORVASC) 5 MG tablet, Take 1 tablet by mouth daily, Disp: 90 tablet, Rfl: 1    aspirin 81 MG tablet, Take 81 mg by mouth daily, Disp: , Rfl:     Krill Oil 350 MG CAPS, Take 500 mg by mouth , Disp: , Rfl:         FALLS RISK SCREEN  Instructions:  Assess the patient and enter the appropriate indicators that are present for fall risk identification. Total the numbers entered and assign a fall risk score from Table 2.  Reassess patient at a minimum every 12 weeks or with status change. Assessment   Date  1/18/2021     1. Mental Ability: confusion/cognitively impaired 0     2. Elimination Issues: incontinence, frequency 0       3. Ambulatory: use of assistive devices (walker, cane, off-loading devices),        attached to equipment (IV pole, oxygen) 0     4. Sensory Limitations: dizziness, vertigo, impaired vision 0     5. Age less than 65        0     6. Age 72 or greater 1     7.   Medication:

## 2021-01-19 NOTE — PROGRESS NOTES
[address] [address]            Radiation Oncology          [address] [facility]          [place] [address]        Colby Jurist: [phone]        F: [phone]       [email or web address]         Date of Service: 2021     Location:  [address],   [address] [facility], [place]   [phone]        RADIATION ONCOLOGY FOLLOW UP NOTE    Patient ID:   Gwolga Collado  : 1955   MRN: [id number]    DIAGNOSIS:  Cancer Staging  Ductal carcinoma in situ (DCIS) of left breast  Staging form: Breast, AJCC 8th Edition  - Pathologic stage from 11/10/2020: Stage 0 (pTis (DCIS), pN0, cM0, G2, ER+, IA+, HER2: Not Assessed) - Signed by Gracie Briggs MD on 2021  -s/p Lump 12/15/20    INTERVAL HISTORY:   Ms. Ruth Rendon is a 47-year-old female who comes in today for short-term follow-up visit after recently seeing us for consultation this month regarding her recently diagnosed left breast ductal carcinoma in situ. Patient at the time of consult was sent for a repeat mammogram of the left breast as she was concerned about her pathology report that showed no further evidence of disease. Patient had mistakenly gotten assigned a different pathology report that showed invasive cancer and so she was concerned. We did reviewed her imaging with her and the biopsy marker was not identified at the time of surgery therefore we recommended repeat mammogram to see if the clip was still present. Patient had repeat mammogram and ultrasound on  which showed no evidence of the biopsy clip nor was there any abnormal findings noted. Patient did have stable calcifications which she is had in the past.  Patient otherwise denies any other acute symptoms today. She denies any new headaches dizziness chest pain shortness of breath abdominal pain nausea weight loss fatigue or bony pain.   She denies any new lumps bumps skin change or nipple discharge in left breast.  Patient is anxious about her diagnoses and understandably apprehensive about treatment. MEDICATIONS:    Current Outpatient Medications:     Insulin Degludec (TRESIBA FLEXTOUCH) 200 UNIT/ML SOPN, INJECT 26 UNITS INTO THE SKIN DAILY, Disp: 2 pen, Rfl: 2    metFORMIN (GLUCOPHAGE) 500 MG tablet, TAKE 1 TABLET BY MOUTH TWICE A DAY WITH MEALS, Disp: 180 tablet, Rfl: 1    pioglitazone (ACTOS) 30 MG tablet, TAKE 1 TABLET BY MOUTH EVERY DAY, Disp: 90 tablet, Rfl: 1    Insulin Pen Needle 32G X 4 MM MISC, 1 each by Does not apply route daily, Disp: 100 each, Rfl: 3    atenolol (TENORMIN) 50 MG tablet, TAKE 1 TABLET BY MOUTH EVERY DAY, Disp: 90 tablet, Rfl: 1    pantoprazole (PROTONIX) 40 MG tablet, TAKE 1 TABLET BY MOUTH EVERY DAY, Disp: 90 tablet, Rfl: 1    Multiple Vitamin (MULTI-VITAMIN DAILY PO), Take by mouth daily, Disp: , Rfl:     amLODIPine (NORVASC) 5 MG tablet, Take 1 tablet by mouth daily, Disp: 90 tablet, Rfl: 1    aspirin 81 MG tablet, Take 81 mg by mouth daily, Disp: , Rfl:     Krill Oil 350 MG CAPS, Take 500 mg by mouth , Disp: , Rfl:     ALLERGIES:  Allergies   Allergen Reactions    Augmentin [Amoxicillin-Pot Clavulanate] Nausea And Vomiting    Bactrim [Sulfamethoxazole-Trimethoprim] Nausea And Vomiting    Vancomycin Rash    Dolobid [Diflunisal] Nausea And Vomiting         REVIEW OF SYSTEMS:    A full 14 point review of systems was performed and assessed and found to be negative except as noted above. PHYSICAL EXAMINATION:    CHAPERONE: Not Required    ECO Asymptomatic    VITAL SIGNS: BP (!) 141/83   Pulse 91   Temp 97 °F (36.1 °C)   Resp 14   Wt 262 lb (118.8 kg)   SpO2 97%   BMI 42.29 kg/m²   GENERAL:  General appearance is that of a well-nourished, well-developed in no apparent distress. HEENT: Normocephalic, atraumatic, EOMI, moist mucosa, no erythema. NECK:  No adenopathy or a palpable thyroid mass, trachea is midline. LYMPHATICS: No cervical, supraclavicular adenopathy.   HEART: Regular rate and rhythm, S1, S2, no murmurs. LUNGS:  Clear to auscultation bilaterally with no wheezing or crackles. ABDOMEN:  Soft, nontender, non distended. EXTREMITIES:  No clubbing, cyanosis, or edema. No calf tenderness. MSK:  No CVA or spinal tenderness. NEUROLOGICAL: No focal deficits. CN II-XII intact. Strength and sensation intact bilaterally. SKIN: No erythema or desquamation. LABS:  WBC   Date Value Ref Range Status   2020 7.3 3.5 - 11.3 k/uL Final     Segs Absolute   Date Value Ref Range Status   2020 4.49 1.50 - 8.10 k/uL Final     Hemoglobin   Date Value Ref Range Status   2020 14.0 11.9 - 15.1 g/dL Final     Platelets   Date Value Ref Range Status   2020 201 138 - 453 k/uL Final        IMAGIN2021 US and Mammo L Breast  Impression   1. Interval lumpectomy at the 6 o'clock left breast.  M-clip is not seen. There is a small postoperative fluid collection at the lumpectomy scar. 2. Scattered punctate round calcifications in the left breast, which appear   unchanged since at least 2018, probably benign.  No suspicious grouped   microcalcifications.  However, as a precaution, short-term follow-up with   diagnostic mammogram is recommended in 6 months. BI-RADS 3       BIRADS:   BIRADS - CATEGORY 3       Probably Benign Findings. A short interval follow-up is recommended in 6   months. ASSESSMENT AND PLAN:  Norma Obando is a 63 y.o. female with a Cancer Staging  Ductal carcinoma in situ (DCIS) of left breast  Staging form: Breast, AJCC 8th Edition  - Pathologic stage from 11/10/2020: Stage 0 (pTis (DCIS), pN0, cM0, G2, ER+, VA+, HER2: Not Assessed) - Signed by Shruthi Herman MD on 2021  -s/p Lump 12/15/20    We reviewed with the patient the testing that has been done so far, including her recent imaging and pathology from her surgery biopsy.  We also reviewed the staging based on the testing that as been done and the recommendations per the NCCN guidelines. We discussed the options of treatment, including surgery, chemotherapy, and radiation, and the rationale of why radiation therapy would be indicated for this diagnosis. We also discussed that they have the option to not pursue our recommended treatment as well. We did review with the patient her recurrence risk based off the [facility] SERVICES.  Patient was given the option to forego radiation, and pursue endocrine therapy alone albeit therapy slightly increased risk of local recurrence. Patient however elected to proceed with radiation treatments as she wants to do everything possible to reduce the risk of cancer coming back. We reviewed the logistics of treatment planning, including a CT Simulation session, as well as daily treatments for approximately 3 weeks. With regards to radiation to the left breast, I discussed the possible short-term side effects of fatigue, skin irritation (causing redness, dryness, or peeling), swelling and tenderness of the left breast, tiredness, low blood counts (causing infection or bleeding) and hair loss in treated area. Possible long-term side effects discussed included change in the cosmetic appearance of the left breast (change in shape, size, and texture of the breast), hyper/hypo-pigmentation of the skin, limited range of motion of the left shoulder, scarring of the lung (causing shortness of breath and cough), damage to the heart, swelling of the left arm i.e. lymphedema (causing pain), damage to the nerves (causing numbness, weakness, or paralysis) and rib fracture. We discussed the use of deep inspirative breath holding techniques during planning and treatment to help reduce the risk of toxicities to the heart. After ample time to review the patient's questions, an informed consent was obtained to proceed with scheduling a treatment planning session for radiation therapy.     I spent greater than 30 minutes counseling and evaluating the different treatment options available to the patient and formulating a plan of action today. Patient was in agreement with my recommendations. All questions were answered to their satisfaction. Patient was advised to contact us anytime should they have any questions or concerns. Electronically signed by Lew Han MD on 1/19/2021 at 11:03 AM        Medications Prescribed:   New Prescriptions    No medications on file       Orders: No orders of the defined types were placed in this encounter.       CC:  Patient Care Team:  Marcel Conley MD as PCP - General (Family Medicine)  Marcel Conley MD as PCP - [facility] Empaneled Provider  Ana Mosley RN as Imaging Navigator  Luke Muñiz RN as Nurse Navigator (Oncology)

## 2021-01-21 ENCOUNTER — OFFICE VISIT (OUTPATIENT)
Dept: ONCOLOGY | Age: 66
End: 2021-01-21
Payer: COMMERCIAL

## 2021-01-21 ENCOUNTER — TELEPHONE (OUTPATIENT)
Dept: ONCOLOGY | Age: 66
End: 2021-01-21

## 2021-01-21 VITALS
TEMPERATURE: 98.3 F | BODY MASS INDEX: 42.79 KG/M2 | SYSTOLIC BLOOD PRESSURE: 115 MMHG | WEIGHT: 265.1 LBS | HEART RATE: 81 BPM | DIASTOLIC BLOOD PRESSURE: 75 MMHG

## 2021-01-21 DIAGNOSIS — D05.12 DUCTAL CARCINOMA IN SITU (DCIS) OF LEFT BREAST: Chronic | ICD-10-CM

## 2021-01-21 DIAGNOSIS — C50.912 MALIGNANT NEOPLASM OF LEFT FEMALE BREAST, UNSPECIFIED ESTROGEN RECEPTOR STATUS, UNSPECIFIED SITE OF BREAST (HCC): Primary | ICD-10-CM

## 2021-01-21 PROCEDURE — 99215 OFFICE O/P EST HI 40 MIN: CPT | Performed by: INTERNAL MEDICINE

## 2021-01-21 PROCEDURE — 99211 OFF/OP EST MAY X REQ PHY/QHP: CPT | Performed by: INTERNAL MEDICINE

## 2021-01-21 RX ORDER — LORAZEPAM 1 MG/1
1 TABLET ORAL EVERY 6 HOURS PRN
Qty: 60 TABLET | Refills: 0 | Status: SHIPPED | OUTPATIENT
Start: 2021-01-21 | End: 2021-02-20

## 2021-01-21 NOTE — PROGRESS NOTES
DIAGNOSIS:   1. DCIS of the left breast, intermediate grade, ER/NJ positive, diagnosed November/2020  CURRENT THERAPY:  1. Underwent lumpectomy 12/15/2020  2. Plan XRT and adjuvant tamoxifen   BRIEF CASE HISTORY:   Mayra Judge is a very pleasant 63 y.o. female who is referred to us for management recommendation of her recently diagnosed DCIS. She recently moved from [place] and she had a screening mammogram 9/2020 that showed indeterminate calcification in the left breast.  Diagnostic mammogram followed on 10/28/2020 and showed abnormal pleomorphic calcification at the 6 o'clock position of the left breast.  Biopsy showed intermediate grade ductal carcinoma in situ that was ER/NJ positive. No invasive disease appreciated. She is sent to us for a consultation, she was already seen by Dr. Jose Dickerson and plans are for surgery next week. The patient feels well, she denies any chest pain or shortness of breath, denies any bone pain. She is in relatively good health but due to stress, her diabetes has been poorly controlled. She has a previous history of deep venous thrombosis. Her mother had breast cancer in her 76s. No other malignancy in the family. She has not taken any exogenous estrogens, she is postmenopausal.   She underwent lumpectomy on 12/15/2020 and pathology showed ductal carcinoma in situ. She needed additional margin removed and the margin showed rare atypical hyperplastic cells but no malignancy. Again the DCIS was ER/NJ positive. INTERIM HISTORY: The patient presents today for follow up from surgery. She is frustrated because clip was not located and does not know current status and is under the impression that there is no cancer based on conflicting pathology reports. She reports her anxiety is elevated and she is not sleeping well and is unable to interact with family due to the pandemic.  She is frustrated by weight gain with insulin, glucose was poorly controlled prior to surgery, palpitations. Gastrointestinal: No nausea or vomiting, abdominal pain, diarrhea or constipation. Genitourinary: Denies dysuria, hematuria, frequency, urgency or incontinence. Neurological: Denies headaches, decreased LOC, no sensory or motor focal deficits. Musculoskeletal: No arthralgia no back pain or joint swelling. Skin: There are no rashes or bleeding. Psychiatric: + anxiety, no depression. Endocrine: Diabetes relatively poorly controlled lately. Hematologic: No bleeding, no adenopathy. PHYSICAL EXAM: Shows a well appearing 63y.o.-year-old female who is not in pain or distress. Vital Signs: Blood pressure 115/75, pulse 81, temperature 98.3 °F (36.8 °C), temperature source Oral, weight 265 lb 1.6 oz (120.2 kg). HEENT: Normocephalic and atraumatic. Pupils are equal, round, reactive to light and accommodation. Extraocular muscles are intact. Neck: Showed no JVD, no carotid bruit . Lungs: Clear to auscultation bilaterally. Heart: Regular without any murmur. Abdomen: Soft, nontender. No hepatosplenomegaly. Extremities: Lower extremities show no edema, clubbing, or cyanosis. Breasts: Examination showed a well healed lumpectomy,   No clear mass or adenopathy appreciated. Right breast is free of any masses or adenopathy. Neuro exam: intact cranial nerves bilaterally no motor or sensory deficit, gait is normal. Lymphatic: no adenopathy appreciated in the supraclavicular, axillary, cervical or inguinal area    REVIEW OF LABORATORY DATA:   Biopsy of the left breast 11/10/2020  Left breast, 6:00, biopsy:       Ductal carcinoma in situ, intermediate grade. Microcalcifications present. Estrogen receptor, positive (>95%). Progestin receptor, positive (>95%). REVIEW OF RADIOLOGICAL RESULTS:   Diagnostic mammogram 10/28/2020  Impression    1.  Calcifications at 6 o'clock left breast posterior depth are pleomorphic    and suspicious with tissue sampling under stereotactic mammographic guidance    advised.      2.  Small asymmetry outer central left breast persists on mammographic    workup.  Ultrasound findings in this location demonstrate appearance of a    complicated cyst identical to that seen on outside ultrasound of July 2017,    not worrisome.         BI-RADS 4 C        IMPRESSION:   1. Ductal carcinoma in situ left breast, intermediate grade, ER/PA positive  2. Some comorbidities including diabetes and history of DVT  3. Some family history of breast cancer but only in the mother above since age 79.  3. Status post lumpectomy 12/15/2020    PLAN:   1. We had detailed review of recent surgery and findings including follow up mammogram did not show remaining clip. 2. I explained in detail the pathology which confirms disease removed with negative margins, I assured her that her disease status and prognosis remains unchanged post lumpectomy. 3. I further explained she is currently in remission and reviewed goals of treatment to prevent recurrence. 4. She is very anxious about her situation , she is frustrated that the specimen did not have the clip and she had a mammogram that showed that there is still scattered calcification. She is still worried that she has residual disease. She states that her original biopsy had some typographical error and she feels that she does not have werner in the current radiological and pathological statements. She wanted that evaluated by second opinion to assure that she is in remission and the DCIS was removed. And I offered her 2nd opinion at tertiary center for further review, she agreed and I will put in referral.  5. We discussed radiation plan in coordination with [facility] review. 6. I offered sleep aid and I am writing for Ativan to be used at night, she may use during the day as needed. 7. Return towards the end of radiation to review treatment plan and any updated recommendations.

## 2021-01-22 ENCOUNTER — TELEPHONE (OUTPATIENT)
Dept: ONCOLOGY | Age: 66
End: 2021-01-22

## 2021-01-26 ENCOUNTER — TELEPHONE (OUTPATIENT)
Dept: FAMILY MEDICINE CLINIC | Age: 66
End: 2021-01-26

## 2021-01-26 NOTE — TELEPHONE ENCOUNTER
Patient called and stated that her oncologist proscribed her Lorazepam, patient states that she was reading  The education information that she got from the pharmacy and it stated that if the patient has sleep apena , that they shouldn't take it. The patient states that she called her oncologist office back and asked them if the medication was safe to take. They told her they didn't know to call her pcp.  Please clarify thanks

## 2021-01-27 ENCOUNTER — HOSPITAL ENCOUNTER (OUTPATIENT)
Dept: RADIATION ONCOLOGY | Age: 66
Discharge: HOME | End: 2021-01-27
Payer: COMMERCIAL

## 2021-01-27 PROCEDURE — 77334 RADIATION TREATMENT AID(S): CPT | Performed by: RADIOLOGY

## 2021-01-27 PROCEDURE — 77263 THER RADIOLOGY TX PLNG CPLX: CPT | Performed by: RADIOLOGY

## 2021-01-27 PROCEDURE — 77290 THER RAD SIMULAJ FIELD CPLX: CPT | Performed by: RADIOLOGY

## 2021-01-29 ENCOUNTER — TELEPHONE (OUTPATIENT)
Dept: ONCOLOGY | Age: 66
End: 2021-01-29

## 2021-01-29 NOTE — TELEPHONE ENCOUNTER
Lanny Nutrition Note    PG-SGA screening tool reviewed with score of 2. Pt reports not feeling up to most things but in bed/chair less than half the day. Full nutrition assessment for scores > 4. Will follow up for full nutrition assessment.     TRICIA De La Vega, RD, LD  Registered Dietitian  [facility] [place]  [phone]

## 2021-02-03 ENCOUNTER — HOSPITAL ENCOUNTER (OUTPATIENT)
Dept: RADIATION ONCOLOGY | Age: 66
Discharge: HOME | End: 2021-02-03
Payer: COMMERCIAL

## 2021-02-03 PROCEDURE — 77295 3-D RADIOTHERAPY PLAN: CPT | Performed by: RADIOLOGY

## 2021-02-03 PROCEDURE — 77334 RADIATION TREATMENT AID(S): CPT | Performed by: RADIOLOGY

## 2021-02-03 PROCEDURE — 77300 RADIATION THERAPY DOSE PLAN: CPT | Performed by: RADIOLOGY

## 2021-02-10 ENCOUNTER — HOSPITAL ENCOUNTER (OUTPATIENT)
Dept: RADIATION ONCOLOGY | Age: 66
Discharge: HOME | End: 2021-02-10
Attending: RADIOLOGY
Payer: COMMERCIAL

## 2021-02-10 PROCEDURE — 77280 THER RAD SIMULAJ FIELD SMPL: CPT | Performed by: RADIOLOGY

## 2021-02-10 PROCEDURE — 77412 RADIATION TX DELIVERY LVL 3: CPT | Performed by: RADIOLOGY

## 2021-02-10 PROCEDURE — 77336 RADIATION PHYSICS CONSULT: CPT | Performed by: RADIOLOGY

## 2021-02-11 ENCOUNTER — HOSPITAL ENCOUNTER (OUTPATIENT)
Dept: RADIATION ONCOLOGY | Age: 66
Discharge: HOME | End: 2021-02-11
Attending: RADIOLOGY
Payer: COMMERCIAL

## 2021-02-11 PROCEDURE — 77412 RADIATION TX DELIVERY LVL 3: CPT | Performed by: RADIOLOGY

## 2021-02-11 PROCEDURE — G6002 STEREOSCOPIC X-RAY GUIDANCE: HCPCS | Performed by: RADIOLOGY

## 2021-02-11 PROCEDURE — 77387 GUIDANCE FOR RADJ TX DLVR: CPT | Performed by: RADIOLOGY

## 2021-02-12 ENCOUNTER — HOSPITAL ENCOUNTER (OUTPATIENT)
Dept: RADIATION ONCOLOGY | Age: 66
Discharge: HOME | End: 2021-02-12
Attending: RADIOLOGY
Payer: COMMERCIAL

## 2021-02-12 PROCEDURE — 77412 RADIATION TX DELIVERY LVL 3: CPT | Performed by: RADIOLOGY

## 2021-02-12 PROCEDURE — 77387 GUIDANCE FOR RADJ TX DLVR: CPT | Performed by: RADIOLOGY

## 2021-02-12 PROCEDURE — G6002 STEREOSCOPIC X-RAY GUIDANCE: HCPCS | Performed by: RADIOLOGY

## 2021-02-15 ENCOUNTER — HOSPITAL ENCOUNTER (OUTPATIENT)
Dept: RADIATION ONCOLOGY | Age: 66
Discharge: HOME | End: 2021-02-15
Attending: RADIOLOGY
Payer: COMMERCIAL

## 2021-02-15 PROCEDURE — G6002 STEREOSCOPIC X-RAY GUIDANCE: HCPCS | Performed by: RADIOLOGY

## 2021-02-15 PROCEDURE — 77387 GUIDANCE FOR RADJ TX DLVR: CPT | Performed by: RADIOLOGY

## 2021-02-15 PROCEDURE — 77412 RADIATION TX DELIVERY LVL 3: CPT | Performed by: RADIOLOGY

## 2021-02-16 ENCOUNTER — APPOINTMENT (OUTPATIENT)
Dept: RADIATION ONCOLOGY | Age: 66
End: 2021-02-16
Attending: RADIOLOGY
Payer: COMMERCIAL

## 2021-02-17 ENCOUNTER — HOSPITAL ENCOUNTER (OUTPATIENT)
Dept: RADIATION ONCOLOGY | Age: 66
Discharge: HOME | End: 2021-02-17
Payer: COMMERCIAL

## 2021-02-17 ENCOUNTER — HOSPITAL ENCOUNTER (OUTPATIENT)
Dept: RADIATION ONCOLOGY | Age: 66
Discharge: HOME | End: 2021-02-17
Attending: RADIOLOGY
Payer: COMMERCIAL

## 2021-02-17 VITALS
RESPIRATION RATE: 16 BRPM | BODY MASS INDEX: 42.45 KG/M2 | WEIGHT: 263 LBS | TEMPERATURE: 98.6 F | DIASTOLIC BLOOD PRESSURE: 71 MMHG | OXYGEN SATURATION: 98 % | HEART RATE: 77 BPM | SYSTOLIC BLOOD PRESSURE: 138 MMHG

## 2021-02-17 PROCEDURE — 77412 RADIATION TX DELIVERY LVL 3: CPT | Performed by: RADIOLOGY

## 2021-02-17 PROCEDURE — 77427 RADIATION TX MANAGEMENT X5: CPT | Performed by: RADIOLOGY

## 2021-02-17 PROCEDURE — G6002 STEREOSCOPIC X-RAY GUIDANCE: HCPCS | Performed by: RADIOLOGY

## 2021-02-17 PROCEDURE — 77336 RADIATION PHYSICS CONSULT: CPT | Performed by: RADIOLOGY

## 2021-02-17 PROCEDURE — 77387 GUIDANCE FOR RADJ TX DLVR: CPT | Performed by: RADIOLOGY

## 2021-02-17 ASSESSMENT — PAIN DESCRIPTION - LOCATION: LOCATION: BREAST

## 2021-02-17 NOTE — PROGRESS NOTES
Kiana Fairly  2/17/2021  Wt Readings from Last 3 Encounters:   02/17/21 263 lb (119.3 kg)   01/21/21 265 lb 1.6 oz (120.2 kg)   01/18/21 262 lb (118.8 kg)     Body mass index is 42.45 kg/m². Treatment Area:L breast     Patient was seen today for weekly visit. Comfort Alteration    Fatigue: Moderate    Nutritional Alteration  Anorexia: No   Nausea: No   Vomiting: No     Mucous Membrane Alteration  Drainage: No  Lymphedema: No    Skin Alteration   Sensation:intact     Radiation Dermatitis:  Intact [x]     Erythema  []     Discoloration  []     Rash []     Dry desquamation  []     Moist desquamation []       Emotional  Coping: effective      Injury, potential bleeding or infection: skin care reviewed     Lab Results   Component Value Date    WBC 7.3 11/30/2020     11/30/2020         /71   Pulse 77   Temp 98.6 °F (37 °C)   Resp 16   Wt 263 lb (119.3 kg)   SpO2 98%   BMI 42.45 kg/m²   Patient Currently in Pain: Yes                 Assessment/Plan: Patient was seen today for weekly visit. Dr Marcie Cardenas notified and examined pt. No new orders received.      Jonas Farnsworth

## 2021-02-18 ENCOUNTER — HOSPITAL ENCOUNTER (OUTPATIENT)
Dept: RADIATION ONCOLOGY | Age: 66
Discharge: HOME | End: 2021-02-18
Attending: RADIOLOGY
Payer: COMMERCIAL

## 2021-02-18 PROCEDURE — 77412 RADIATION TX DELIVERY LVL 3: CPT | Performed by: RADIOLOGY

## 2021-02-18 PROCEDURE — G6002 STEREOSCOPIC X-RAY GUIDANCE: HCPCS | Performed by: RADIOLOGY

## 2021-02-18 PROCEDURE — 77387 GUIDANCE FOR RADJ TX DLVR: CPT | Performed by: RADIOLOGY

## 2021-02-18 NOTE — PROGRESS NOTES
[facility]            Radiation Oncology          [address] [facility]          [place] [address]        [place]: [phone]        F: [phone]       Media Chaperone             RADIATION ONCOLOGY WEEKLY PROGRESS NOTE  Patient ID:   Carrie Marie  : 1955   MRN: [id number]    DIAGNOSIS:  Cancer Staging  Ductal carcinoma in situ (DCIS) of left breast  Staging form: Breast, AJCC 8th Edition  - Pathologic stage from 11/10/2020: Stage 0 (pTis (DCIS), pN0, cM0, G2, ER+, AK+, HER2: Not Assessed) - Signed by Butch Leon MD on 2021      TREATMENT DETAILS:  Treatment Site: L Breast  Actual Dose: 1330cGy  Total Planned Dose: 4256cGy  Treatment Technique: 3D-CRT  Fraction Technique: Daily  Therapy imaging monitoring: KV match daily with MV ports  Concurrent Chemotherapy: NA    SUBJECTIVE:   Patient seen for their weekly on treatment evaluation today. Patient denies any skin irritation swelling or pain. However patient is having significant fatigue and attributes it to lack of sleep. His fatigue but denies any changes in appetite though. Patient was prescribed Ativan however she is afraid to take it as she does have sleep apnea but she does use a CPAP machine. OBJECTIVE:   CHAPERONE: Not Required    ECO Symptomatic but completely ambulatory    VITAL SIGNS: /71   Pulse 77   Temp 98.6 °F (37 °C)   Resp 16   Wt 263 lb (119.3 kg)   SpO2 98%   BMI 42.45 kg/m²   Wt Readings from Last 5 Encounters:   21 263 lb (119.3 kg)   21 265 lb 1.6 oz (120.2 kg)   21 262 lb (118.8 kg)   21 262 lb 12.8 oz (119.2 kg)   21 254 lb (115.2 kg)     GENERAL:  General appearance is that of a well-nourished, well-developed in no apparent distress. EXTREMITIES:  No clubbing, cyanosis, or edema. SKIN: No erythema or desquamation.        LABS:  WBC   Date Value Ref Range Status   2020 7.3 3.5 - 11.3 k/uL Final   2020 6.8 3.5 - 11.3 k/uL Final Segs Absolute   Date Value Ref Range Status   11/30/2020 4.49 1.50 - 8.10 k/uL Final   02/18/2020 3.99 1.50 - 8.10 k/uL Final     Hemoglobin   Date Value Ref Range Status   11/30/2020 14.0 11.9 - 15.1 g/dL Final   02/18/2020 15.2 (H) 11.9 - 15.1 g/dL Final     Platelets   Date Value Ref Range Status   11/30/2020 201 138 - 453 k/uL Final   02/18/2020 See Reflexed IPF Result 138 - 453 k/uL Final     CREATININE   Date Value Ref Range Status   11/30/2020 0.63 0.50 - 0.90 mg/dL Final   02/18/2020 0.57 0.50 - 0.90 mg/dL Final     No results found for: , CEA  No results found for: PSA    MEDICATIONS:    Current Outpatient Medications:     LORazepam (ATIVAN) 1 MG tablet, Take 1 tablet by mouth every 6 hours as needed for Anxiety for up to 60 doses. , Disp: 60 tablet, Rfl: 0    Insulin Degludec (TRESIBA FLEXTOUCH) 200 UNIT/ML SOPN, INJECT 26 UNITS INTO THE SKIN DAILY, Disp: 2 pen, Rfl: 2    metFORMIN (GLUCOPHAGE) 500 MG tablet, TAKE 1 TABLET BY MOUTH TWICE A DAY WITH MEALS, Disp: 180 tablet, Rfl: 1    pioglitazone (ACTOS) 30 MG tablet, TAKE 1 TABLET BY MOUTH EVERY DAY, Disp: 90 tablet, Rfl: 1    Insulin Pen Needle 32G X 4 MM MISC, 1 each by Does not apply route daily, Disp: 100 each, Rfl: 3    atenolol (TENORMIN) 50 MG tablet, TAKE 1 TABLET BY MOUTH EVERY DAY, Disp: 90 tablet, Rfl: 1    pantoprazole (PROTONIX) 40 MG tablet, TAKE 1 TABLET BY MOUTH EVERY DAY, Disp: 90 tablet, Rfl: 1    Multiple Vitamin (MULTI-VITAMIN DAILY PO), Take by mouth daily, Disp: , Rfl:     amLODIPine (NORVASC) 5 MG tablet, Take 1 tablet by mouth daily, Disp: 90 tablet, Rfl: 1    aspirin 81 MG tablet, Take 81 mg by mouth daily, Disp: , Rfl:     Krill Oil 350 MG CAPS, Take 500 mg by mouth , Disp: , Rfl:       ASSESSMENT PLAN:   Treatment setup and plan reviewed. Port images/CBCT images reviewed. Appropriate laboratory work was reviewed.  Treatment side effects and toxicities reviewed with the patient, and appropriate management was advised. Will continue radiation treatment as planned, and recommend patient contact us if they have any questions or concerns. Patient was advised to use Ativan that has been prescribed by Dr. Estee La for bedtime and was advised that it should be okay since she does have a CPAP for her sleep apnea. We will follow-up on her insomnia    Electronically signed by Gracie Briggs MD on 2/18/2021 at 11:47 AM      Drugs Prescribed:  New Prescriptions    No medications on file       Other Orders Placed:  No orders of the defined types were placed in this encounter.

## 2021-02-19 ENCOUNTER — HOSPITAL ENCOUNTER (OUTPATIENT)
Dept: RADIATION ONCOLOGY | Age: 66
Discharge: HOME | End: 2021-02-19
Attending: RADIOLOGY
Payer: COMMERCIAL

## 2021-02-19 PROCEDURE — 77412 RADIATION TX DELIVERY LVL 3: CPT | Performed by: RADIOLOGY

## 2021-02-19 PROCEDURE — 77417 THER RADIOLOGY PORT IMAGE(S): CPT | Performed by: RADIOLOGY

## 2021-02-22 ENCOUNTER — HOSPITAL ENCOUNTER (OUTPATIENT)
Dept: RADIATION ONCOLOGY | Age: 66
Discharge: HOME | End: 2021-02-22
Attending: RADIOLOGY
Payer: COMMERCIAL

## 2021-02-22 PROCEDURE — 77412 RADIATION TX DELIVERY LVL 3: CPT | Performed by: RADIOLOGY

## 2021-02-22 PROCEDURE — G6002 STEREOSCOPIC X-RAY GUIDANCE: HCPCS | Performed by: RADIOLOGY

## 2021-02-22 PROCEDURE — 77387 GUIDANCE FOR RADJ TX DLVR: CPT | Performed by: RADIOLOGY

## 2021-02-23 ENCOUNTER — HOSPITAL ENCOUNTER (OUTPATIENT)
Dept: RADIATION ONCOLOGY | Age: 66
Discharge: HOME | End: 2021-02-23
Attending: RADIOLOGY
Payer: COMMERCIAL

## 2021-02-23 ENCOUNTER — HOSPITAL ENCOUNTER (OUTPATIENT)
Dept: RADIATION ONCOLOGY | Age: 66
Discharge: HOME | End: 2021-02-23
Payer: COMMERCIAL

## 2021-02-23 VITALS
BODY MASS INDEX: 42.58 KG/M2 | WEIGHT: 263.8 LBS | TEMPERATURE: 98 F | HEART RATE: 86 BPM | RESPIRATION RATE: 20 BRPM | OXYGEN SATURATION: 98 % | SYSTOLIC BLOOD PRESSURE: 126 MMHG | DIASTOLIC BLOOD PRESSURE: 83 MMHG

## 2021-02-23 PROCEDURE — G6002 STEREOSCOPIC X-RAY GUIDANCE: HCPCS | Performed by: RADIOLOGY

## 2021-02-23 PROCEDURE — 77387 GUIDANCE FOR RADJ TX DLVR: CPT | Performed by: RADIOLOGY

## 2021-02-23 PROCEDURE — 77412 RADIATION TX DELIVERY LVL 3: CPT | Performed by: RADIOLOGY

## 2021-02-23 ASSESSMENT — PAIN DESCRIPTION - DESCRIPTORS: DESCRIPTORS: DISCOMFORT;BURNING

## 2021-02-23 ASSESSMENT — PAIN DESCRIPTION - LOCATION: LOCATION: BREAST

## 2021-02-23 ASSESSMENT — PAIN SCALES - GENERAL: PAINLEVEL_OUTOF10: 4

## 2021-02-23 ASSESSMENT — PAIN DESCRIPTION - ORIENTATION: ORIENTATION: LEFT

## 2021-02-23 NOTE — PROGRESS NOTES
Jase Shields  2/23/2021  Wt Readings from Last 3 Encounters:   02/23/21 263 lb 12.8 oz (119.7 kg)   02/17/21 263 lb (119.3 kg)   01/21/21 265 lb 1.6 oz (120.2 kg)     Body mass index is 42.58 kg/m². Treatment Area:L breast     Patient was seen today for weekly visit. Comfort Alteration    Fatigue: Severe    Nutritional Alteration  Anorexia: No   Nausea: No   Vomiting: No     Mucous Membrane Alteration  Drainage: No  Lymphedema: No    Skin Alteration   Sensation:burning     Radiation Dermatitis:  Intact [x]     Erythema  [x]     Discoloration  []     Rash []     Dry desquamation  []     Moist desquamation []       Emotional  Coping: somewhat effective, anxiety       Injury, potential bleeding or infection: skin care reviewed. Encouraged use of OTC steroid cream.     Lab Results   Component Value Date    WBC 7.3 11/30/2020     11/30/2020         /83   Pulse 86   Temp 98 °F (36.7 °C)   Resp 20   Wt 263 lb 12.8 oz (119.7 kg)   SpO2 98%   BMI 42.58 kg/m²   Patient Currently in Pain: Yes     Pain Level: 4           Assessment/Plan: Patient was seen today for weekly visit. Dr Fabiana Ghosh notified and examined pt with chaperone.        Dominick Mariano

## 2021-02-24 ENCOUNTER — HOSPITAL ENCOUNTER (OUTPATIENT)
Dept: RADIATION ONCOLOGY | Age: 66
Discharge: HOME | End: 2021-02-24
Attending: RADIOLOGY
Payer: COMMERCIAL

## 2021-02-24 PROCEDURE — 77387 GUIDANCE FOR RADJ TX DLVR: CPT | Performed by: RADIOLOGY

## 2021-02-24 PROCEDURE — 77427 RADIATION TX MANAGEMENT X5: CPT | Performed by: RADIOLOGY

## 2021-02-24 PROCEDURE — 77336 RADIATION PHYSICS CONSULT: CPT | Performed by: RADIOLOGY

## 2021-02-24 PROCEDURE — G6002 STEREOSCOPIC X-RAY GUIDANCE: HCPCS | Performed by: RADIOLOGY

## 2021-02-24 PROCEDURE — 77412 RADIATION TX DELIVERY LVL 3: CPT | Performed by: RADIOLOGY

## 2021-02-25 ENCOUNTER — HOSPITAL ENCOUNTER (OUTPATIENT)
Dept: RADIATION ONCOLOGY | Age: 66
Discharge: HOME | End: 2021-02-25
Attending: RADIOLOGY
Payer: COMMERCIAL

## 2021-02-25 PROCEDURE — G6002 STEREOSCOPIC X-RAY GUIDANCE: HCPCS | Performed by: RADIOLOGY

## 2021-02-25 PROCEDURE — 77387 GUIDANCE FOR RADJ TX DLVR: CPT | Performed by: RADIOLOGY

## 2021-02-25 PROCEDURE — 77412 RADIATION TX DELIVERY LVL 3: CPT | Performed by: RADIOLOGY

## 2021-02-26 ENCOUNTER — HOSPITAL ENCOUNTER (OUTPATIENT)
Dept: RADIATION ONCOLOGY | Age: 66
Discharge: HOME | End: 2021-02-26
Attending: RADIOLOGY
Payer: COMMERCIAL

## 2021-02-26 PROCEDURE — 77412 RADIATION TX DELIVERY LVL 3: CPT | Performed by: RADIOLOGY

## 2021-02-26 PROCEDURE — 77417 THER RADIOLOGY PORT IMAGE(S): CPT | Performed by: RADIOLOGY

## 2021-03-01 ENCOUNTER — HOSPITAL ENCOUNTER (OUTPATIENT)
Dept: RADIATION ONCOLOGY | Age: 66
Discharge: HOME | End: 2021-03-01
Attending: RADIOLOGY
Payer: COMMERCIAL

## 2021-03-01 PROCEDURE — 77387 GUIDANCE FOR RADJ TX DLVR: CPT | Performed by: RADIOLOGY

## 2021-03-01 PROCEDURE — G6002 STEREOSCOPIC X-RAY GUIDANCE: HCPCS | Performed by: RADIOLOGY

## 2021-03-01 PROCEDURE — 77412 RADIATION TX DELIVERY LVL 3: CPT | Performed by: RADIOLOGY

## 2021-03-02 ENCOUNTER — HOSPITAL ENCOUNTER (OUTPATIENT)
Dept: RADIATION ONCOLOGY | Age: 66
Discharge: HOME | End: 2021-03-02
Attending: RADIOLOGY
Payer: COMMERCIAL

## 2021-03-02 PROCEDURE — 77387 GUIDANCE FOR RADJ TX DLVR: CPT | Performed by: RADIOLOGY

## 2021-03-02 PROCEDURE — G6002 STEREOSCOPIC X-RAY GUIDANCE: HCPCS | Performed by: RADIOLOGY

## 2021-03-02 PROCEDURE — 77412 RADIATION TX DELIVERY LVL 3: CPT | Performed by: RADIOLOGY

## 2021-03-03 ENCOUNTER — HOSPITAL ENCOUNTER (OUTPATIENT)
Dept: RADIATION ONCOLOGY | Age: 66
Discharge: HOME | End: 2021-03-03
Payer: COMMERCIAL

## 2021-03-03 ENCOUNTER — HOSPITAL ENCOUNTER (OUTPATIENT)
Dept: RADIATION ONCOLOGY | Age: 66
Discharge: HOME | End: 2021-03-03
Attending: RADIOLOGY
Payer: COMMERCIAL

## 2021-03-03 VITALS
SYSTOLIC BLOOD PRESSURE: 130 MMHG | OXYGEN SATURATION: 98 % | WEIGHT: 272 LBS | HEART RATE: 77 BPM | TEMPERATURE: 98 F | DIASTOLIC BLOOD PRESSURE: 74 MMHG | RESPIRATION RATE: 18 BRPM | BODY MASS INDEX: 43.9 KG/M2

## 2021-03-03 PROCEDURE — G6002 STEREOSCOPIC X-RAY GUIDANCE: HCPCS | Performed by: RADIOLOGY

## 2021-03-03 PROCEDURE — 77387 GUIDANCE FOR RADJ TX DLVR: CPT | Performed by: RADIOLOGY

## 2021-03-03 PROCEDURE — 77427 RADIATION TX MANAGEMENT X5: CPT | Performed by: RADIOLOGY

## 2021-03-03 PROCEDURE — 77412 RADIATION TX DELIVERY LVL 3: CPT | Performed by: RADIOLOGY

## 2021-03-03 ASSESSMENT — PAIN DESCRIPTION - ORIENTATION: ORIENTATION: LEFT

## 2021-03-03 NOTE — PROGRESS NOTES
Rafaela Dials  3/3/2021  Wt Readings from Last 3 Encounters:   03/03/21 272 lb (123.4 kg)   02/23/21 263 lb 12.8 oz (119.7 kg)   02/17/21 263 lb (119.3 kg)     Body mass index is 43.9 kg/m². Treatment Area: left breast     Patient was seen today for weekly visit. Comfort Alteration    Fatigue: Mild    Nutritional Alteration  Anorexia: No   Nausea: No   Vomiting: No     Mucous Membrane Alteration  Drainage: No  Lymphedema: No    Skin Alteration   Sensation:burning     Radiation Dermatitis:  Intact [x]     Erythema  [x]     Discoloration  [x]     Rash []     Dry desquamation  []     Moist desquamation []       Emotional  Coping: not very effective, pt reports feeling of depression. Writer provided emotional support and also suggested [employer]. Injury, potential bleeding or infection:n/a     Lab Results   Component Value Date    WBC 7.3 11/30/2020     11/30/2020         /74   Pulse 77   Temp 98 °F (36.7 °C)   Resp 18   Wt 272 lb (123.4 kg)   SpO2 98%   BMI 43.90 kg/m²   Patient Currently in Pain: Yes     Pain Level: 4           Assessment/Plan: Patient was seen today for weekly visit. Dr Lisha Crawford notified and examined pt.        Don Deutsch

## 2021-03-04 ENCOUNTER — HOSPITAL ENCOUNTER (OUTPATIENT)
Dept: RADIATION ONCOLOGY | Age: 66
Discharge: HOME | End: 2021-03-04
Attending: RADIOLOGY
Payer: COMMERCIAL

## 2021-03-04 PROCEDURE — G6002 STEREOSCOPIC X-RAY GUIDANCE: HCPCS | Performed by: RADIOLOGY

## 2021-03-04 PROCEDURE — 77387 GUIDANCE FOR RADJ TX DLVR: CPT | Performed by: RADIOLOGY

## 2021-03-04 PROCEDURE — 77412 RADIATION TX DELIVERY LVL 3: CPT | Performed by: RADIOLOGY

## 2021-03-04 NOTE — PROGRESS NOTES
\   [facility]            Radiation Oncology          [address] [facility]          [address]        Ernesto Bracket: [phone]        F: [phone]       [email or web address]             RADIATION ONCOLOGY WEEKLY PROGRESS NOTE  Patient ID:   Adam Thompson  : 1955   MRN: [id number]    DIAGNOSIS:  Cancer Staging  Ductal carcinoma in situ (DCIS) of left breast  Staging form: Breast, AJCC 8th Edition  - Pathologic stage from 11/10/2020: Stage 0 (pTis (DCIS), pN0, cM0, G2, ER+, WI+, HER2: Not Assessed) - Signed by Ramesh Drew MD on 2021      TREATMENT DETAILS:  Treatment Site: L Breast  Actual Dose: 3990cGy  Total Planned Dose: 4256cGy  Treatment Technique: 3D-CRT  Fraction Technique: Daily  Therapy imaging monitoring: KV match daily with MV ports  Concurrent Chemotherapy: NA    SUBJECTIVE:   Patient seen for their weekly on treatment evaluation today. Patient denies any skin irritation on the does feel some mild swelling. She did start using CBD oil which is helping with her sleep. She otherwise denies any other changes in appetite. Patient does have some symptoms of possible depression as she is alone and does not have family around. OBJECTIVE:   CHAPERONE: Not Required    ECO Symptomatic but completely ambulatory    VITAL SIGNS: /74   Pulse 77   Temp 98 °F (36.7 °C)   Resp 18   Wt 272 lb (123.4 kg)   SpO2 98%   BMI 43.90 kg/m²   Wt Readings from Last 5 Encounters:   21 272 lb (123.4 kg)   21 263 lb 12.8 oz (119.7 kg)   21 263 lb (119.3 kg)   21 265 lb 1.6 oz (120.2 kg)   21 262 lb (118.8 kg)     GENERAL:  General appearance is that of a well-nourished, well-developed in no apparent distress. EXTREMITIES:  No clubbing, cyanosis, or edema. SKIN: Mild erythema no desquamation.       LABS:  WBC   Date Value Ref Range Status   2020 7.3 3.5 - 11.3 k/uL Final   2020 6.8 3.5 - 11.3 k/uL Final     Segs Absolute   Date Value Ref Range Status   11/30/2020 4.49 1.50 - 8.10 k/uL Final   02/18/2020 3.99 1.50 - 8.10 k/uL Final     Hemoglobin   Date Value Ref Range Status   11/30/2020 14.0 11.9 - 15.1 g/dL Final   02/18/2020 15.2 (H) 11.9 - 15.1 g/dL Final     Platelets   Date Value Ref Range Status   11/30/2020 201 138 - 453 k/uL Final   02/18/2020 See Reflexed IPF Result 138 - 453 k/uL Final     CREATININE   Date Value Ref Range Status   11/30/2020 0.63 0.50 - 0.90 mg/dL Final   02/18/2020 0.57 0.50 - 0.90 mg/dL Final     No results found for: , CEA  No results found for: PSA    MEDICATIONS:    Current Outpatient Medications:     Insulin Degludec (TRESIBA FLEXTOUCH) 200 UNIT/ML SOPN, INJECT 26 UNITS INTO THE SKIN DAILY, Disp: 2 pen, Rfl: 2    metFORMIN (GLUCOPHAGE) 500 MG tablet, TAKE 1 TABLET BY MOUTH TWICE A DAY WITH MEALS, Disp: 180 tablet, Rfl: 1    pioglitazone (ACTOS) 30 MG tablet, TAKE 1 TABLET BY MOUTH EVERY DAY, Disp: 90 tablet, Rfl: 1    Insulin Pen Needle 32G X 4 MM MISC, 1 each by Does not apply route daily, Disp: 100 each, Rfl: 3    atenolol (TENORMIN) 50 MG tablet, TAKE 1 TABLET BY MOUTH EVERY DAY, Disp: 90 tablet, Rfl: 1    pantoprazole (PROTONIX) 40 MG tablet, TAKE 1 TABLET BY MOUTH EVERY DAY, Disp: 90 tablet, Rfl: 1    Multiple Vitamin (MULTI-VITAMIN DAILY PO), Take by mouth daily, Disp: , Rfl:     amLODIPine (NORVASC) 5 MG tablet, Take 1 tablet by mouth daily, Disp: 90 tablet, Rfl: 1    aspirin 81 MG tablet, Take 81 mg by mouth daily, Disp: , Rfl:     Krill Oil 350 MG CAPS, Take 500 mg by mouth , Disp: , Rfl:       ASSESSMENT PLAN:   Treatment setup and plan reviewed. Port images/CBCT images reviewed. Appropriate laboratory work was reviewed. Treatment side effects and toxicities reviewed with the patient, and appropriate management was advised. Will continue radiation treatment as planned, and recommend patient contact us if they have any questions or concerns.   Patient was advised to continue using her CBD oils to help her sleep. Patient is advised to use Tylenol if needed for breast tenderness. Patient is advised to continue using moisturizer creams as directed. Patient was [facility] for assessment and help with stress as well as recommended to follow-up with her PCP regarding her weight gain and her diabetes. Patient will come back to see us in approximately 3 months for follow-up visit exam.    Electronically signed by Jocelyn Mitchell MD on 3/3/2021 at 8:16 PM      Drugs Prescribed:  New Prescriptions    No medications on file       Other Orders Placed:  No orders of the defined types were placed in this encounter.

## 2021-03-15 NOTE — PROGRESS NOTES
[facility]            Radiation Oncology          [address] [facility]          [address]        Kayla Ryan: [phone]        F: [phone]       [email or web address]         Dear Dr Emilie Lewis: Thank you for referring Houston Warren to me for evaluation and treatment. Below is a summary of the patient's recently completed radiation course. If you have questions, please do not hesitate to call me. I look forward to following this patient along with you. Sincerely,  Electronically signed by uJan Raygoza MD on 3/15/21 at 4:37 PM EDT      CC: Patient Care Team:  Temitope Green MD as PCP - General (Family Medicine)  Temitope Green MD as PCP - [facility]  Joi Ledesma RN as Imaging Navigator  Dario Mendoza RN as Nurse Navigator (Oncology)  ------------------------------------------------------------------------------------------------------------------------------------------------------------------------------------------        Date of Service: 3/4/2021     Location:  [address] [place] [address],   [address] [place] [address]., Brigette Jerrynu   [phone]        RADIATION ONCOLOGY END OF TREATMENT SUMMARY:    Patient ID:   Houston Warren  : 1955   MRN: [id number]    DIAGNOSIS:  Cancer Staging  Ductal carcinoma in situ (DCIS) of left breast  Staging form: Breast, AJCC 8th Edition  - Pathologic stage from 11/10/2020: Stage 0 (pTis (DCIS), pN0, cM0, G2, ER+, WI+, HER2: Not Assessed) - Signed by Juan Raygoza MD on 2021      TREATMENT DETAILS:    Treatment Technique: 3D-CRT  Fraction Technique: Daily         Treatment Summary:  Radiation Oncology - Course: 1 Protocol:    Treatment Site Current Dose Modality From To Elapsed Days Fx. L Breast  4,256 cGy x15  2/10/2021  3/04/2021  22 16                    Concurrent Chemotherapy: NA    CLINICAL COURSE:    Patient completed the treatment as prescribed and tolerated treatment as expected.  Patient developed fatigue and some mild skin irritation. Patient will come back in 3 months for a follow up visit and to assess treatment toxicity and response. Patient was advised to continue close follow up with medical oncology and surgery as well. Patient does have our contact information in case they have any questions or concerns in the interim.     Electronically signed by Tyson Cobb MD on 3/15/2021 at 4:37 PM

## 2021-03-25 ENCOUNTER — TELEPHONE (OUTPATIENT)
Dept: ONCOLOGY | Age: 66
End: 2021-03-25

## 2021-03-25 ENCOUNTER — OFFICE VISIT (OUTPATIENT)
Dept: ONCOLOGY | Age: 66
End: 2021-03-25
Payer: COMMERCIAL

## 2021-03-25 VITALS
WEIGHT: 272.2 LBS | BODY MASS INDEX: 43.93 KG/M2 | SYSTOLIC BLOOD PRESSURE: 133 MMHG | TEMPERATURE: 98.4 F | DIASTOLIC BLOOD PRESSURE: 77 MMHG | RESPIRATION RATE: 18 BRPM | HEART RATE: 76 BPM

## 2021-03-25 DIAGNOSIS — D05.12 DUCTAL CARCINOMA IN SITU (DCIS) OF LEFT BREAST: Chronic | ICD-10-CM

## 2021-03-25 PROCEDURE — 99211 OFF/OP EST MAY X REQ PHY/QHP: CPT | Performed by: INTERNAL MEDICINE

## 2021-03-25 PROCEDURE — 99215 OFFICE O/P EST HI 40 MIN: CPT | Performed by: INTERNAL MEDICINE

## 2021-03-25 RX ORDER — ANASTROZOLE 1 MG/1
1 TABLET ORAL DAILY
Qty: 30 TABLET | Refills: 6 | Status: SHIPPED | OUTPATIENT
Start: 2021-03-25 | End: 2021-07-07 | Stop reason: SINTOL

## 2021-03-25 NOTE — PROGRESS NOTES
DIAGNOSIS:   1. DCIS of the left breast, intermediate grade, ER/MO positive, diagnosed November/2020  CURRENT THERAPY:  1. Underwent lumpectomy 12/15/2020  2. Radiation completed 03/11/2021  3. Plan for Anastrozole  BRIEF CASE HISTORY:   Manjeet Davila is a very pleasant 63 y.o. female who is referred to us for management recommendation of her recently diagnosed DCIS. She recently moved from [place] and she had a screening mammogram 9/2020 that showed indeterminate calcification in the left breast.  Diagnostic mammogram followed on 10/28/2020 and showed abnormal pleomorphic calcification at the 6 o'clock position of the left breast.  Biopsy showed intermediate grade ductal carcinoma in situ that was ER/MO positive. No invasive disease appreciated. She is sent to us for a consultation, she was already seen by Dr. Naif Turner and plans are for surgery next week. The patient feels well, she denies any chest pain or shortness of breath, denies any bone pain. She is in relatively good health but due to stress, her diabetes has been poorly controlled. She has a previous history of deep venous thrombosis. Her mother had breast cancer in her 76s. No other malignancy in the family. She has not taken any exogenous estrogens, she is postmenopausal.   She underwent lumpectomy on 12/15/2020 and pathology showed ductal carcinoma in situ. She needed additional margin removed and the margin showed rare atypical hyperplastic cells but no malignancy. Again the DCIS was ER/MO positive. She completed radiation 03/11/2021 and we plan to start anastrozole. INTERIM HISTORY: The patient presents today for follow up for breast cancer recommendations. She consulted with [facility] and reports they were unable to access her local records and was unable to receive recommendations. She has completed radiation with no complications. She has 2nd COVID dose scheduled for next week.      PAST MEDICAL HISTORY: has a past medical history of Breast cancer ([facility] [address] Diabetes mellitus ([facility] [address] Emphysema (subcutaneous) (surgical) resulting from a procedure, Hx of blood clots, Hypertension, Kidney calculi, Neuropathy, DIONI on CPAP, Prolonged emergence from general anesthesia, and Sleep apnea. PAST SURGICAL HISTORY: has a past surgical history that includes other surgical history; Cholecystectomy; Knee arthroscopy (Left, 1995); Tonsillectomy; Colonoscopy; Colonoscopy (10/26/2020); Colonoscopy (N/A, 10/26/2020); HARI STEREO BREAST BX W LOC DEVICE 1ST LESION LEFT (11/10/2020); Endoscopy, colon, diagnostic; US BIOPSY THYROID; and Breast biopsy (Left, 12/15/2020). CURRENT MEDICATIONS:  has a current medication list which includes the following prescription(s): tresiba flextouch, metformin, pioglitazone, insulin pen needle, atenolol, pantoprazole, multiple vitamin, amlodipine, aspirin, and krill oil. ALLERGIES:  is allergic to augmentin [amoxicillin-pot clavulanate]; bactrim [sulfamethoxazole-trimethoprim]; vancomycin; and dolobid [diflunisal]. FAMILY HISTORY: family history includes Alzheimer's Disease in her mother; Breast Cancer in her mother; Diabetes type 2  in her mother; Heart Defect in her mother; Heart Disease in her father; Other in her father. SOCIAL HISTORY:  reports that she quit smoking about 21 months ago. She has a 88.00 pack-year smoking history. She has never used smokeless tobacco. She reports previous alcohol use. She reports that she does not use drugs. REVIEW OF SYSTEMS:   General: No fever or night sweats. Weight gain. +insomnia secondary to anxiety  ENT: No double or blurred vision, no tinnitus or hearing problem, no dysphagia or sore throat   Respiratory: No chest pain, no shortness of breath, no cough or hemoptysis. Cardiovascular: Denies chest pain, PND or orthopnea. No L E swelling or palpitations. Gastrointestinal: No nausea or vomiting, abdominal pain, diarrhea or constipation.    Genitourinary: Denies dysuria, hematuria, frequency, urgency or incontinence. Neurological: Denies headaches, decreased LOC, no sensory or motor focal deficits. Musculoskeletal: No arthralgia no back pain or joint swelling. Skin: There are no rashes or bleeding. Psychiatric: + anxiety, no depression. Endocrine: Diabetes relatively poorly controlled lately. Hematologic: No bleeding, no adenopathy. PHYSICAL EXAM: Shows a well appearing 63y.o.-year-old female who is not in pain or distress. Vital Signs: Blood pressure 133/77, pulse 76, temperature 98.4 °F (36.9 °C), temperature source Oral, resp. rate 18, weight 272 lb 3.2 oz (123.5 kg). HEENT: Normocephalic and atraumatic. Pupils are equal, round, reactive to light and accommodation. Extraocular muscles are intact. Neck: Showed no JVD, no carotid bruit . Lungs: Clear to auscultation bilaterally. Heart: Regular without any murmur. Abdomen: Soft, nontender. No hepatosplenomegaly. Extremities: Lower extremities show no edema, clubbing, or cyanosis. Breasts: Examination showed a well healed lumpectomy, mild redness around the nipple - no skin break down. No clear mass or adenopathy appreciated. Right breast is free of any masses or adenopathy.   Neuro exam: intact cranial nerves bilaterally no motor or sensory deficit, gait is normal. Lymphatic: no adenopathy appreciated in the supraclavicular, axillary, cervical or inguinal area    REVIEW OF LABORATORY DATA:   Lab Results   Component Value Date    WBC 7.3 11/30/2020    HGB 14.0 11/30/2020    HCT 43.5 11/30/2020    MCV 94.8 11/30/2020     11/30/2020       Chemistry        Component Value Date/Time     11/30/2020 0951    K 4.5 11/30/2020 0951     11/30/2020 0951    CO2 22 11/30/2020 0951    BUN 16 11/30/2020 0951    CREATININE 0.63 11/30/2020 0951        Component Value Date/Time    CALCIUM 9.2 11/30/2020 0951    ALKPHOS 77 11/30/2020 0951    AST 18 11/30/2020 0951    ALT 17 11/30/2020 0951    BILITOT 0.61 11/30/2020 0951            REVIEW OF RADIOLOGICAL RESULTS:   DEXA 09/28/2020:   T-SCORE:   LUMBAR: -0.5  HIP: -0.2  FEMORAL NECK: -1.6      IMPRESSION:   1. Ductal carcinoma in situ left breast, intermediate grade, ER/IN positive  2. Some comorbidities including diabetes and history of DVT  3. Some family history of breast cancer but only in the mother above since age 79.  3. Status post lumpectomy 12/15/2020 and completion of radiation 03/11/2021, plan for Anastrozole    PLAN:   1. We had discussion regarding her visit with [facility] including physician notes which recommend radiation followed by endocrine therapy   2. As for the choice of endocrine therapy. 2 drugs have been approved. Tamoxifen and aromatase inhibitors. The second opinion at [facility] clinic clearly favors aromatase inhibitor and the patient is adamant about sticking to that recommendation. I am concerned about her osteopenia and we will discuss that down the line. Likely to offer prolia or bisphosphonate with it. 3. The patient has completed radiation and is agreeable with proceeding with Anastrozole and I will write for it. 4. She would like to start Anastrozole after hr 2nd COVID dose which is fine and I discussed plan for 5 years of therapy and potential side effects. 5. I reviewed her DEXA, she has osteopenia and we will discuss option down the line. 6. I epxlained follow up per the guidelines. 7. Return in 6 weeks for toxicity check.

## 2021-03-25 NOTE — TELEPHONE ENCOUNTER
Royal Powers has completed radiation therapy. Dr. Porfirio Cartwright is starting her on Anastrazole. Navigation letter offering oncology rehab and survivorship visit mailed. Pt on pending.

## 2021-03-25 NOTE — TELEPHONE ENCOUNTER
AVS from 3/25/21      In 6 weeks or so.  Need cmp, cbc    rv scheduled for 5/6/21 @ 11:30am  Labs will drawn at that time    Pt was given AVS and appt schedule

## 2021-04-02 ENCOUNTER — NURSE TRIAGE (OUTPATIENT)
Dept: OTHER | Facility: CLINIC | Age: 66
End: 2021-04-02

## 2021-04-02 NOTE — TELEPHONE ENCOUNTER
Reason for Disposition   [1] Fever > 100.0 F (37.8 C) AND [2] diabetes mellitus or weak immune system (e.g., HIV positive, cancer chemo, splenectomy, organ transplant, chronic steroids) AND [3] started > 48 hours after getting vaccine    Answer Assessment - Initial Assessment Questions  1. MAIN CONCERN OR SYMPTOM:  \"What is your main concern right now? \" \"What question do you have? \" \"What's the main symptom you're worried about? \" (e.g., fever, pain, redness, swelling)      Muscle spasms in feel and left hand, joint pain, body aches, fever (101.9), nausea     2. VACCINE: \"What vaccination did you receive? \" \"Is this your first or second shot? \" (e.g., none; Carmelia Sana, other)      Moderna    3. SYMPTOM ONSET: \"When did the symptoms begin? \" (e.g., not relevant; hours, days)       Symptoms began yesterday - but were much worse this morning    4. SYMPTOM SEVERITY: \"How bad is it? \"       She is concerned but not panicked    5. FEVER: \"Is there a fever? \" If so, ask: \"What is it, how was it measured, and when did it start? \"       Fever (101.9)    6. PAST REACTIONS: \"Have you reacted to immunizations before? \" If so, ask: \"What happened? \"      No    7. OTHER SYMPTOMS: \"Do you have any other symptoms? \"      See item 1    Protocols used: CORONAVIRUS (COVID-19) VACCINE QUESTIONS AND REACTIONS-ADULT-[place]    See triage above - Patient is frustrated because she called her PCP's office and was transferred to the [facility]. She is a [address] client out of the [place] area. Due to fever, recommendation is that she be seen in the [address] [place], [place] [address]r [facility] or to PCP with approval of CNP. As a result, a second level triage was requested from Kelle Meier CNP. Renzo Carreon recommends she be seen by her PCP today and failing that she should be seen in an urgent care. Tylenol recommended for fever relief. Transfer to Leland at the [address] [place] [address] for scheduling.

## 2021-04-14 ENCOUNTER — HOSPITAL ENCOUNTER (OUTPATIENT)
Dept: VASCULAR LAB | Facility: CLINIC | Age: 66
Discharge: HOME | End: 2021-04-14
Payer: COMMERCIAL

## 2021-04-14 ENCOUNTER — OFFICE VISIT (OUTPATIENT)
Dept: FAMILY MEDICINE CLINIC | Age: 66
End: 2021-04-14
Payer: COMMERCIAL

## 2021-04-14 VITALS
SYSTOLIC BLOOD PRESSURE: 116 MMHG | HEART RATE: 82 BPM | OXYGEN SATURATION: 94 % | DIASTOLIC BLOOD PRESSURE: 70 MMHG | HEIGHT: 66 IN | TEMPERATURE: 97.1 F | BODY MASS INDEX: 43.3 KG/M2 | WEIGHT: 269.4 LBS

## 2021-04-14 DIAGNOSIS — Z79.4 TYPE 2 DIABETES MELLITUS WITHOUT COMPLICATION, WITH LONG-TERM CURRENT USE OF INSULIN (HCC): Primary | ICD-10-CM

## 2021-04-14 DIAGNOSIS — Z11.4 ENCOUNTER FOR SCREENING FOR HIV: ICD-10-CM

## 2021-04-14 DIAGNOSIS — D05.12 DUCTAL CARCINOMA IN SITU (DCIS) OF LEFT BREAST: Chronic | ICD-10-CM

## 2021-04-14 DIAGNOSIS — G89.29 CHRONIC PAIN OF RIGHT LOWER EXTREMITY: ICD-10-CM

## 2021-04-14 DIAGNOSIS — E11.9 TYPE 2 DIABETES MELLITUS WITHOUT COMPLICATION, WITH LONG-TERM CURRENT USE OF INSULIN (HCC): Primary | ICD-10-CM

## 2021-04-14 DIAGNOSIS — Z13.220 SCREENING FOR HYPERLIPIDEMIA: ICD-10-CM

## 2021-04-14 DIAGNOSIS — I10 ESSENTIAL HYPERTENSION: ICD-10-CM

## 2021-04-14 DIAGNOSIS — E66.01 SEVERE OBESITY (BMI >= 40) (HCC): Chronic | ICD-10-CM

## 2021-04-14 DIAGNOSIS — M79.604 CHRONIC PAIN OF RIGHT LOWER EXTREMITY: ICD-10-CM

## 2021-04-14 DIAGNOSIS — Z11.59 NEED FOR HEPATITIS C SCREENING TEST: ICD-10-CM

## 2021-04-14 LAB — HBA1C MFR BLD: 10.9 %

## 2021-04-14 PROCEDURE — 83036 HEMOGLOBIN GLYCOSYLATED A1C: CPT | Performed by: FAMILY MEDICINE

## 2021-04-14 PROCEDURE — 93971 EXTREMITY STUDY: CPT

## 2021-04-14 PROCEDURE — 99214 OFFICE O/P EST MOD 30 MIN: CPT | Performed by: FAMILY MEDICINE

## 2021-04-14 RX ORDER — DULAGLUTIDE 0.75 MG/.5ML
0.75 INJECTION, SOLUTION SUBCUTANEOUS WEEKLY
Qty: 4 PEN | Refills: 0 | Status: SHIPPED | OUTPATIENT
Start: 2021-04-14 | End: 2021-05-10
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
## 2021-04-19 ASSESSMENT — ENCOUNTER SYMPTOMS
EYES NEGATIVE: 1
GASTROINTESTINAL NEGATIVE: 1
RESPIRATORY NEGATIVE: 1

## 2021-04-27 ENCOUNTER — HOSPITAL ENCOUNTER (OUTPATIENT)
Age: 66
Setting detail: SPECIMEN
Discharge: HOME | End: 2021-04-27
Payer: COMMERCIAL

## 2021-04-27 DIAGNOSIS — Z11.59 NEED FOR HEPATITIS C SCREENING TEST: ICD-10-CM

## 2021-04-27 DIAGNOSIS — Z11.4 ENCOUNTER FOR SCREENING FOR HIV: ICD-10-CM

## 2021-04-27 DIAGNOSIS — Z13.220 SCREENING FOR HYPERLIPIDEMIA: ICD-10-CM

## 2021-04-27 DIAGNOSIS — Z79.4 TYPE 2 DIABETES MELLITUS WITHOUT COMPLICATION, WITH LONG-TERM CURRENT USE OF INSULIN (HCC): ICD-10-CM

## 2021-04-27 DIAGNOSIS — E11.9 TYPE 2 DIABETES MELLITUS WITHOUT COMPLICATION, WITH LONG-TERM CURRENT USE OF INSULIN (HCC): ICD-10-CM

## 2021-04-27 LAB
ALBUMIN SERPL-MCNC: 4.1 G/DL (ref 3.5–5.2)
ALBUMIN/GLOBULIN RATIO: 1.3 (ref 1–2.5)
ALP BLD-CCNC: 78 U/L (ref 35–104)
ALT SERPL-CCNC: 21 U/L (ref 5–33)
ANION GAP SERPL CALCULATED.3IONS-SCNC: 11 MMOL/L (ref 9–17)
AST SERPL-CCNC: 18 U/L
BILIRUB SERPL-MCNC: 0.63 MG/DL (ref 0.3–1.2)
BUN BLDV-MCNC: 9 MG/DL (ref 8–23)
BUN/CREAT BLD: ABNORMAL (ref 9–20)
CALCIUM SERPL-MCNC: 9.3 MG/DL (ref 8.6–10.4)
CHLORIDE BLD-SCNC: 101 MMOL/L (ref 98–107)
CHOLESTEROL, FASTING: 196 MG/DL
CHOLESTEROL/HDL RATIO: 3.3
CO2: 25 MMOL/L (ref 20–31)
CREAT SERPL-MCNC: 0.68 MG/DL (ref 0.5–0.9)
GFR AFRICAN AMERICAN: >60 ML/MIN
GFR NON-AFRICAN AMERICAN: >60 ML/MIN
GFR SERPL CREATININE-BSD FRML MDRD: ABNORMAL ML/MIN/{1.73_M2}
GFR SERPL CREATININE-BSD FRML MDRD: ABNORMAL ML/MIN/{1.73_M2}
GLUCOSE FASTING: 150 MG/DL (ref 70–99)
HDLC SERPL-MCNC: 59 MG/DL
HEPATITIS C ANTIBODY: NONREACTIVE
HIV AG/AB: NONREACTIVE
LDL CHOLESTEROL: 108 MG/DL (ref 0–130)
POTASSIUM SERPL-SCNC: 4.7 MMOL/L (ref 3.7–5.3)
SODIUM BLD-SCNC: 137 MMOL/L (ref 135–144)
TOTAL PROTEIN: 7.3 G/DL (ref 6.4–8.3)
TRIGLYCERIDE, FASTING: 144 MG/DL
VLDLC SERPL CALC-MCNC: NORMAL MG/DL (ref 1–30)

## 2021-05-07 ENCOUNTER — TELEPHONE (OUTPATIENT)
Dept: ONCOLOGY | Age: 66
End: 2021-05-07

## 2021-05-07 NOTE — TELEPHONE ENCOUNTER
Called  and left message. Reminded her that I am her oncology nurse navigator at [facility] ([facility]). Let her know that I wanted to check in with her, see how she is doing. Reminded her that I am here as a resource, support, and to help facilitate care. Encouraged her to call me. Left my name and contact information.

## 2021-05-10 DIAGNOSIS — Z79.4 TYPE 2 DIABETES MELLITUS WITHOUT COMPLICATION, WITH LONG-TERM CURRENT USE OF INSULIN (HCC): ICD-10-CM

## 2021-05-10 DIAGNOSIS — E11.9 TYPE 2 DIABETES MELLITUS WITHOUT COMPLICATION, WITH LONG-TERM CURRENT USE OF INSULIN (HCC): ICD-10-CM

## 2021-05-10 RX ORDER — DULAGLUTIDE 0.75 MG/.5ML
0.75 INJECTION, SOLUTION SUBCUTANEOUS WEEKLY
Qty: 4 PEN | Refills: 0 | Status: SHIPPED | OUTPATIENT
Start: 2021-05-10 | End: 2021-05-26 | Stop reason: DRUGHIGH

## 2021-05-10 NOTE — TELEPHONE ENCOUNTER
Next Visit Date:  Future Appointments   Date Time Provider Don Oliverosi   5/20/2021  1:00 PM Chacha Manrique MD [facility] CANCER Richy Lopezer   5/26/2021 11:45 AM Diane Howe MD \[facility] [place] [facility]"" [facility]   6/7/2021  2:15 PM Douglas Monet MD [facility] [address] Maintenance   Topic Date Due    Shingles Vaccine (1 of 2) 08/20/2021 (Originally 8/17/2007)    Diabetic foot exam  07/06/2021    A1C test (Diabetic or Prediabetic)  07/14/2021    Diabetic retinal exam  08/21/2021    Low dose CT lung screening  10/28/2021    Diabetic microalbuminuria test  11/30/2021    Breast cancer screen  01/13/2022    Lipid screen  04/27/2022    Potassium monitoring  04/27/2022    Creatinine monitoring  04/27/2022    Colon cancer screen colonoscopy  10/26/2023    Cervical cancer screen  07/06/2025    DTaP/Tdap/Td vaccine (2 - Td) 08/20/2030    Flu vaccine  Completed    Pneumococcal 0-64 years Vaccine  Completed    COVID-19 Vaccine  Completed    Hepatitis C screen  Completed    HIV screen  Completed    Hepatitis A vaccine  Aged Out    Hib vaccine  Aged Out    Meningococcal (ACWY) vaccine  Aged Out       Hemoglobin A1C (%)   Date Value   04/14/2021 10.9   11/30/2020 12.9   07/06/2020 14.0             ( goal A1C is < 7)   Microalb/Crt.  Ratio (mcg/mg creat)   Date Value   11/30/2020 CANNOT BE CALCULATED     LDL Cholesterol (mg/dL)   Date Value   04/27/2021 108   02/18/2020 140 (H)       (goal LDL is <100)   AST (U/L)   Date Value   04/27/2021 18     ALT (U/L)   Date Value   04/27/2021 21     BUN (mg/dL)   Date Value   04/27/2021 9     BP Readings from Last 3 Encounters:   04/14/21 116/70   03/25/21 133/77   03/03/21 130/74          (goal 120/80)    All Future Testing planned in [facility]  Lab Frequency Next Occurrence   COVID-19 Once 12/09/2020               Patient Active Problem List:     Essential hypertension     Muscle cramps     Type 2 diabetes mellitus without complication (HCC)     Elevated lipoprotein(a)     Severe obesity (BMI >= 40) (HCC)     Ductal carcinoma in situ (DCIS) of left breast

## 2021-05-12 ENCOUNTER — TELEPHONE (OUTPATIENT)
Dept: FAMILY MEDICINE CLINIC | Age: 66
End: 2021-05-12

## 2021-05-12 NOTE — TELEPHONE ENCOUNTER
Patient stated that she does use this pharmacy for diabetic supplies    [facility]

## 2021-05-17 DIAGNOSIS — D05.12 DUCTAL CARCINOMA IN SITU (DCIS) OF LEFT BREAST: Primary | ICD-10-CM

## 2021-05-20 ENCOUNTER — TELEPHONE (OUTPATIENT)
Dept: ONCOLOGY | Age: 66
End: 2021-05-20

## 2021-05-20 ENCOUNTER — OFFICE VISIT (OUTPATIENT)
Dept: ONCOLOGY | Age: 66
End: 2021-05-20
Payer: COMMERCIAL

## 2021-05-20 ENCOUNTER — HOSPITAL ENCOUNTER (OUTPATIENT)
Age: 66
Discharge: HOME | End: 2021-05-20
Payer: COMMERCIAL

## 2021-05-20 VITALS
SYSTOLIC BLOOD PRESSURE: 113 MMHG | TEMPERATURE: 95.3 F | DIASTOLIC BLOOD PRESSURE: 75 MMHG | WEIGHT: 267 LBS | HEART RATE: 87 BPM | BODY MASS INDEX: 43.09 KG/M2

## 2021-05-20 DIAGNOSIS — D05.12 DUCTAL CARCINOMA IN SITU (DCIS) OF LEFT BREAST: Chronic | ICD-10-CM

## 2021-05-20 DIAGNOSIS — C50.912 MALIGNANT NEOPLASM OF LEFT FEMALE BREAST, UNSPECIFIED ESTROGEN RECEPTOR STATUS, UNSPECIFIED SITE OF BREAST (HCC): Primary | ICD-10-CM

## 2021-05-20 DIAGNOSIS — D05.12 DUCTAL CARCINOMA IN SITU (DCIS) OF LEFT BREAST: ICD-10-CM

## 2021-05-20 LAB
ABSOLUTE EOS #: 0.2 K/UL (ref 0–0.4)
ABSOLUTE IMMATURE GRANULOCYTE: ABNORMAL K/UL (ref 0–0.3)
ABSOLUTE LYMPH #: 2.1 K/UL (ref 1–4.8)
ABSOLUTE MONO #: 0.7 K/UL (ref 0.1–1.2)
ALBUMIN SERPL-MCNC: 4.4 G/DL (ref 3.5–5.2)
ALBUMIN/GLOBULIN RATIO: 1.6 (ref 1–2.5)
ALP BLD-CCNC: 90 U/L (ref 35–104)
ALT SERPL-CCNC: 24 U/L (ref 5–33)
ANION GAP SERPL CALCULATED.3IONS-SCNC: 12 MMOL/L (ref 9–17)
AST SERPL-CCNC: 23 U/L
BASOPHILS # BLD: 1 % (ref 0–2)
BASOPHILS ABSOLUTE: 0.1 K/UL (ref 0–0.2)
BILIRUB SERPL-MCNC: 0.34 MG/DL (ref 0.3–1.2)
BUN BLDV-MCNC: 13 MG/DL (ref 8–23)
BUN/CREAT BLD: ABNORMAL (ref 9–20)
CALCIUM SERPL-MCNC: 9.6 MG/DL (ref 8.6–10.4)
CHLORIDE BLD-SCNC: 101 MMOL/L (ref 98–107)
CO2: 25 MMOL/L (ref 20–31)
CREAT SERPL-MCNC: 0.8 MG/DL (ref 0.5–0.9)
DIFFERENTIAL TYPE: ABNORMAL
EOSINOPHILS RELATIVE PERCENT: 2 % (ref 1–4)
GFR AFRICAN AMERICAN: >60 ML/MIN
GFR NON-AFRICAN AMERICAN: >60 ML/MIN
GFR SERPL CREATININE-BSD FRML MDRD: ABNORMAL ML/MIN/{1.73_M2}
GFR SERPL CREATININE-BSD FRML MDRD: ABNORMAL ML/MIN/{1.73_M2}
GLUCOSE BLD-MCNC: 251 MG/DL (ref 70–99)
HCT VFR BLD CALC: 41.5 % (ref 36–46)
HEMOGLOBIN: 14 G/DL (ref 12–16)
IMMATURE GRANULOCYTES: ABNORMAL %
LYMPHOCYTES # BLD: 23 % (ref 24–44)
MCH RBC QN AUTO: 30.8 PG (ref 26–34)
MCHC RBC AUTO-ENTMCNC: 33.7 G/DL (ref 31–37)
MCV RBC AUTO: 91.5 FL (ref 80–100)
MONOCYTES # BLD: 8 % (ref 2–11)
NRBC AUTOMATED: ABNORMAL PER 100 WBC
PDW BLD-RTO: 13.3 % (ref 12.5–15.4)
PLATELET # BLD: 244 K/UL (ref 140–450)
PLATELET ESTIMATE: ABNORMAL
PMV BLD AUTO: 7.6 FL (ref 6–12)
POTASSIUM SERPL-SCNC: 4.5 MMOL/L (ref 3.7–5.3)
RBC # BLD: 4.53 M/UL (ref 4–5.2)
RBC # BLD: ABNORMAL 10*6/UL
SEG NEUTROPHILS: 66 % (ref 36–66)
SEGMENTED NEUTROPHILS ABSOLUTE COUNT: 6 K/UL (ref 1.8–7.7)
SODIUM BLD-SCNC: 138 MMOL/L (ref 135–144)
TOTAL PROTEIN: 7.1 G/DL (ref 6.4–8.3)
WBC # BLD: 9.1 K/UL (ref 3.5–11)
WBC # BLD: ABNORMAL 10*3/UL

## 2021-05-20 PROCEDURE — 80053 COMPREHEN METABOLIC PANEL: CPT

## 2021-05-20 PROCEDURE — 85025 COMPLETE CBC W/AUTO DIFF WBC: CPT

## 2021-05-20 PROCEDURE — 36415 COLL VENOUS BLD VENIPUNCTURE: CPT

## 2021-05-20 PROCEDURE — 99211 OFF/OP EST MAY X REQ PHY/QHP: CPT | Performed by: INTERNAL MEDICINE

## 2021-05-20 PROCEDURE — 99214 OFFICE O/P EST MOD 30 MIN: CPT | Performed by: INTERNAL MEDICINE

## 2021-05-20 NOTE — PROGRESS NOTES
DIAGNOSIS:   1. DCIS of the left breast, intermediate grade, ER/NY positive, diagnosed November/2020  CURRENT THERAPY:  1. Underwent lumpectomy 12/15/2020  2. Radiation completed 03/11/2021  3. Plan for Anastrozole  BRIEF CASE HISTORY:   Yoanna Hawkins is a very pleasant 63 y.o. female who is referred to us for management recommendation of her recently diagnosed DCIS. She recently moved from [place] and she had a screening mammogram 9/2020 that showed indeterminate calcification in the left breast.  Diagnostic mammogram followed on 10/28/2020 and showed abnormal pleomorphic calcification at the 6 o'clock position of the left breast.  Biopsy showed intermediate grade ductal carcinoma in situ that was ER/NY positive. No invasive disease appreciated. She is sent to us for a consultation, she was already seen by Dr. Carlos Bauer and plans are for surgery next week. The patient feels well, she denies any chest pain or shortness of breath, denies any bone pain. She is in relatively good health but due to stress, her diabetes has been poorly controlled. She has a previous history of deep venous thrombosis. Her mother had breast cancer in her 76s. No other malignancy in the family. She has not taken any exogenous estrogens, she is postmenopausal.   She underwent lumpectomy on 12/15/2020 and pathology showed ductal carcinoma in situ. She needed additional margin removed and the margin showed rare atypical hyperplastic cells but no malignancy. Again the DCIS was ER/NY positive. She completed radiation 03/11/2021 and we plan to start anastrozole. INTERIM HISTORY: The patient presents today for follow up for breast cancer recommendations. She has been taking Anastrozole and reports back pain with standing and walking making activity difficult, she is unsure if it's side effect. She has had some mild nausea, no vomiting, and initial diarrhea resolved. She did have 2 episodes of what felt like menstrual cramping. She has not received follow up from [facility]. PAST MEDICAL HISTORY: has a past medical history of Breast cancer ([facility] [address] Diabetes mellitus ([facility] [address] Emphysema (subcutaneous) (surgical) resulting from a procedure, Hx of blood clots, Hypertension, Kidney calculi, Neuropathy, DIONI on CPAP, Prolonged emergence from general anesthesia, and Sleep apnea. PAST SURGICAL HISTORY: has a past surgical history that includes other surgical history; Cholecystectomy; Knee arthroscopy (Left, 1995); Tonsillectomy; Colonoscopy; Colonoscopy (10/26/2020); Colonoscopy (N/A, 10/26/2020); HARI STEREO BREAST BX W LOC DEVICE 1ST LESION LEFT (11/10/2020); Endoscopy, colon, diagnostic; US BIOPSY THYROID; and Breast biopsy (Left, 12/15/2020). CURRENT MEDICATIONS:  has a current medication list which includes the following prescription(s): trulicity, anastrozole, tresiba flextouch, metformin, insulin pen needle, atenolol, pantoprazole, multiple vitamin, amlodipine, aspirin, and krill oil. ALLERGIES:  is allergic to augmentin [amoxicillin-pot clavulanate], bactrim [sulfamethoxazole-trimethoprim], vancomycin, and dolobid [diflunisal]. FAMILY HISTORY: family history includes Alzheimer's Disease in her mother; Breast Cancer in her mother; Diabetes type 2  in her mother; Heart Defect in her mother; Heart Disease in her father; Other in her father. SOCIAL HISTORY:  reports that she quit smoking about 1 years ago. She has a 88.00 pack-year smoking history. She has never used smokeless tobacco. She reports previous alcohol use. She reports that she does not use drugs. REVIEW OF SYSTEMS:   General: No fever or night sweats. Weight gain. +insomnia secondary to anxiety  ENT: No double or blurred vision, no tinnitus or hearing problem, no dysphagia or sore throat   Respiratory: No chest pain, no shortness of breath, no cough or hemoptysis. Cardiovascular: Denies chest pain, PND or orthopnea.  No L E swelling or palpitations. Gastrointestinal: No vomiting or constipation. +lower abd cramping, diarrhea, nausea  Genitourinary: Denies dysuria, hematuria, frequency, urgency or incontinence. Neurological: Denies headaches, decreased LOC, no sensory or motor focal deficits. Musculoskeletal: No arthralgia or joint swelling. +back pain  Skin: There are no rashes or bleeding. Psychiatric: + anxiety, no depression. Endocrine: Diabetes relatively poorly controlled lately. Hematologic: No bleeding, no adenopathy. PHYSICAL EXAM: Shows a well appearing 63y.o.-year-old female who is not in pain or distress. Vital Signs: Blood pressure 113/75, pulse 87, temperature 95.3 °F (35.2 °C), temperature source Oral, weight 267 lb (121.1 kg). HEENT: Normocephalic and atraumatic. Pupils are equal, round, reactive to light and accommodation. Extraocular muscles are intact. Neck: Showed no JVD, no carotid bruit. Lungs: Clear to auscultation bilaterally. Heart: Regular without any murmur. Abdomen: Soft, nontender. No hepatosplenomegaly. Extremities: Lower extremities show no edema, clubbing, or cyanosis. Breasts: Examination showed a well healed lumpectomy, mild redness around the nipple - no skin break down. No clear mass or adenopathy appreciated. Right breast is free of any masses or adenopathy.   Neuro exam: intact cranial nerves bilaterally no motor or sensory deficit, gait is normal. Lymphatic: no adenopathy appreciated in the supraclavicular, axillary, cervical or inguinal area    REVIEW OF LABORATORY DATA:   Lab Results   Component Value Date    WBC 9.1 05/20/2021    HGB 14.0 05/20/2021    HCT 41.5 05/20/2021    MCV 91.5 05/20/2021     05/20/2021       Chemistry        Component Value Date/Time     05/20/2021 1257    K 4.5 05/20/2021 1257     05/20/2021 1257    CO2 25 05/20/2021 1257    BUN 13 05/20/2021 1257    CREATININE 0.80 05/20/2021 1257        Component Value Date/Time    CALCIUM 9.6 05/20/2021 1257

## 2021-05-26 ENCOUNTER — HOSPITAL ENCOUNTER (OUTPATIENT)
Facility: CLINIC | Age: 66
Discharge: HOME | End: 2021-05-28
Payer: COMMERCIAL

## 2021-05-26 ENCOUNTER — OFFICE VISIT (OUTPATIENT)
Dept: FAMILY MEDICINE CLINIC | Age: 66
End: 2021-05-26
Payer: COMMERCIAL

## 2021-05-26 ENCOUNTER — HOSPITAL ENCOUNTER (OUTPATIENT)
Dept: GENERAL RADIOLOGY | Facility: CLINIC | Age: 66
Discharge: HOME | End: 2021-05-28
Payer: COMMERCIAL

## 2021-05-26 VITALS
BODY MASS INDEX: 43.07 KG/M2 | TEMPERATURE: 97.6 F | HEIGHT: 66 IN | WEIGHT: 268 LBS | HEART RATE: 83 BPM | SYSTOLIC BLOOD PRESSURE: 118 MMHG | OXYGEN SATURATION: 97 % | DIASTOLIC BLOOD PRESSURE: 74 MMHG

## 2021-05-26 DIAGNOSIS — I10 ESSENTIAL HYPERTENSION: ICD-10-CM

## 2021-05-26 DIAGNOSIS — M54.50 CHRONIC BILATERAL LOW BACK PAIN WITHOUT SCIATICA: ICD-10-CM

## 2021-05-26 DIAGNOSIS — Z79.4 TYPE 2 DIABETES MELLITUS WITHOUT COMPLICATION, WITH LONG-TERM CURRENT USE OF INSULIN (HCC): Primary | ICD-10-CM

## 2021-05-26 DIAGNOSIS — G89.29 CHRONIC BILATERAL LOW BACK PAIN WITHOUT SCIATICA: ICD-10-CM

## 2021-05-26 DIAGNOSIS — E11.9 TYPE 2 DIABETES MELLITUS WITHOUT COMPLICATION, WITH LONG-TERM CURRENT USE OF INSULIN (HCC): Primary | ICD-10-CM

## 2021-05-26 DIAGNOSIS — D05.12 DUCTAL CARCINOMA IN SITU (DCIS) OF LEFT BREAST: ICD-10-CM

## 2021-05-26 PROCEDURE — 99214 OFFICE O/P EST MOD 30 MIN: CPT | Performed by: FAMILY MEDICINE

## 2021-05-26 PROCEDURE — 72100 X-RAY EXAM L-S SPINE 2/3 VWS: CPT

## 2021-05-26 RX ORDER — DULAGLUTIDE 1.5 MG/.5ML
1.5 INJECTION, SOLUTION SUBCUTANEOUS WEEKLY
Qty: 4 PEN | Refills: 2 | Status: SHIPPED | OUTPATIENT
Start: 2021-05-26 | End: 2021-07-07 | Stop reason: DRUGHIGH

## 2021-05-26 SDOH — ECONOMIC STABILITY: FOOD INSECURITY: WITHIN THE PAST 12 MONTHS, YOU WORRIED THAT YOUR FOOD WOULD RUN OUT BEFORE YOU GOT MONEY TO BUY MORE.: NEVER TRUE

## 2021-05-26 SDOH — ECONOMIC STABILITY: FOOD INSECURITY: WITHIN THE PAST 12 MONTHS, THE FOOD YOU BOUGHT JUST DIDN'T LAST AND YOU DIDN'T HAVE MONEY TO GET MORE.: NEVER TRUE

## 2021-05-26 ASSESSMENT — SOCIAL DETERMINANTS OF HEALTH (SDOH): HOW HARD IS IT FOR YOU TO PAY FOR THE VERY BASICS LIKE FOOD, HOUSING, MEDICAL CARE, AND HEATING?: NOT HARD AT ALL

## 2021-05-26 NOTE — PROGRESS NOTES
[address] PRIMARY CARE  [address] [place] [address]  Dept: [phone]  Dept Fax: [phone]    Eleanor Perdomo is a 63 y.o. female who is a Established patient, who presents today for her medical conditions/complaints as noted below:  Chief Complaint   Patient presents with    Follow-up     6 week    Diabetes         HPI:     The patient is a 65-year-old female with a past medical history significant for diabetes who presents for 6-week follow-up on her diabetes due to medication changes. At the last visit we changed the patient off of Actos and added Trulicity after discussing options with the patient. She is still on insulin as well. The patient began Trulicity 7/45 and since that time the blood sugars have been going down. The blood sugars are between 130-210. The patient has some significant side effects every Sunday after giving herself the Trulicity dose. The blood sugars are routinely elevated on Sundays when she takes the trulicity. The patient has been having lower back pain that is very severe. She is unsure why. She has to sit down when doing dishes 3-4 times while standing at the sink. The pain lasts 3-4 minutes when she tries to rest.  She denies any leg cramping, numbness or tingling in her legs, changes in her skin on her legs, hair loss on the skin on the legs. Diabetes  She presents for her follow-up diabetic visit. She has type 2 diabetes mellitus. Her disease course has been improving. There are no hypoglycemic associated symptoms. There are no diabetic associated symptoms. There are no hypoglycemic complications. Symptoms are stable. Risk factors for coronary artery disease include diabetes mellitus, dyslipidemia, obesity, post-menopausal, sedentary lifestyle, hypertension and family history. Current diabetic treatment includes oral agent (dual therapy). She is compliant with treatment all of the time.  She is following a generally healthy diet. Meal planning includes avoidance of concentrated sweets. She has not had a previous visit with a dietitian. She rarely participates in exercise. Her breakfast blood glucose range is generally 110-130 mg/dl. An ACE inhibitor/angiotensin II receptor blocker is not being taken. She does not see a podiatrist.Eye exam is current. Hemoglobin A1C (%)   Date Value   04/14/2021 10.9   11/30/2020 12.9   07/06/2020 14.0             ( goal A1Cis < 7)   Microalb/Crt.  Ratio (mcg/mg creat)   Date Value   11/30/2020 CANNOT BE CALCULATED     LDL Cholesterol (mg/dL)   Date Value   04/27/2021 108   02/18/2020 140 (H)       (goal LDL is <100)   AST (U/L)   Date Value   05/20/2021 23     ALT (U/L)   Date Value   05/20/2021 24     BUN (mg/dL)   Date Value   05/20/2021 13     BP Readings from Last 3 Encounters:   05/26/21 118/74   05/20/21 113/75   04/14/21 116/70          (goal 120/80)    Past Medical History:   Diagnosis Date    Breast cancer ([address]     left breast    Diabetes mellitus ([address]     Emphysema (subcutaneous) (surgical) resulting from a procedure     Hx of blood clots     left leg    Hypertension     Kidney calculi     passed on her own    Neuropathy     DIONI on CPAP     uses nightly    Prolonged emergence from general anesthesia     Sleep apnea       Past Surgical History:   Procedure Laterality Date    BREAST BIOPSY Left 12/15/2020    LEFT BREAST LUMPECTOMY WITH   NEEDLE LOCALIZATION (11:30) [facility]       removed polyps    COLONOSCOPY  10/26/2020    with Biopsy    COLONOSCOPY N/A 10/26/2020    COLONOSCOPY POLYPECTOMY  BIOPSY performed by Jojo [facility] at [facility], COLON, DIAGNOSTIC      KNEE ARTHROSCOPY Left 1995    HARI STEROTACTIC LOC BREAST BIOPSY LEFT  11/10/2020    HARI STEROTACTIC LOC BREAST BIOPSY LEFT 11/10/2020 [facility]    OTHER SURGICAL HISTORY      DNC for post menapausal bleeding    TONSILLECTOMY      US THYROID BIOPSY         Family History   Problem Relation Age of Onset    Breast Cancer Mother     Heart Defect Mother         Murmur     Alzheimer's Disease Mother     Diabetes type 2  Mother     Heart Disease Father     Other Father         AA       Social History     Tobacco Use    Smoking status: Former Smoker     Packs/day: 2.00     Years: 44.00     Pack years: 88.00     Quit date: 2019     Years since quittin.9    Smokeless tobacco: Never Used   Substance Use Topics    Alcohol use: Not Currently      Current Outpatient Medications   Medication Sig Dispense Refill    Dulaglutide (TRULICITY) 1.5 XY/7.8GY SOPN Inject 1.5 mg into the skin once a week 4 pen 2    Insulin Degludec (TRESIBA FLEXTOUCH) 200 UNIT/ML SOPN INJECT 26 UNITS INTO THE SKIN DAILY (Patient taking differently: INJECT 20 UNITS INTO THE SKIN DAILY) 2 pen 2    metFORMIN (GLUCOPHAGE) 500 MG tablet TAKE 1 TABLET BY MOUTH TWICE A DAY WITH MEALS 180 tablet 1    Insulin Pen Needle 32G X 4 MM MISC 1 each by Does not apply route daily 100 each 3    atenolol (TENORMIN) 50 MG tablet TAKE 1 TABLET BY MOUTH EVERY DAY 90 tablet 1    pantoprazole (PROTONIX) 40 MG tablet TAKE 1 TABLET BY MOUTH EVERY DAY 90 tablet 1    Multiple Vitamin (MULTI-VITAMIN DAILY PO) Take by mouth daily      amLODIPine (NORVASC) 5 MG tablet Take 1 tablet by mouth daily 90 tablet 1    aspirin 81 MG tablet Take 81 mg by mouth daily      Krill Oil 350 MG CAPS Take 500 mg by mouth       anastrozole (ARIMIDEX) 1 MG tablet Take 1 tablet by mouth daily 30 tablet 6     No current facility-administered medications for this visit.      Allergies   Allergen Reactions    Augmentin [Amoxicillin-Pot Clavulanate] Nausea And Vomiting    Bactrim [Sulfamethoxazole-Trimethoprim] Nausea And Vomiting    Vancomycin Rash    Dolobid [Diflunisal] Nausea And Vomiting       Health Maintenance   Topic Date Due    Shingles Vaccine (1 of 2) 2021 (Originally 8/17/2007)    Diabetic foot exam  07/06/2021    A1C test (Diabetic or Prediabetic)  07/14/2021    Diabetic retinal exam  08/21/2021    Low dose CT lung screening  10/28/2021    Diabetic microalbuminuria test  11/30/2021    Breast cancer screen  01/13/2022    Lipid screen  04/27/2022    Potassium monitoring  05/20/2022    Creatinine monitoring  05/20/2022    Pneumococcal 0-64 years Vaccine (2 of 2) 08/17/2022    Colon cancer screen colonoscopy  10/26/2023    Cervical cancer screen  07/06/2025    DTaP/Tdap/Td vaccine (2 - Td) 08/20/2030    Flu vaccine  Completed    COVID-19 Vaccine  Completed    Hepatitis C screen  Completed    HIV screen  Completed    Hepatitis A vaccine  Aged Out    Hib vaccine  Aged Out    Meningococcal (ACWY) vaccine  Aged Out       Subjective:     Review of Systems    Objective:     Physical Exam  Vitals reviewed. Constitutional:       General: She is awake. She is not in acute distress. Appearance: Normal appearance. She is obese. She is not ill-appearing, toxic-appearing or diaphoretic. HENT:      Head: Normocephalic and atraumatic. Right Ear: External ear normal.      Left Ear: External ear normal.      Nose: Nose normal.   Eyes:      Extraocular Movements: Extraocular movements intact. Conjunctiva/sclera: Conjunctivae normal.   Cardiovascular:      Rate and Rhythm: Normal rate and regular rhythm. Pulses: Normal pulses. Heart sounds: Normal heart sounds. No murmur heard. No friction rub. No gallop. Pulmonary:      Effort: Pulmonary effort is normal. No respiratory distress. Breath sounds: Normal breath sounds. No stridor. No wheezing, rhonchi or rales. Abdominal:      General: Abdomen is flat. Bowel sounds are normal.      Palpations: Abdomen is soft. Musculoskeletal:         General: Normal range of motion. Cervical back: Normal range of motion.    Skin:     Capillary Refill: Capillary refill takes less than 2 seconds. Neurological:      General: No focal deficit present. Mental Status: She is alert and oriented to person, place, and time. Mental status is at baseline. Psychiatric:         Attention and Perception: Attention normal.         Mood and Affect: Mood and affect normal.         Speech: Speech normal.         Behavior: Behavior normal.         Thought Content: Thought content normal.         Judgment: Judgment normal.       /74   Pulse 83   Temp 97.6 °F (36.4 °C)   Ht 5' 6\" (1.676 m)   Wt 268 lb (121.6 kg)   SpO2 97%   BMI 43.26 kg/m²     Assessment:       Diagnosis Orders   1. Type 2 diabetes mellitus without complication, with long-term current use of insulin ([facility])  Dulaglutide (TRULICITY) 1.5 HK/5.7OH SOPN   2. Ductal carcinoma in situ (DCIS) of left breast  External Referral To Hematology Oncology   3. Chronic bilateral low back pain without sciatica  XR LUMBAR SPINE (2-3 VIEWS)    [facility] - [place]   4. Essential hypertension               Plan:    Diabetes-discussed with the patient increasing her Trulicity. We did discuss that the side effects could get worse but the patient would like to continue with an increased dose due to her sugars improving significantly on the Trulicity and her desire to get off insulin eventually. I did discuss the importance of diet as well as exercise to help with control of her blood sugar. Patient will return in 6 weeks at which time we will get her A1c to assess medication efficacy. DCIS-patient is following with hematology oncology as well as radiation oncology. Chronic low back pain without sciatica-discussed with patient different treatment options including physical therapy, Tylenol/ibuprofen, stretches, ice heat. Will order an x-ray to assess for any bony abnormalities today. If the pain increases the patient is to return. She has no signs or symptoms of UTI. We will monitor closely.     Hypertension-blood pressure well controlled today. Fasting labs to be done every 6 months. Return in about 6 weeks (around 7/7/2021) for diabetes w/ a1c+. Orders Placed This Encounter   Procedures    XR LUMBAR SPINE (2-3 VIEWS)     Standing Status:   Future     Number of Occurrences:   1     Standing Expiration Date:   5/26/2022     Order Specific Question:   Reason for exam:     Answer:   low back pain, on SERM therapy for h/o DCIS    External Referral To Hematology Oncology     Referral Priority:   Routine     Referral Type:   Eval and Treat     Referral Reason:   Specialty Services Required     Referred to Provider:   Daphne Samuels MD     Requested Specialty:   Hematology and Oncology     Number of Visits Requested:   [facility]     Referral Priority:   Routine     Referral Type:   Eval and Treat     Referral Reason:   Specialty Services Required     Requested Specialty:   Physical Therapy     Number of Visits Requested:   1     Orders Placed This Encounter   Medications    Dulaglutide (TRULICITY) 1.5 KU/4.8BT SOPN     Sig: Inject 1.5 mg into the skin once a week     Dispense:  4 pen     Refill:  2       Patient given educational materials - see patient instructions. Discussed use, benefit, and side effects of prescribed medications. All patientquestions answered. Pt voiced understanding. Reviewed health maintenance. Instructedto continue current medications, diet and exercise. Patient agreed with treatmentplan. Follow up as directed.      Electronically signed by Anthony Bueno MD on 5/31/2021 at 12:00 PM

## 2021-06-07 ENCOUNTER — HOSPITAL ENCOUNTER (OUTPATIENT)
Dept: RADIATION ONCOLOGY | Age: 66
Discharge: HOME | End: 2021-06-07
Attending: RADIOLOGY
Payer: COMMERCIAL

## 2021-06-07 VITALS
TEMPERATURE: 96.6 F | HEART RATE: 69 BPM | OXYGEN SATURATION: 96 % | DIASTOLIC BLOOD PRESSURE: 85 MMHG | WEIGHT: 269.5 LBS | RESPIRATION RATE: 15 BRPM | SYSTOLIC BLOOD PRESSURE: 135 MMHG | BODY MASS INDEX: 43.5 KG/M2

## 2021-06-07 DIAGNOSIS — D05.12 DUCTAL CARCINOMA IN SITU (DCIS) OF LEFT BREAST: Primary | Chronic | ICD-10-CM

## 2021-06-07 PROCEDURE — 99212 OFFICE O/P EST SF 10 MIN: CPT | Performed by: RADIOLOGY

## 2021-06-07 PROCEDURE — 99213 OFFICE O/P EST LOW 20 MIN: CPT | Performed by: RADIOLOGY

## 2021-06-07 ASSESSMENT — PAIN DESCRIPTION - PAIN TYPE: TYPE: CHRONIC PAIN

## 2021-06-07 ASSESSMENT — PAIN SCALES - GENERAL: PAINLEVEL_OUTOF10: 7

## 2021-06-07 NOTE — PROGRESS NOTES
Xander Ricks  6/7/2021  2:40 PM    Pt here for follow up visit. Patient states pain in her left breast about a 5, but also has lateral pain. Dr. Maria Victoria Morrissey updated. Patient finished radiation in March. Patient had to stop Anastrozole. DR. Maria Victoria Morrissey to [facility]. 3 month follow up visit made.   Vitals:    06/07/21 1435   BP: 135/85   Pulse: 69   Resp: 15   Temp: 96.6 °F (35.9 °C)   SpO2: 96%    :  Patient Currently in Pain: Yes  Pain Assessment: 0-10  Pain Level: 7       Wt Readings from Last 1 Encounters:   06/07/21 269 lb 8 oz (122.2 kg)                Current Outpatient Medications:     Dulaglutide (TRULICITY) 1.5 NS/3.5CT SOPN, Inject 1.5 mg into the skin once a week, Disp: 4 pen, Rfl: 2    anastrozole (ARIMIDEX) 1 MG tablet, Take 1 tablet by mouth daily, Disp: 30 tablet, Rfl: 6    Insulin Degludec (TRESIBA FLEXTOUCH) 200 UNIT/ML SOPN, INJECT 26 UNITS INTO THE SKIN DAILY (Patient taking differently: INJECT 20 UNITS INTO THE SKIN DAILY), Disp: 2 pen, Rfl: 2    metFORMIN (GLUCOPHAGE) 500 MG tablet, TAKE 1 TABLET BY MOUTH TWICE A DAY WITH MEALS, Disp: 180 tablet, Rfl: 1    Insulin Pen Needle 32G X 4 MM MISC, 1 each by Does not apply route daily, Disp: 100 each, Rfl: 3    atenolol (TENORMIN) 50 MG tablet, TAKE 1 TABLET BY MOUTH EVERY DAY, Disp: 90 tablet, Rfl: 1    pantoprazole (PROTONIX) 40 MG tablet, TAKE 1 TABLET BY MOUTH EVERY DAY, Disp: 90 tablet, Rfl: 1    Multiple Vitamin (MULTI-VITAMIN DAILY PO), Take by mouth daily, Disp: , Rfl:     amLODIPine (NORVASC) 5 MG tablet, Take 1 tablet by mouth daily, Disp: 90 tablet, Rfl: 1    aspirin 81 MG tablet, Take 81 mg by mouth daily, Disp: , Rfl:     Krill Oil 350 MG CAPS, Take 500 mg by mouth , Disp: , Rfl:     Immunizations:    Influenza status:    [x]   Current   []   Patient declined    Pneumococcal status:  [x]   Current  []   Patient declined  Covid status:   [x]  Dose #1:                     [x]  Dose #2:               []   Patient declined    Smoking Status:    [] Smoker - PPD:   [x] Nonsmoker - Quit Date: 2019              [] Never a smoker      Cancer Screening:  Colonoscopy   [] Current       [] Not current   [] Not current, but scheduled   [x] NA  Mammogram   [x] Current       [] Not current   [] Not current, but scheduled   [] NA  Prostate           [] Current       [] Not current   [] Not current, but scheduled   [x] NA  PAP/Pelvic      [] Current       [] Not current   [] Not current, but scheduled   [x] NA  Skin                 [] Current       [] Not current   [] Not current, but scheduled   [x] NA     Hormone:  Lupron []   Last dose given:           Next dose due:   Eligard []   Last dose given:           Next dose due:   Aromatase Inhibitors []   Medication name:   N/A:  [x]             *BREAST Patient only:    Lymphedema Eval:   [] left arm      [] right arm  Location:     Measurement (cm)    Upper Bicep :    Lower Bicep :         FALLS RISK SCREEN  Instructions:  Assess the patient and enter the appropriate indicators that are present for fall risk identification. Total the numbers entered and assign a fall risk score from Table 2.  Reassess patient at a minimum every 12 weeks or with status change. Assessment   Date  6/7/2021     1. Mental Ability: confusion/cognitively impaired 0     2. Elimination Issues: incontinence, frequency 0       3. Ambulatory: use of assistive devices (walker, cane, off-loading devices),        attached to equipment (IV pole, oxygen) 0     4. Sensory Limitations: dizziness, vertigo, impaired vision 0     5. Age less than 65        0     6. Age 72 or greater 0     7. Medication: diuretics, strong analgesics, hypnotics, sedatives,        antihypertensive agents 0   8. Falls:  recent history of falls within the last 3 months (not to include slipping or        tripping) 0   TOTAL 0    If score of 4 or greater was education given?  No           TABLE 2   Risk Score Risk Level Plan of Care   0-3 Little or  No Risk

## 2021-06-07 NOTE — PROGRESS NOTES
[facility]            Radiation Oncology          [address] [facility]          [address]        Jacquie Amaya: [phone]        F: [phone]       [email or web address]         Date of Service: 2021     Location:  [facility]  Anatoliy Jose,   [address] [facility] [place]   [phone]        RADIATION ONCOLOGY FOLLOW UP NOTE    Patient ID:   Sneha Tamayo  : 1955   MRN: [id number]    DIAGNOSIS:  Cancer Staging  Ductal carcinoma in situ (DCIS) of left breast  Staging form: Breast, AJCC 8th Edition  - Pathologic stage from 11/10/2020: Stage 0 (pTis (DCIS), pN0, cM0, G2, ER+, AR+, HER2: Not Assessed) - Signed by Michelle Mckeon MD on 2021        INTERVAL HISTORY:   Sneha Tamayo is a 63 y.o. female. 2020 -bilateral screening mammograms  There are scattered bilateral fibroglandular densities. Jessica Mettle is a small   cluster of indeterminate calcifications seen in the middle depth of the left   inferior retroareolar breast. Jessica Mettle is a suspicion of a 3 mm vaguely   spiculated mammographic asymmetry seen in the middle depth of the left   superolateral breast.       No right breast findings     November 10, 2020 -breast biopsy  Left breast, 6:00, biopsy:           Ductal carcinoma in situ, intermediate grade. Microcalcifications present. Estrogen receptor, positive (>95%). Progestin receptor, positive (>95%). Clinical Information   Pre-op Diagnosis:  LEFT BREAST CALCIFICATIONS   Operative Findings:  LEFT 6:00   Operation Performed:  STEREOTACTIC LEFT BREAST BIOPSY       December 15, 2020 -left breast needle localized lumpectomy  -- Diagnosis --   1.  BREAST TISSUES, EXCISION, WITH EXAMINATION OF MARGINS (LEFT;   ADDITIONAL INFERIOR POSTERIOR MARGIN):        -RARE FOCUS OF ATYPICAL DUCTAL HYPERPLASIA.      -NO IN SITU OR INVASIVE NEOPLASM IDENTIFIED (MARGINS NEGATIVE).      -SCLEROSING ADENOSIS WITH CALCIFICATION. N/A     MARGINS -   - SITE(S) OF ALL POSITIVE MARGINS: N/A   - IF ALL NEGATIVE--   --SITE(S) AND DISTANCE TO CLOSEST: N/A     REGIONAL LYMPH NODES--       - NUMBER SENTINEL NODES:     0   - NUMBER SENTINEL + OTHER NODES: 0       - NUMBER WITH MACROMETASTASES: N/A       - NUMBER WITH MICROMETASTASES:     N/A   - NUMBER WITH ISOLATED TUMOR CELLS: N/A       - SIZE OF LARGEST METASTATIC DEPOSIT: N/A       - EXTRANODAL EXTENSION:      N/A     PATHOLOGIC STAGE CLASSIFICATION (pTNM):     pTis pN   (T and /or N descriptors used only if applicable)     March 4, 2021 - Completed postoperative radiation therapy to the left breast, 42.5 Gy in 15 fractions. She had expected fatigue and a mild skin reaction. She returns today for a brief toxicity evaluation.       MEDICATIONS:    Current Outpatient Medications:     Dulaglutide (TRULICITY) 1.5 RU/8.2IV SOPN, Inject 1.5 mg into the skin once a week, Disp: 4 pen, Rfl: 2    anastrozole (ARIMIDEX) 1 MG tablet, Take 1 tablet by mouth daily, Disp: 30 tablet, Rfl: 6    Insulin Degludec (TRESIBA FLEXTOUCH) 200 UNIT/ML SOPN, INJECT 26 UNITS INTO THE SKIN DAILY (Patient taking differently: INJECT 20 UNITS INTO THE SKIN DAILY), Disp: 2 pen, Rfl: 2    metFORMIN (GLUCOPHAGE) 500 MG tablet, TAKE 1 TABLET BY MOUTH TWICE A DAY WITH MEALS, Disp: 180 tablet, Rfl: 1    Insulin Pen Needle 32G X 4 MM MISC, 1 each by Does not apply route daily, Disp: 100 each, Rfl: 3    atenolol (TENORMIN) 50 MG tablet, TAKE 1 TABLET BY MOUTH EVERY DAY, Disp: 90 tablet, Rfl: 1    pantoprazole (PROTONIX) 40 MG tablet, TAKE 1 TABLET BY MOUTH EVERY DAY, Disp: 90 tablet, Rfl: 1    Multiple Vitamin (MULTI-VITAMIN DAILY PO), Take by mouth daily, Disp: , Rfl:     amLODIPine (NORVASC) 5 MG tablet, Take 1 tablet by mouth daily, Disp: 90 tablet, Rfl: 1    aspirin 81 MG tablet, Take 81 mg by mouth daily, Disp: , Rfl:     Krill Oil 350 MG CAPS, Take 500 mg by mouth , Disp: , Rfl:     ALLERGIES:  Allergies   Allergen Reactions    Augmentin [Amoxicillin-Pot Clavulanate] Nausea And Vomiting    Bactrim [Sulfamethoxazole-Trimethoprim] Nausea And Vomiting    Vancomycin Rash    Dolobid [Diflunisal] Nausea And Vomiting         REVIEW OF SYSTEMS:    She feels the skin of the breast is healed well. It feels slightly warmer and she notes some darkening of the areola. Over the last few months she has developed some increasing back pain. She had x-rays with her primary care physician and is scheduled to begin physical therapy. She also notes discomfort in the chest wall region just lateral to the upper breast.      PHYSICAL EXAMINATION:    CHAPERONE: Not Required    ECO Asymptomatic    VITAL SIGNS: /85   Pulse 69   Temp 96.6 °F (35.9 °C) (Temporal)   Resp 15   Wt 269 lb 8 oz (122.2 kg)   SpO2 96%   BMI 43.50 kg/m²   GENERAL:  General appearance is that of a well-nourished, well-developed in no apparent distress. MSK:  She had some tenderness to palpation in upper lateral chest wall region. LABS:  WBC   Date Value Ref Range Status   2021 9.1 3.5 - 11.0 k/uL Final     Segs Absolute   Date Value Ref Range Status   2021 6.00 1.8 - 7.7 k/uL Final     Hemoglobin   Date Value Ref Range Status   2021 14.0 12.0 - 16.0 g/dL Final     Platelets   Date Value Ref Range Status   2021 244 140 - 450 k/uL Final         ASSESSMENT AND PLAN:  Anibal Lopez is a 63 y.o. female with a Cancer Staging  Ductal carcinoma in situ (DCIS) of left breast  Staging form: Breast, AJCC 8th Edition  - Pathologic stage from 11/10/2020: Stage 0 (pTis (DCIS), pN0, cM0, G2, ER+, OR+, HER2: Not Assessed) - Signed by Devika Harley MD on 2021    We discussed the discomfort she is having in the chest wall region at the edge of the breast.  I told her that considering her very small amount of DCIS this was quite unlikely to be related to her cancer. Her back pain is also quite unlikely to be related to her DCIS.   This

## 2021-06-08 ENCOUNTER — HOSPITAL ENCOUNTER (OUTPATIENT)
Dept: PHYSICAL THERAPY | Facility: CLINIC | Age: 66
Setting detail: THERAPIES SERIES
Discharge: HOME | End: 2021-06-08
Payer: COMMERCIAL

## 2021-06-08 ENCOUNTER — TELEPHONE (OUTPATIENT)
Dept: ONCOLOGY | Age: 66
End: 2021-06-08

## 2021-06-08 PROCEDURE — 97140 MANUAL THERAPY 1/> REGIONS: CPT

## 2021-06-08 PROCEDURE — 97162 PT EVAL MOD COMPLEX 30 MIN: CPT

## 2021-06-08 PROCEDURE — 97110 THERAPEUTIC EXERCISES: CPT

## 2021-06-08 NOTE — CONSULTS
[] [facility] &  Therapy  [address]  [phone]  F: [phone] [] [address]  [address]   [address]  P: [phone]  F: [phone] [] [address] &  Therapy  [address]  P: [phone]  F: [phone] [] [address]  P: [phone]  F: [phone] [] [address]  [facility]   [address]   [place]: [phone]  F: [phone]      Physical Therapy Spine Evaluation    Date:  2021  Patient: Audra Dejesus  : [birth year]  MRN: [id number]  Physician: Demi Chi MD   Insurance: [facility] Diagnosis: Chronic LBP without sciatica    Rehab Codes: M54.5, G89.29  Onset Date: 2021  Next 's appt.: Pending    Subjective:   CC:Low back pain  HPI: Patient developed LBP in early April with no specific mechanism. The pain increases with standing and walking and decreases with sitting. She has been undergoing cancer treatment since the fall and has had reduced activity tolerance and a 30# weight gain from her medications. She also felt that her back pain may be partially attributed to use of Anastrozole which has a side effect of bone loss, however her symptoms have not changed since she stopped taking that medication. PMHx: [] Unremarkable [x] Diabetes [x] HTN  [] Pacemaker   [] MI/Heart Problems [x] Cancer [] Arthritis [] Other:              [] Refer to full medical chart  In EPIC       Comorbidities:   [x] Obesity [] Dialysis  [] N/A   [] Asthma/COPD [] Dementia [] Other:   [] Stroke [x] Sleep apnea [] Other:   [] Vascular disease [] Rheumatic disease [] Other:     Tests: [x] X-Ray: [] MRI:  [] Other:  Impression   1. No evidence of acute compression fracture or malalignment.    2. Scattered degenerative changes, most significant at the facet joints. Medications: [x] Refer to full medical record [] None [] Other:  Allergies:      [x] Refer to full medical record [] None [] Other:    Function:  Hand Dominance  [x] Right  [] Left  Patient lives with: In what type of home [x]  One story   [] Two story   [] Split level   Number of stairs to enter    With handrail on the []  Right to enter   [] Left to enter   Bathroom has a []  Tub only  [] Tub/shower combo   [] Walk in shower    []  Grab bars   Washing machine is on []  Main level   [] Second level   [] Basement   Employer Retired    Job Status []  Normal duty   [] Light duty   [] Off due to condition    []  Retired   [] Not employed   [] Disability  [] Other:  []  Return to work:    Work activities/duties        ADL/IADL Previous level of function Current level of function Who currently assists the patient with task   Bathing  [] Independent  [] Assist [x] Independent  [] Assist    Dress/grooming [] Independent  [] Assist [x] Independent  [] Assist    Transfer/mobility [] Independent  [] Assist [x] Independent  [] Assist    Feeding [] Independent  [] Assist [x] Independent  [] Assist    Toileting [] Independent  [] Assist [x] Independent  [] Assist    Driving [] Independent  [] Assist [x] Independent  [] Assist    Housekeeping [] Independent  [] Assist [x] Independent  [] Assist    Grocery shop/meal prep [] Independent  [] Assist [x] Independent  [] Assist      Gait Prior level of function Current level of function    [] Independent  [] Assist [x] Independent  [] Assist   Device: [] Independent [x] Independent    [] Straight Cane [] Quad cane [] Straight Cane [] Quad cane    [] Standard walker [] Rolling walker   [] 4 wheeled walker [] Standard walker [] Rolling walker   [] 4 wheeled walker    [] Wheelchair [] Wheelchair     Pain:  [x] Yes  [] No Location: low back Pain Rating: (0-10 scale) 8/10  Pain altered Tx:  [] Yes  [x] No Action:    Symptoms:  [] Improving [] Worsening [x] Same  Better:  [] AM    [] PM    [x] Sit    [] Rise/Sit    []Stand    [] Walk    [] Lying    [] Other:  Worse: [] AM    [] PM    [] Sit    [] Rise/Sit    [x]Stand    [x] Walk    [x] Lying    [] Bend                      [] Valsalva    [] Other:  Sleep: [] OK    [x] Disturbed    Objective:      STRENGTH  STRENGTH  ROM    Left Right  Left Right Cervical    C5 Shld Abd   L1-2 Hip Flex 5 5 Flexion    Shld Flexion   Hip Abd   Extension    Shld IR   L3-4 Knee Ext 5 5 Rotation L R   Shld ER   L4 Ankle DF 5 5 Sidebend L R   C6 Elb Flex   L5 EHL 5 5 Retraction    C7 Elb Ext   S1 Plant. Flex 5 5 Lumbar    C8 EPL   Abdominals 4+  Flexion 100%   T1 Fing Abd   Erector Spinae   Extension 50% pn         Rotation L 100% R 100%         Sidebend L 75% pn R 100%         UE/LE                                                              TESTS (+/-) LEFT RIGHT Not Tested   SLR [] sit [] supine - - []   Hamstring (SLR) - - []   SKTC   []   DKTC   []   Slump/Dural   []   SI JT   []   THUY   []   Joint Mobility   []   Cerv. Comp   []   Cerv. Distraction   []   Cerv. Alar/Transverse   []   Vertebral Artery   []   Adsons   []   Rosiland Cane   []   Elana Tests ? Pain ? Pain No Change Not Tested   RFIS [] [] [x] []   JOSE [x] [] [] []   RFIL [] [] [x] []   REIL [x] [] [] []   Rep Prot [] [] [] []   Rep Retract [] [] [] []       OBSERVATION No Deficit Deficit Not Tested Comments   Posture    No notable weigh shift, forward shoulder and flexed posture.    Forward Head [] [x] []    Rounded Shoulders [] [x] []    Kyphosis [] [x] []    Lordosis [] [] []    Lateral Shift [] [] []    Scoliosis [] [] []    Iliac Crest [] [] []    PSIS [] [] []    ASIS [] [] []    Genu Valgus [] [] []    Genu Varus [] [] []    Genu Recurvatum [] [] []    Pronation [] [] []    Supination [] [] []    Leg Length Discrp [] [] []    Slumped Sitting [] [] []    Palpation [] [x] [] TTP: B erector spinae with hypertonicity

## 2021-06-08 NOTE — TELEPHONE ENCOUNTER
Called Vance and left message. Reminded her that I am her oncology nurse navigator at [facility] ([facility] Let her know that I wanted to check in with her, see how she is doing. I realize that she is holding her anastrazole and I wanted to see if she is feeling better. Encouraged her to call me back when she can. Left my name and contact information.

## 2021-06-15 ENCOUNTER — HOSPITAL ENCOUNTER (OUTPATIENT)
Dept: PHYSICAL THERAPY | Facility: CLINIC | Age: 66
Setting detail: THERAPIES SERIES
Discharge: HOME | End: 2021-06-15
Payer: COMMERCIAL

## 2021-06-15 PROCEDURE — 97110 THERAPEUTIC EXERCISES: CPT

## 2021-06-15 PROCEDURE — 97140 MANUAL THERAPY 1/> REGIONS: CPT

## 2021-06-15 NOTE — FLOWSHEET NOTE
[] [facility]) [facility] &  Therapy  [address]  [phone]  F: [phone] [] [address]  [facility] [address]   [address]  P: [phone]  F: [phone] [] [address] &  Therapy  [address]  P: [phone]  F: [phone] [x] [address]  P: [phone]  F: [phone] [] [address]  [facility]   [address]   [place]: [phone]  F: [phone]      Physical Therapy Daily Treatment Note    Date:  6/15/2021  Patient Name:  Danae Simmonds    :  1955  MRN: [id number]  Physician: Renetta Garrett MD                          Insurance: [employer]  Medical Diagnosis: Chronic LBP without sciatica                  Rehab Codes: M54.5, G89.29  Onset Date: 2021                      Next 's appt.: Pending    Visit# / total visits:     Cancels/No Shows: 0/0    Subjective:    Pain:  [x] Yes  [] No Location: low back Pain Rating: (0-10 scale) 5/10  Pain altered Tx:  [x] No  [] Yes  Action:  Comments: Patient reports feeling much better for one day following last visit. She has been doing HEP. Objective:  Modalities:   Precautions: Hx of Cancer, Diabetes  Exercises:  Exercise Reps/ Time Weight/ Level Comments HEP   PPT 2x10     X 2x daily   PPT with march 2x10    X    PPT with SLR 2x5        PPT with S/L bicycle 2x5      Isometric Hip flex 5x5 sec ea     X    Angry Cat 2x10     X    Seated flexion     Some LBP and tightness left      Bridge Roll up  2x5          LTR  2x10         Nu-step 10 min L4       Other: Side lying MFR to bilateral erector spinae in left side lying.         Treatment Charges: Mins Units   []  Modalities     [x]  Ther Exercise 41 3   [x]  Manual Therapy 12 1   []  Ther Activities     []  Aquatics     [] Vasocompression     []  Other     Total Treatment time 53        Assessment: [x] Progressing toward goals. Patient demonstrates improved pelvic tilt but fatigues rapidly with core progression. She requires frequent rest breaks between exercises. No complaints of pain. [] No change. [] Other:  [x] Patient would continue to benefit from skilled physical therapy services in order to: Improve back pain, core strength and function. STG/LTG:  STG: (to be met in 6 treatments)  1. ? Pain: 6/10  2. Patient to be independent with home exercise program as demonstrated by performance with correct form without cues. LTG: (to be met in 16 treatments)  1. Patient will be able to walk > 10 minutes without sitting  2. Patient will improve Oswestry by >20%  3. Patient will Patient will stand > 15 minutes. Pt. Education:  [] Yes  [] No  [] Reviewed Prior HEP/Ed  Method of Education: [] Verbal  [] Demo  [] Written  Comprehension of Education:  [] Verbalizes understanding. [] Demonstrates understanding. [] Needs review. [] Demonstrates/verbalizes HEP/Ed previously given. Plan: [x] Continue current frequency toward long and short term goals.        [x] Specific Instructions for subsequent treatments: Specific Instructions for next treatment: Flexion based program with manual therapy PRN, Nu-step         Time In:1:00pm            Time Out: 1:55pm    Electronically signed by:  Danni Palacio, PT

## 2021-06-17 RX ORDER — ATENOLOL 50 MG/1
TABLET ORAL
Qty: 90 TABLET | Refills: 1 | Status: SHIPPED | OUTPATIENT
Start: 2021-06-17 | End: 2021-07-07 | Stop reason: ALTCHOICE

## 2021-06-18 ENCOUNTER — HOSPITAL ENCOUNTER (OUTPATIENT)
Dept: PHYSICAL THERAPY | Facility: CLINIC | Age: 66
Setting detail: THERAPIES SERIES
Discharge: HOME | End: 2021-06-18
Payer: COMMERCIAL

## 2021-06-18 PROCEDURE — 97140 MANUAL THERAPY 1/> REGIONS: CPT

## 2021-06-18 PROCEDURE — 97110 THERAPEUTIC EXERCISES: CPT

## 2021-06-18 NOTE — FLOWSHEET NOTE
[] [facility]) [facility] &  Therapy  [address]  [phone]  F: [phone] [] [address]  [address]   [address]  P: [phone]  F: [phone] [] [address] &  Therapy  [address]  P: [phone]  F: [phone] [x] [address]  P: [phone]  F: [phone] [] [address]  [facility]   [address]   [place]: [phone]  F: [phone]      Physical Therapy Daily Treatment Note    Date:  2021  Patient Name:  Anuja Hdez    :  1955  MRN: [id number]  Physician: Froylan Grajdea MD                          Insurance: [facility]  Medical Diagnosis: Chronic LBP without sciatica                  Rehab Codes: M54.5, G89.29  Onset Date: 2021                      Next 's appt.: Pending    Visit# / total visits: 3/16    Cancels/No Shows: 0/0    Subjective:    Pain:  [x] Yes  [] No Location: low back Pain Rating: (0-10 scale) 6-7/10  Pain altered Tx:  [x] No  [] Yes  Action:  Comments: Pt reports she is unsure of why she has had a rough week with increased LBP since last visit. Pt also states she has pain with standing and walking, no pain with sitting. Objective:  Modalities:   Precautions: Hx of Cancer, Diabetes  Exercises:  Exercise Reps/ Time Weight/ Level Comments HEP Completed   PPT 2x10     X 2x daily x   PPT with march 2x10    X  x   PPT with SLR 2x5      x   PPT with S/L bicycle 2x5    x   Isometric Hip flex 5x5 sec ea     X  x   Angry Cat 2x10     X  x   Seated flexion     Attempted with        Bridge Roll up  2x5       x    LTR  2x10       x   Nu-step 10 min L4     x   Other: Side lying MFR to bilateral erector spinae in left side lying.     Treatment Charges: Mins Units   []  Modalities     [x]  Ther Exercise 30 2   [x]  Manual Therapy 15 1   []  Ther Activities     []  Aquatics     []  Vasocompression     []  Other     Total Treatment time 45 3       Assessment: [x] Progressing toward goals. Initiated tx with Nustep with good tolerance. Pt then continued with manual work in L side lying, sig tenderness with palpation at the beginning, however decreased tenderness and tension noted after. Continued with flexion based core exercises with good tolerance, able to perform seated lumbar stretch with red PB with good tolerance today and without pain. Pt reports slight decreased pain after interventions this date. Plan to incr reps next visit if able. .  [] No change. [] Other:  [x] Patient would continue to benefit from skilled physical therapy services in order to: Improve back pain, core strength and function. STG/LTG:  STG: (to be met in 6 treatments)  1. ? Pain: 6/10  2. Patient to be independent with home exercise program as demonstrated by performance with correct form without cues. LTG: (to be met in 16 treatments)  1. Patient will be able to walk > 10 minutes without sitting  2. Patient will improve Oswestry by >20%  3. Patient will Patient will stand > 15 minutes. Pt. Education:  [] Yes  [] No  [] Reviewed Prior HEP/Ed  Method of Education: [] Verbal  [] Demo  [] Written  Comprehension of Education:  [] Verbalizes understanding. [] Demonstrates understanding. [] Needs review. [] Demonstrates/verbalizes HEP/Ed previously given. Plan: [x] Continue current frequency toward long and short term goals.      [x] Specific Instructions for subsequent treatments: Specific Instructions for next treatment: Flexion based program with manual therapy PRN, Nu-step         Time In: 10:00am            Time Out: 10:45am    Electronically signed by:  Yehuda Krishna PTA

## 2021-06-19 DIAGNOSIS — E11.65 TYPE 2 DIABETES MELLITUS WITH HYPERGLYCEMIA, WITHOUT LONG-TERM CURRENT USE OF INSULIN (HCC): ICD-10-CM

## 2021-06-21 RX ORDER — INSULIN DEGLUDEC 200 U/ML
INJECTION, SOLUTION SUBCUTANEOUS
Qty: 3 PEN | Refills: 0 | Status: SHIPPED | OUTPATIENT
Start: 2021-06-21 | End: 2021-07-07 | Stop reason: SDUPTHER

## 2021-06-22 ENCOUNTER — HOSPITAL ENCOUNTER (OUTPATIENT)
Dept: PHYSICAL THERAPY | Facility: CLINIC | Age: 66
Setting detail: THERAPIES SERIES
Discharge: HOME | End: 2021-06-22
Payer: COMMERCIAL

## 2021-06-22 PROCEDURE — 97140 MANUAL THERAPY 1/> REGIONS: CPT

## 2021-06-22 PROCEDURE — 97110 THERAPEUTIC EXERCISES: CPT

## 2021-06-22 RX ORDER — PANTOPRAZOLE SODIUM 40 MG/1
40 TABLET, DELAYED RELEASE ORAL DAILY
Qty: 90 TABLET | Refills: 1 | Status: SHIPPED | OUTPATIENT
Start: 2021-06-22 | End: 2021-09-26

## 2021-06-22 NOTE — FLOWSHEET NOTE
[] [facility]) [facility] &  Therapy  [address]  [phone]  F: [phone] [] [address]  [address]   [address]  P: [phone]  F: [phone] [] [address] &  Therapy  [address] [facility]  P: [phone]  F: [phone] [x] [address]  P: [phone]  F: [phone] [] [address]  [facility]   [address]   [place]: [phone]  F: [phone]      Physical Therapy Daily Treatment Note    Date:  2021  Patient Name:  Vasyl Schaffer    :  1955  MRN: [id number]  Physician: Sofya Clayton MD                          Insurance: [employer]  Medical Diagnosis: Chronic LBP without sciatica                  Rehab Codes: M54.5, G89.29  Onset Date: 2021                      Next 's appt.: Pending    Visit# / total visits:     Cancels/No Shows: 0/0    Subjective:    Pain:  [x] Yes  [] No Location: low back Pain Rating: (0-10 scale) 6-7/10  Pain altered Tx:  [x] No  [] Yes  Action:  Comments: Pt reports \"feeling the same\" upon arrival \"no better, no worse\". Pt reports performing PPT and the angry cat exercises for HEP with temporary positive relief. Pt also states that prolonged walking and standing cont to be the most problematic activities.      Objective:  Modalities:   Precautions: Hx of Cancer, Diabetes  Exercises:  Exercise Reps/ Time Weight/ Level Comments HEP Completed   PPT 2x10  5\"   X 2x daily x   PPT with march 2x10    X  x   PPT ALT UEs 2x10    x   PPT with SLR 2x5      x   PPT with S/L bicycle 2x5    x   Isometric Hip flex 5x5 sec ea     X  -   Angry Cat 2x10     X  x   Seated flexion  3x5\" hold Red PB Attempted with ball       Bridge Roll up  2x10       x    LTR  2x10       x   Nu-step 8 min L5  Resume to 10' NEXT   x   Other: Side lying MFR to bilateral erector spinae in left side lying. Treatment Charges: Mins Units   []  Modalities     [x]  Ther Exercise 30 2   [x]  Manual Therapy 15 1   []  Ther Activities     []  Aquatics     []  Vasocompression     []  Other     Total Treatment time 45 3       Assessment: [x] Progressing toward goals. Pt requests to cut her appointment short today, needing to leave by 10:40am. Initiated tx on Nustep followed by mat exercises focusing on flexion based DLS with addition of PPT+ alternating arm lifts with good armen. Cont with MFR to B lumbar erector spianae, decreased tension noted and reported after, especially R side. Pt rpeorts feeling \"looser\" after tx. .  [] No change. [] Other:  [x] Patient would continue to benefit from skilled physical therapy services in order to: Improve back pain, core strength and function. STG/LTG:  STG: (to be met in 6 treatments)  1. ? Pain: 6/10  2. Patient to be independent with home exercise program as demonstrated by performance with correct form without cues. LTG: (to be met in 16 treatments)  1. Patient will be able to walk > 10 minutes without sitting  2. Patient will improve Oswestry by >20%  3. Patient will Patient will stand > 15 minutes. Pt. Education:  [] Yes  [] No  [] Reviewed Prior HEP/Ed  Method of Education: [] Verbal  [] Demo  [] Written  Comprehension of Education:  [] Verbalizes understanding. [] Demonstrates understanding. [] Needs review. [] Demonstrates/verbalizes HEP/Ed previously given. Plan: [x] Continue current frequency toward long and short term goals.      [x] Specific Instructions for subsequent treatments: Specific Instructions for next treatment: Flexion based program with manual therapy PRN, Nu-step         Time In: 10:00am            Time Out: 10:45am    Electronically signed by:  Chaya Smith PTA

## 2021-06-25 ENCOUNTER — HOSPITAL ENCOUNTER (OUTPATIENT)
Dept: PHYSICAL THERAPY | Facility: CLINIC | Age: 66
Setting detail: THERAPIES SERIES
Discharge: HOME | End: 2021-06-25
Payer: COMMERCIAL

## 2021-06-25 PROCEDURE — 97110 THERAPEUTIC EXERCISES: CPT

## 2021-06-25 PROCEDURE — 97140 MANUAL THERAPY 1/> REGIONS: CPT

## 2021-06-25 NOTE — FLOWSHEET NOTE
temporary pain, tingling , numbness and a low risk of pneumothrax. She was informed of the potential benefits of Dry Needling. Patient gave verbal consent to proceed and signed a Dry Needling Acknowledgement form. Dry Needling perfomed in conjunction with Manual therapy to promote tissue extensibility, improve ROM, and reduce pain. No charge submitted for the time the needle was inserted. 2 right iliocostalis trigger points treated with a 50mm needle using a bony backdrop for safety. Multiple twitch responses produced. Treatment Charges: Mins Units   []  Modalities     [x]  Ther Exercise 30 2   [x]  Manual Therapy 15 1   []  Ther Activities     []  Aquatics     []  Vasocompression     [x]  Other: Dry Needling 2 -   Total Treatment time 47 3       Assessment: [x] Progressing toward goals. Pt had no pain with exercises. A trial of Dry Needling had no immediate impact on pain and tightness. Bridges were discontinued for the time being and removed from HEP. She displays improved TA activation and bed mobility but pain remains unchanged. .  [] No change. [] Other:  [x] Patient would continue to benefit from skilled physical therapy services in order to: Improve back pain, core strength and function. STG/LTG:  STG: (to be met in 6 treatments)  1. ? Pain: 6/10  2. Patient to be independent with home exercise program as demonstrated by performance with correct form without cues. LTG: (to be met in 16 treatments)  1. Patient will be able to walk > 10 minutes without sitting  2. Patient will improve Oswestry by >20%  3. Patient will Patient will stand > 15 minutes. Pt. Education:  [] Yes  [] No  [] Reviewed Prior HEP/Ed  Method of Education: [] Verbal  [] Demo  [] Written  Comprehension of Education:  [] Verbalizes understanding. [] Demonstrates understanding. [] Needs review. [] Demonstrates/verbalizes HEP/Ed previously given.      Plan: [x] Continue current frequency toward long and short term goals.      [x] Specific Instructions for subsequent treatments: Specific Instructions for next treatment: Flexion based program with manual therapy PRN, Nu-step         Time In: 10:03am            Time Out: 10:53am    Electronically signed by:  Candis Stratton PT

## 2021-06-29 ENCOUNTER — HOSPITAL ENCOUNTER (OUTPATIENT)
Dept: PHYSICAL THERAPY | Facility: CLINIC | Age: 66
Setting detail: THERAPIES SERIES
Discharge: HOME | End: 2021-06-29
Payer: COMMERCIAL

## 2021-06-29 PROCEDURE — 97140 MANUAL THERAPY 1/> REGIONS: CPT

## 2021-06-29 PROCEDURE — 97530 THERAPEUTIC ACTIVITIES: CPT

## 2021-06-29 PROCEDURE — 97110 THERAPEUTIC EXERCISES: CPT

## 2021-06-29 NOTE — FLOWSHEET NOTE
[] [facility]) [facility] &  Therapy  [address]  [phone]  F: [phone] [] [address]  [address]   [address]  P: [phone]  F: [phone] [] [address] &  Therapy  [address] [facility]  P: [phone]  F: [phone] [x] [address]  P: [phone]  F: [phone] [] [address]  [facility]   [address]   [place]: [phone]  F: [phone]      Physical Therapy Daily Treatment Note    Date:  2021  Patient Name:  Randolph Pappas    :  1955  MRN: [id number]  Physician: Richa Kruger MD                          Insurance: [employer]  Medical Diagnosis: Chronic LBP without sciatica                  Rehab Codes: M54.5, G89.29  Onset Date: 2021                      Next 's appt.: Pending    Visit# / total visits:     Cancels/No Shows: 0/0    Subjective:    Pain:  [x] Yes  [] No Location: low back Pain Rating: (0-10 scale) 6-7/10  Pain altered Tx:  [x] No  [] Yes  Action:  Comments: Pt states that she woke up with pain today rating at 6/10. Pt states no relief from last visit with attempted dry needling. Objective:  Modalities:   Precautions: Hx of Breast Cancer, Diabetes  Exercises:  Exercise Reps/ Time Weight/ Level Comments HEP Completed   PPT 2x10  5\"   X 2x daily x   PPT with march 2x10    X     PPT ALT UEs 2x10       PPT with SLR          PPT with S/L bicycle 2x5       Isometric Hip flex 5x5 sec ea     X  -   Angry Cat 2x10     X  x   Seated flexion 2x30\" hold  Attempted with ball   x    Bridge Roll up             LTR  2x10       x   Nu-step 10 min L5    x   Other: Side lying MFR to bilateral erector spinae in left side lying.     Held IDN today  Dry Needling perfomed in conjunction with Manual therapy to promote tissue extensibility, improve ROM, and reduce pain. No charge submitted for the time the needle was inserted. 2 right iliocostalis trigger points treated with a 50mm needle using a bony backdrop for safety. Multiple twitch responses produced. Treatment Charges: Mins Units   []  Modalities     [x]  Ther Exercise 10 1   [x]  Manual Therapy 15 1   [x]  Ther Activities 15 1   []  Aquatics     []  Vasocompression     []  Other: Dry Needling     Total Treatment time 40 3       Assessment: [x] Progressing toward goals. Initiated tx with Nustep followed by STM to B lumbar and thoracic paraspinals, with decreased tension reported after. Pt then performed LTR, posterior pelvic tilts, and cat (lumbar flexion in quadruped) with fair armen. Pt becomes tearful when discussing continued pain and problems in low back. PTA discussed possible benefits of aquatic therapy with pt and primary PT, both were agreeable. .  [] No change. [] Other:  [x] Patient would continue to benefit from skilled physical therapy services in order to: Improve back pain, core strength and function. STG/LTG:  STG: (to be met in 6 treatments)  1. ? Pain: 6/10  2. Patient to be independent with home exercise program as demonstrated by performance with correct form without cues. LTG: (to be met in 16 treatments)  1. Patient will be able to walk > 10 minutes without sitting  2. Patient will improve Oswestry by >20%  3. Patient will Patient will stand > 15 minutes. Pt. Education:  [x] Yes  [] No  [x] Reviewed Prior HEP/Ed  Method of Education: [] Verbal  [] Demo  [] Written  Comprehension of Education:  [] Verbalizes understanding. [] Demonstrates understanding. [x] Needs review. [] Demonstrates/verbalizes HEP/Ed previously given. Plan: [x] Continue current frequency toward long and short term goals.      [] Specific Instructions for subsequent treatments:         Time In: 10:00a,            Time Out: 10:45am    Electronically signed by:  Sera Salomon, PTA

## 2021-07-02 ENCOUNTER — APPOINTMENT (OUTPATIENT)
Dept: PHYSICAL THERAPY | Facility: CLINIC | Age: 66
End: 2021-07-02
Payer: COMMERCIAL

## 2021-07-02 ENCOUNTER — HOSPITAL ENCOUNTER (OUTPATIENT)
Dept: PHYSICAL THERAPY | Facility: CLINIC | Age: 66
Setting detail: THERAPIES SERIES
Discharge: HOME | End: 2021-07-02
Payer: COMMERCIAL

## 2021-07-02 PROCEDURE — 97113 AQUATIC THERAPY/EXERCISES: CPT

## 2021-07-02 NOTE — FLOWSHEET NOTE
[] [facility] Out[facility]       Physical Therapy [facility]       [address]        [address]       Phone: [phone]       Fax: [phone] [] [facility]       Promotion at [address] [facility]       Phone: [phone]       Fax: [phone] [x] [facility] Promotion  [address]   Phone: [phone]   Fax:  [phone]     Physical Therapy Daily  Aquatic Treatment Note    Date:  2021  Patient Name:  Tim Salgado    :  1955  MRN: [id number]  Physician: NILSON Butterfield                          [id number]: [address]  Medical Diagnosis: Chronic LBP without sciatica                  Rehab Codes: M54.5, G89.29  Onset Date: 2021                      Next 's appt.: Pending     Visit# / total visits:                       Cancels/No Shows: 0/0      Subjective:    Pain:  [x] Yes  [] No Location: LB Pain Rating: (0-10 scale) 6/10 with amb  Pain altered Tx:  [] No  [x] Yes  Action: initiated pool due to poor armen of land ex and increased LBP. Comments: Pt to pool area and unsure if she wants to get in the water. Pt not sure if this will help her. Edu given about the water and how it may help relieve pain and pressure in LB, and allow us to gently strengthen and increase activity tolerance. Pt has LBP alejandra with amb. Pt gets relief when sits and rests. LBP wakes her at night and she feels unable to lose weight due to decreased activity tolerance. Objective:   Precaution:History of Breast Ca, mastectomy and radiation.      KEY  B = Belt G = Gloves N = Noodle   C = Cuffs K = Kickboard P = Paddles   CC = Cervical Collar L = Laps T = Theratube   DB = Dumbells M = Minutes W = Weights     Exercises/Activities  Warm-up/Amb 7/2 Dynamic Exercises 7/2   Forward 3L March    Sideways 3L Squat    Backwards 3L Retro HS curls      Retro SLR    Stretches  Braiding    Gastroc/Soleus  Heel to Toe amb    Hamstring Toe amb    Hip flexor  Heel amb    Piriformis      SKTC      Pec Stretch      Post Deltoid  Static Exercises UE      Shoulder flex/ext    Static Exercises LE  Shoulder abd/add    Heel/toe raises 10 Shoulder H.  abd/add    Marches 10 Shoulder IR/ER    Mini-squats  Rowing    4-way hip  10 abd Arm Circles    Hamstring curls 10 UT shrugs/rolls    Hip Circles/Fig 8  Scap squeezes    Ankle ROM  Diagonals 1/2    Lunges   Elbow flex/ext      Pron/Sup    Functional Exercise  Wrist AROM    Step      Wall Push-ups  Deep H20/    SLS  Bike 3m   Breast Stroke on Noodle  Hip abd/add    Noodle Twist  Hip flex/ext    Noodle Push down  Hip IR/ER    Kickboard push/pull  Knee flex/ext      Push/pull on [employer] 5m   Other:    Specific Instructions for next treatment: Progress LE/UE ex with TVA contr as pt armen. Treatment Charges: Mins Units   []  Modalities     []  Ther Exercise     []  Manual Therapy     []  Ther Activities     [x]  Aquatics 30 2   []  Other       Assessment: [x] Progressing toward goals. Initiated very light aquatic ex today due to pt pain and misgivings about aquatics. Pt can notice the reduced pain and pressure on LB while walking in the water. Light ex per flow sheet with some LB soreness after. To deep water for reduced WB and decompression on spine. Pt moving slowly after aquatics but states she is not feeling too bad. To sched 2 days a week of aquatics next 2 weeks, then re check with primary PT.    [] No change. [] Other:  STG: (to be met in 6 treatments)  1. ? Pain: 6/10  2. Patient to be independent with home exercise program as demonstrated by performance with correct form without cues.     LTG: (to be met in 16 treatments)  1. Patient will be able to walk > 10 minutes without sitting  2. Patient will improve Oswestry by >20%  3. Patient will Patient will stand > 15 minutes.       Pt.  Education:  [x] Yes  [] No  [] Reviewed Prior HEP/Ed  Method of Education: [x] Verbal  [] Demo  [] Written  Comprehension of Education:  [x] Verbalizes understanding. [] Demonstrates understanding. [] Needs review. [] Demonstrates/verbalizes HEP/Ed previously given. Plan: [x] Continue per plan of care. [x] Other: Sched re check with primary PT week of 7/19/21.       Time In:2:35pm            Time Out: 3:15pm    Electronically signed by:  Sunitha Sanchez PTA

## 2021-07-07 ENCOUNTER — OFFICE VISIT (OUTPATIENT)
Dept: FAMILY MEDICINE CLINIC | Age: 66
End: 2021-07-07
Payer: COMMERCIAL

## 2021-07-07 VITALS
BODY MASS INDEX: 42.43 KG/M2 | SYSTOLIC BLOOD PRESSURE: 122 MMHG | HEIGHT: 66 IN | TEMPERATURE: 97 F | HEART RATE: 84 BPM | DIASTOLIC BLOOD PRESSURE: 84 MMHG | WEIGHT: 264 LBS | OXYGEN SATURATION: 96 %

## 2021-07-07 DIAGNOSIS — E78.41 ELEVATED LIPOPROTEIN(A): ICD-10-CM

## 2021-07-07 DIAGNOSIS — D05.12 DUCTAL CARCINOMA IN SITU (DCIS) OF LEFT BREAST: ICD-10-CM

## 2021-07-07 DIAGNOSIS — Z79.4 TYPE 2 DIABETES MELLITUS WITHOUT COMPLICATION, WITH LONG-TERM CURRENT USE OF INSULIN (HCC): Primary | ICD-10-CM

## 2021-07-07 DIAGNOSIS — E11.9 TYPE 2 DIABETES MELLITUS WITHOUT COMPLICATION, WITH LONG-TERM CURRENT USE OF INSULIN (HCC): Primary | ICD-10-CM

## 2021-07-07 DIAGNOSIS — E11.42 DIABETIC POLYNEUROPATHY ASSOCIATED WITH TYPE 2 DIABETES MELLITUS (HCC): ICD-10-CM

## 2021-07-07 DIAGNOSIS — G47.33 OBSTRUCTIVE SLEEP APNEA: ICD-10-CM

## 2021-07-07 DIAGNOSIS — I10 ESSENTIAL HYPERTENSION: ICD-10-CM

## 2021-07-07 DIAGNOSIS — E66.01 SEVERE OBESITY (BMI >= 40) (HCC): ICD-10-CM

## 2021-07-07 PROCEDURE — 99214 OFFICE O/P EST MOD 30 MIN: CPT | Performed by: FAMILY MEDICINE

## 2021-07-07 PROCEDURE — 83036 HEMOGLOBIN GLYCOSYLATED A1C: CPT | Performed by: FAMILY MEDICINE

## 2021-07-07 RX ORDER — LOSARTAN POTASSIUM 50 MG/1
50 TABLET ORAL DAILY
Qty: 30 TABLET | Refills: 5 | Status: SHIPPED | OUTPATIENT
Start: 2021-07-07 | End: 2021-07-29 | Stop reason: DRUGHIGH

## 2021-07-07 RX ORDER — DULAGLUTIDE 0.75 MG/.5ML
0.75 INJECTION, SOLUTION SUBCUTANEOUS WEEKLY
Qty: 4 PEN | Refills: 2 | Status: SHIPPED | OUTPATIENT
Start: 2021-07-07 | End: 2021-09-21

## 2021-07-07 RX ORDER — INSULIN DEGLUDEC 200 U/ML
INJECTION, SOLUTION SUBCUTANEOUS
Qty: 3 PEN | Refills: 0
Start: 2021-07-07 | End: 2021-10-25

## 2021-07-07 NOTE — PROGRESS NOTES
[address] PRIMARY CARE  [address] [place] [address] [address] [place] [address]  Dept: [phone]  Dept Fax: [phone]    Yeni Hawkins is a 61 y.o. female who is a Established patient, who presents today for her medical conditions/complaints as noted below:  Chief Complaint   Patient presents with    Diabetes         HPI:     The patient is a pleasant 63year old female with a past medical history significant for diabetes, hypertension, obesity, DCIS who presents for 3-month checkup. The patient tried a higher dose of trulicity but could not tolerate the dose increase because of significant diarrhea and abdominal cramping. She is on 16 units of insulin as well. Patient's blood sugars have been consistently improving. She would like to decrease the dose of Trulicity back down to the 1 she tolerated and continue on the current insulin units. She is frustrated because she has not been able to lose weight but understands this is a process. The patient recently established with a different oncologist for her history of DCIS. History was reviewed with them and they actually contacted our office to discuss her case. The patient was advised of the need for aromatase inhibitor but stated she would rather hold off due to severe side effects from medication in the past.  The oncologist states he will monitor her closely. Patient does have DIONI and would like to get new equipment. She is compliant with the CPAP machine regularly >90% of the time. Diabetes  She presents for her follow-up diabetic visit. She has type 2 diabetes mellitus. Her disease course has been stable. There are no hypoglycemic associated symptoms. There are no diabetic associated symptoms. There are no hypoglycemic complications. Symptoms are stable. Diabetic complications include peripheral neuropathy.  Risk factors for coronary artery disease include diabetes mellitus, dyslipidemia, obesity, sedentary lifestyle, post-menopausal and hypertension. Current diabetic treatment includes insulin injections and oral agent (monotherapy). She is compliant with treatment all of the time. Her weight is stable. She is following a generally healthy diet. Meal planning includes avoidance of concentrated sweets and carbohydrate counting. She rarely participates in exercise. Her home blood glucose trend is decreasing steadily. Her breakfast blood glucose is taken between 7-8 am. Her breakfast blood glucose range is generally 140-180 mg/dl. Her dinner blood glucose range is generally 140-180 mg/dl. An ACE inhibitor/angiotensin II receptor blocker is being taken. She does not see a podiatrist.Eye exam is current. Hemoglobin A1C (%)   Date Value   04/14/2021 10.9   11/30/2020 12.9   07/06/2020 14.0             ( goal A1Cis < 7)   Microalb/Crt.  Ratio (mcg/mg creat)   Date Value   11/30/2020 CANNOT BE CALCULATED     LDL Cholesterol (mg/dL)   Date Value   04/27/2021 108   02/18/2020 140 (H)       (goal LDL is <100)   AST (U/L)   Date Value   05/20/2021 23     ALT (U/L)   Date Value   05/20/2021 24     BUN (mg/dL)   Date Value   05/20/2021 13     BP Readings from Last 3 Encounters:   07/07/21 122/84   06/07/21 135/85   05/26/21 118/74          (goal 120/80)    Past Medical History:   Diagnosis Date    Breast cancer ([facility] [address]     left breast    Diabetes mellitus ([facility] [address] [facility]     Emphysema (subcutaneous) (surgical) resulting from a procedure     History of therapeutic radiation     Hx of blood clots     left leg    Hypertension     Kidney calculi     passed on her own    Neuropathy     DIONI on CPAP     uses nightly    Prolonged emergence from general anesthesia     Sleep apnea       Past Surgical History:   Procedure Laterality Date    BREAST BIOPSY Left 12/15/2020    LEFT BREAST LUMPECTOMY WITH   NEEDLE LOCALIZATION (11:30) performed by Jamal Walker DO at [facility]      removed polyps    COLONOSCOPY 10/26/2020    with Biopsy    COLONOSCOPY N/A 10/26/2020    COLONOSCOPY POLYPECTOMY  BIOPSY performed by Chantelle López MD at [address], COLON, DIAGNOSTIC      KNEE ARTHROSCOPY Left     HARI STEROTACTIC LOC BREAST BIOPSY LEFT  11/10/2020    HARI STEROTACTIC LOC BREAST BIOPSY LEFT 11/10/2020 [facility]    OTHER SURGICAL HISTORY      DNC for post menapausal bleeding    TONSILLECTOMY      US THYROID BIOPSY         Family History   Problem Relation Age of Onset    Breast Cancer Mother     Heart Defect Mother         Murmur     Alzheimer's Disease Mother     Diabetes type 2  Mother     Heart Disease Father     Other Father         AA       Social History     Tobacco Use    Smoking status: Former Smoker     Packs/day: 2.00     Years: 44.00     Pack years: 88.00     Quit date: 2019     Years since quittin.2    Smokeless tobacco: Never Used   Substance Use Topics    Alcohol use: Not Currently      Current Outpatient Medications   Medication Sig Dispense Refill    Dulaglutide (TRULICITY) 6.20 NG/6.4VG SOPN Inject 0.75 mg into the skin once a week 4 pen 2    Insulin Degludec (TRESIBA FLEXTOUCH) 200 UNIT/ML SOPN INJECT 16 UNITS INTO THE SKIN DAILY 3 pen 0    pantoprazole (PROTONIX) 40 MG tablet Take 1 tablet by mouth daily 90 tablet 1    Insulin Pen Needle 32G X 4 MM MISC 1 each by Does not apply route daily 100 each 3    Multiple Vitamin (MULTI-VITAMIN DAILY PO) Take by mouth daily      aspirin 81 MG tablet Take 81 mg by mouth daily      Krill Oil 350 MG CAPS Take 500 mg by mouth       amLODIPine (NORVASC) 5 MG tablet TAKE 1 TABLET BY MOUTH EVERY DAY 90 tablet 1    atenolol (TENORMIN) 50 MG tablet Take 50 mg by mouth daily      metFORMIN (GLUCOPHAGE) 500 MG tablet TAKE 1 TABLET BY MOUTH TWICE A DAY WITH MEALS 180 tablet 1     No current facility-administered medications for this visit.      Allergies   Allergen Reactions    Augmentin [Amoxicillin-Pot Clavulanate] Nausea And Vomiting    Bactrim [Sulfamethoxazole-Trimethoprim] Nausea And Vomiting    Vancomycin Rash    Dolobid [Diflunisal] Nausea And Vomiting       Health Maintenance   Topic Date Due    Shingles Vaccine (1 of 2) Never done    A1C test (Diabetic or Prediabetic)  07/14/2021    Diabetic retinal exam  08/21/2021    Flu vaccine (1) 09/01/2021    Low dose CT lung screening  10/28/2021    Diabetic microalbuminuria test  11/30/2021    Lipid screen  04/27/2022    Potassium monitoring  05/20/2022    Creatinine monitoring  05/20/2022    Diabetic foot exam  07/07/2022    Breast cancer screen  07/14/2022    Pneumococcal 0-64 years Vaccine (2 of 4 - PPSV23) 08/17/2022    Colon cancer screen colonoscopy  10/26/2023    Cervical cancer screen  07/06/2025    DTaP/Tdap/Td vaccine (2 - Td or Tdap) 08/20/2030    COVID-19 Vaccine  Completed    Hepatitis C screen  Completed    HIV screen  Completed    Hepatitis A vaccine  Aged Out    Hib vaccine  Aged Out    Meningococcal (ACWY) vaccine  Aged Out       Subjective:     Review of Systems   Constitutional: Negative. HENT: Negative. Eyes: Negative. Respiratory: Negative. Cardiovascular: Negative. Gastrointestinal: Negative. Genitourinary: Negative. Musculoskeletal: Negative. Skin: Negative. Allergic/Immunologic: Negative for environmental allergies, food allergies and immunocompromised state. Neurological: Negative. Psychiatric/Behavioral: Negative. Objective:     Physical Exam  Vitals and nursing note reviewed. Constitutional:       General: She is awake. She is not in acute distress. Appearance: Normal appearance. She is obese. She is not ill-appearing, toxic-appearing or diaphoretic. HENT:      Head: Normocephalic and atraumatic.       Right Ear: External ear normal.      Left Ear: External ear normal.      Nose: Nose normal.      Mouth/Throat:      Mouth: Mucous membranes are moist.   Eyes:      Extraocular Movements: Extraocular movements intact. Conjunctiva/sclera: Conjunctivae normal.   Cardiovascular:      Rate and Rhythm: Normal rate and regular rhythm. Pulses: Normal pulses. Heart sounds: Normal heart sounds. No murmur heard. No friction rub. No gallop. Pulmonary:      Effort: Pulmonary effort is normal. No respiratory distress. Breath sounds: Normal breath sounds. No stridor. No wheezing, rhonchi or rales. Abdominal:      General: Abdomen is flat. Bowel sounds are normal.      Palpations: Abdomen is soft. Musculoskeletal:         General: Normal range of motion. Cervical back: Normal range of motion. Neurological:      General: No focal deficit present. Mental Status: She is alert and oriented to person, place, and time. Mental status is at baseline. Cranial Nerves: No cranial nerve deficit. Sensory: No sensory deficit. Motor: No weakness. Coordination: Coordination normal.      Gait: Gait normal.      Comments: Visual inspection:  Deformity/amputation: absent  Skin lesions/pre-ulcerative calluses: absent  Edema: right- negative, left- negative    Sensory exam:  Monofilament sensation: abnormal - multiple points of diminished sensation, bilateral great toe, 1st MTP bilaterally. (minimum of 5 random plantar locations tested, avoiding callused areas - > 1 area with absence of sensation is + for neuropathy)    Plus at least one of the following:  Pulses: normal,   Pinprick: Impaired  Proprioception: Intact  Vibration (128 Hz): Intact     Psychiatric:         Attention and Perception: Attention normal.         Mood and Affect: Mood and affect normal.         Speech: Speech normal.         Behavior: Behavior normal.         Thought Content:  Thought content normal.         Judgment: Judgment normal.       /84   Pulse 84   Temp 97 °F (36.1 °C)   Ht 5' 6\" (1.676 m)   Wt 264 lb (119.7 kg)   SpO2 96%   BMI 42.61 kg/m²     Assessment:       Diagnosis Orders 7/7/2022    Microalbumin, Ur     Standing Status:   Future     Standing Expiration Date:   7/7/2022    POCT glycosylated hemoglobin (Hb A1C)     DIABETES FOOT EXAM     Orders Placed This Encounter   Medications    Dulaglutide (TRULICITY) 2.75 OF/8.2BT SOPN     Sig: Inject 0.75 mg into the skin once a week     Dispense:  4 pen     Refill:  2    DISCONTD: losartan (COZAAR) 50 MG tablet     Sig: Take 1 tablet by mouth daily     Dispense:  30 tablet     Refill:  5    Insulin Degludec (TRESIBA FLEXTOUCH) 200 UNIT/ML SOPN     Sig: INJECT 16 UNITS INTO THE SKIN DAILY     Dispense:  3 pen     Refill:  0       Patient given educational materials - see patient instructions. Discussed use, benefit, and side effects of prescribed medications. All patientquestions answered. Pt voiced understanding. Reviewed health maintenance. Instructedto continue current medications, diet and exercise. Patient agreed with treatmentplan. Follow up as directed.      Electronically signed by Krystina Lazo MD on 8/22/2021 at 6:57 PM

## 2021-07-08 ENCOUNTER — HOSPITAL ENCOUNTER (OUTPATIENT)
Dept: PHYSICAL THERAPY | Facility: CLINIC | Age: 66
Setting detail: THERAPIES SERIES
Discharge: HOME | End: 2021-07-08
Payer: COMMERCIAL

## 2021-07-08 PROCEDURE — 97113 AQUATIC THERAPY/EXERCISES: CPT

## 2021-07-08 NOTE — FLOWSHEET NOTE
raises 10 Shoulder H.  abd/add 10   Marches 10 Shoulder IR/ER    Mini-squats 10 Rowing    4-way hip  10  Arm Circles    Hamstring curls 10 UT shrugs/rolls    Hip Circles/Fig 8  Scap squeezes    Ankle ROM  Diagonals 1/2    Lunges   Elbow flex/ext      Pron/Sup    Functional Exercise  Wrist AROM    Step      Wall Push-ups  Deep H20/    SLS  Bike 3m   Breast Stroke on Noodle  Hip abd/add 3m   Noodle Twist 10 Hip flex/ext    Noodle Push down  Hip IR/ER    Kickboard push/pull  Knee flex/ext      Push/pull on [employer] 5-10m   Other:    Specific Instructions for next treatment: Progress LE/UE ex with TVA contr as pt armen. Treatment Charges: Mins Units   []  Modalities     []  Ther Exercise     []  Manual Therapy     []  Ther Activities     [x]  Aquatics 30 2   []  Other       Assessment: [x] Progressing toward goals. Continued with aquatic ex per flow sheet above with good tolerance. Able to add to LE ex and initiate UE ex today without any increase in LB pain. Cues throughout for TA activation and avoid tensing up in her UT's. Addition of noodle twist to work on gentle LB ROM, and deep water hip/abd. States pain with ambulation decreased to 5-6/10 post treatment. [] No change. [] Other  STG: (to be met in 6 treatments)  1. ? Pain: 6/10  2. Patient to be independent with home exercise program as demonstrated by performance with correct form without cues.     LTG: (to be met in 16 treatments)  1. Patient will be able to walk > 10 minutes without sitting  2. Patient will improve Oswestry by >20%  3. Patient will Patient will stand > 15 minutes.       Pt. Education:  [x] Yes  [] No  [] Reviewed Prior HEP/Ed  Method of Education: [x] Verbal  [] Demo  [] Written  Comprehension of Education:  [x] Verbalizes understanding. [] Demonstrates understanding. [] Needs review. [] Demonstrates/verbalizes HEP/Ed previously given. Plan: [x] Continue per plan of care.    [x] Other: Sched re check with primary PT week of 7/19/21.       Time In: 11:00am            Time Out: 11:44 am    Electronically signed by:  Cuauhtemoc Alonso PTA

## 2021-07-12 ENCOUNTER — HOSPITAL ENCOUNTER (OUTPATIENT)
Dept: PHYSICAL THERAPY | Facility: CLINIC | Age: 66
Setting detail: THERAPIES SERIES
Discharge: HOME | End: 2021-07-12
Payer: COMMERCIAL

## 2021-07-12 PROCEDURE — 97113 AQUATIC THERAPY/EXERCISES: CPT

## 2021-07-12 ASSESSMENT — ENCOUNTER SYMPTOMS
EYES NEGATIVE: 1
RESPIRATORY NEGATIVE: 1
GASTROINTESTINAL NEGATIVE: 1

## 2021-07-12 NOTE — FLOWSHEET NOTE
[] [facility]       Physical Therapy [facility]       [place] [address]        [address]       Phone: [phone]       Fax: [phone] [] [facility]       Promotion at [address] [facility]       Phone: [phone]       Fax: [phone] [x] Ct. [address] [place] [address] for Health Promotion  [address]   Phone: [phone]   Fax:  [phone]     Physical Therapy Daily  Aquatic Treatment Note    Date:  2021  Patient Name:  Drake Em    :  1955  MRN: [id number]  Physician: CRYSTAL JeongW: [facility]  Medical Diagnosis: Chronic LBP without sciatica                  Rehab Codes: M54.5, G89.29  Onset Date: 2021                      Next 's appt.: Pending     Visit# / total visits:                       Cancels/No Shows: 0/0      Subjective:    Pain:  [x] Yes  [] No Location: LB Pain Rating: (0-10 scale) 7/10 with amb  Pain altered Tx:  [] No  [x] Yes  Action:  Comments: Pt arrives with continued pain and stated walking or standing for more than a few minutes causes her increased pain, such as walking to the pool from the parking lot. Pt stated she understands the theory behind building core strength and stability to reduce LB sx however states \"so far it's not working\". Objective:   Precaution:History of Breast Ca, mastectomy and radiation.      KEY  B = Belt G = Gloves N = Noodle   C = Cuffs K = Kickboard P = Paddles   CC = Cervical Collar L = Laps T = Theratube   DB = Dumbells M = Minutes W = Weights     Exercises/Activities  Warm-up/Amb /12 Dynamic Exercises 7/12   Forward 3L March    Sideways 3L Squat    Backwards 3L Retro HS curls      Retro SLR    Stretches  Braiding    Gastroc/Soleus  Heel to Toe amb    Hamstring  Toe amb    Hip flexor  Heel amb    Piriformis      SKTC      Pec Stretch      Post Deltoid  Static Exercises UE      Shoulder flex/ext 15   Static Exercises LE  Shoulder abd/add 15   Heel/toe raises 15 Shoulder H.  abd/add 15   Marches 15 Shoulder IR/ER    Mini-squats 15 Rowing 15   4-way hip  15  Arm Circles    Hamstring curls 15 UT shrugs/rolls    Hip Circles/Fig 8  Scap squeezes    Ankle ROM  Diagonals 1/2    Lunges   Elbow flex/ext      Pron/Sup    Functional Exercise  Wrist AROM    Step      Wall Push-ups  Deep H20/    SLS  Bike 4m   Breast Stroke on Noodle  Hip abd/add 4m   Noodle Twist 15 Hip flex/ext    Noodle Push down  Hip IR/ER    Kickboard push/pull  Knee flex/ext      Push/pull on [employer] 5m   Other:    Specific Instructions for next treatment: Progress LE/UE ex with TVA contr as pt armen. Treatment Charges: Mins Units   []  Modalities     []  Ther Exercise     []  Manual Therapy     []  Ther Activities     [x]  Aquatics 30 2   []  Other       Assessment: [x] Progressing toward goals. Continued with aquatic exercises per flow sheet above with good tolerance. Able to increase reps for all static therex with no increase in symptoms. Verbal cueing and demo provided on proper TVA activation and upright posture to ensure optimal benefits. Pt completed additional time in deep water to further promote lumbar decompression. Pt reported she \"felt good\" in the water with slightly decreased pain however pain resumes to 7/10 as soon as she gets out onto land. Pt is scheduled for re-eval with primary PT next week. [] No change. [] Other  STG: (to be met in 6 treatments)  1. ? Pain: 6/10  2. Patient to be independent with home exercise program as demonstrated by performance with correct form without cues.     LTG: (to be met in 16 treatments)  1. Patient will be able to walk > 10 minutes without sitting  2. Patient will improve Oswestry by >20%  3. Patient will Patient will stand > 15 minutes.       Pt.  Education:  [x] Yes  [] No  [] Reviewed Prior HEP/Ed  Method of Education: [x] Verbal  [] Demo  [] Written  Comprehension of Education:  [x] Verbalizes understanding. [] Demonstrates understanding. [] Needs review. [] Demonstrates/verbalizes HEP/Ed previously given. Plan: [x] Continue per plan of care. [x] Other: Sched re check with primary PT week of 7/19/21. Time In: 11:04 am            Time Out: 11:50 am    Electronically signed by:   Mary Gaines

## 2021-07-13 ENCOUNTER — TELEPHONE (OUTPATIENT)
Dept: ONCOLOGY | Age: 66
End: 2021-07-13

## 2021-07-13 DIAGNOSIS — D05.12 DUCTAL CARCINOMA IN SITU (DCIS) OF LEFT BREAST: Primary | ICD-10-CM

## 2021-07-13 NOTE — TELEPHONE ENCOUNTER
Called and spoke to Vance. I let her know that I see she is having her mammogram tomorrow and I wondered if she would like to reschedule her follow up with Dr. Carrie Yu. Vance relates that she is not following with Dr. Carrie Yu. She is established with another medical oncologist.  I let her know that I will note this in the chart. I let her know that I typically sign off from navigation about this time. I let her know that I am still available should she need any help in the future. Encouraged her to keep my name and number. Wished her well.

## 2021-07-14 ENCOUNTER — HOSPITAL ENCOUNTER (OUTPATIENT)
Dept: MAMMOGRAPHY | Age: 66
Discharge: HOME | End: 2021-07-16
Payer: COMMERCIAL

## 2021-07-14 DIAGNOSIS — D05.12 INTRADUCTAL CARCINOMA IN SITU OF LEFT BREAST: ICD-10-CM

## 2021-07-14 PROCEDURE — G0279 TOMOSYNTHESIS, MAMMO: HCPCS

## 2021-07-14 PROCEDURE — 77065 DX MAMMO INCL CAD UNI: CPT

## 2021-07-15 ENCOUNTER — HOSPITAL ENCOUNTER (OUTPATIENT)
Dept: PHYSICAL THERAPY | Facility: CLINIC | Age: 66
Setting detail: THERAPIES SERIES
Discharge: HOME | End: 2021-07-15
Payer: COMMERCIAL

## 2021-07-15 PROCEDURE — 97113 AQUATIC THERAPY/EXERCISES: CPT

## 2021-07-15 NOTE — FLOWSHEET NOTE
[] [facility]       Physical Therapy M[facility]       [place] [address]        [address]       Phone: [phone]       Fax: [phone] [] [facility]       Promotion at [address]       Phone: [phone]       Fax: [phone] [x] Ct. [address] [place] [address] Health Promotion  [address] [place] [address]   Phone: [phone]   Fax:  [phone]     Physical Therapy Daily  Aquatic Treatment Note    Date:  7/15/2021  Patient Name:  Thierno Quiros    :  1955  MRN: [id number]  Physician: MELISSA Tate                          [id number]: Angelo Noyola  [facility] Diagnosis: Chronic LBP without sciatica                  Rehab Codes: M54.5, G89.29  Onset Date: 2021                      Next 's appt.: Pending     Visit# / total visits: 10/16                      Cancels/No Shows: 0/0      Subjective:    Pain:  [x] Yes  [] No Location: LB Pain Rating: (0-10 scale) 6/10 with amb  Pain altered Tx:  [] No  [x] Yes  Action:  Comments: Pt states has not noticed any change in her LBP since starting aquatic therapy. Notes high pain with ambulation however no pain when in the water. Objective:   Precaution:History of Breast Ca, mastectomy and radiation.      KEY  B = Belt G = Gloves N = Noodle   C = Cuffs K = Kickboard P = Paddles   CC = Cervical Collar L = Laps T = Theratube   DB = Dumbells M = Minutes W = Weights     Exercises/Activities  Warm-up/Amb 7/12 Dynamic Exercises 7/12   Forward 3L March    Sideways 3L Squat    Backwards 3L Retro HS curls      Retro SLR    Stretches  Braiding    Gastroc/Soleus  Heel to Toe amb    Hamstring  Toe amb    Hip flexor  Heel amb    Piriformis      SKTC      Pec Stretch      Post Deltoid  Static Exercises UE      Shoulder flex/ext 15   Static Exercises LE  Shoulder abd/add 15   Heel/toe raises 15 Shoulder H.  abd/add 15   Marches 15 Shoulder IR/ER    Mini-squats 15 Rowing 15   4-way hip  15  Arm Circles Hamstring curls 15 UT shrugs/rolls    Hip Circles/Fig 8  Scap squeezes    Ankle ROM  Diagonals 1/2    Lunges   Elbow flex/ext      Pron/Sup    Functional Exercise  Wrist AROM    Step      Wall Push-ups  Deep H20/    SLS  Bike 4m   Breast Stroke on Noodle  Hip abd/add 4m   Noodle Twist 15 Hip flex/ext    Noodle Push down  Hip IR/ER    Kickboard push/pull  Knee flex/ext      Push/pull on [employer] 5m   Other:    Specific Instructions for next treatment: Progress LE/UE ex with TVA contr as pt armen. Treatment Charges: Mins Units   []  Modalities     []  Ther Exercise     []  Manual Therapy     []  Ther Activities     [x]  Aquatics 30 2   []  Other       Assessment: [x] Progressing toward goals. Cont with aquatic ex per flow sheet above with good tolerance. Held any progressions d/t increased pain level and focused on technique of current program. Cues provided for TA activation to aide in LB support as well as to keep noodle rotation stretch within comfortable ROM. Ended with deep water hang to promote pain relief and lumbar decompression. States after getting out of the pool, pain level is unchanged. [] No change. [] Other  STG: (to be met in 6 treatments)  1. ? Pain: 6/10  2. Patient to be independent with home exercise program as demonstrated by performance with correct form without cues.     LTG: (to be met in 16 treatments)  1. Patient will be able to walk > 10 minutes without sitting  2. Patient will improve Oswestry by >20%  3. Patient will Patient will stand > 15 minutes.       Pt. Education:  [x] Yes  [] No  [] Reviewed Prior HEP/Ed  Method of Education: [x] Verbal  [] Demo  [] Written  Comprehension of Education:  [x] Verbalizes understanding. [] Demonstrates understanding. [] Needs review. [] Demonstrates/verbalizes HEP/Ed previously given. Plan: [x] Continue per plan of care.    [] Other:       Time In: 11:00 am            Time Out: 11:50 am    Electronically signed by:  Jean-Pierre Escobedo Tarah, ISREAL

## 2021-07-20 ENCOUNTER — HOSPITAL ENCOUNTER (OUTPATIENT)
Dept: PHYSICAL THERAPY | Facility: CLINIC | Age: 66
Setting detail: THERAPIES SERIES
Discharge: HOME | End: 2021-07-20
Payer: COMMERCIAL

## 2021-07-20 PROCEDURE — 97530 THERAPEUTIC ACTIVITIES: CPT

## 2021-07-20 NOTE — PROGRESS NOTES
[] [facility] &  Therapy  [address]  [phone]  F: [phone] [] [address]  [address]   [address]  P: [phone]  F: [phone] [] [address]  [address]  P: [phone]  F: [phone] [x] [address]  P: [phone]  F: [phone] [] [address]  [facility]   [address]   P: [phone]  F: [phone]      Physical Therapy Daily Treatment Note? Progress report    Date:  2021  Patient Name:  Michelle Morel    :  1955  MRN: [id number]  Physician: Smiley Walker MD                          Insurance: Kiara Mcneal  Medical Diagnosis: Chronic LBP without sciatica                  Rehab Codes: M54.5, G89.29  Onset Date: 2021                      Next 's appt.: Pending    Visit# / total visits:     Cancels/No Shows: 0/0    Subjective:    Pain:  [x] Yes  [] No Location: low back Pain Rating: (0-10 scale) 6/10  Pain altered Tx:  [x] No  [] Yes  Action:  Comments: Pt states that she feel stronger since beginning PT but continues to have the same level of LBP associated with standing and walking. She had no pain during trail of Aquatic PT but it did not translate to improved pain or function. She is planning to pursue injections and will return to PT after.       Objective:  Modalities:   Precautions: Hx of Breast Cancer, Diabetes  Exercises:  Held today  Exercise Reps/ Time Weight/ Level Comments HEP Completed   PPT 2x10  5\"   X 2x daily x   PPT with march 2x10    X     PPT ALT UEs 2x10       PPT with SLR          PPT with S/L bicycle 2x5       Isometric Hip flex 5x5 sec ea     X  -   Angry Cat 2x10     X  x   Seated flexion 2x30\" hold  Attempted with ball   x    Bridge Roll up LTR  2x10       x   Nu-step 10 min L5    x   Reviewed HEP discussed role of PT after injections. Oswestry Score:   Initial Disability 50%  Current Disability 40%      Treatment Charges: Mins Units   []  Modalities     []  Ther Exercise     []  Manual Therapy     [x]  Ther Activities 20 1   []  Aquatics     []  Vasocompression     []  Other: Dry Needling     Total Treatment time 25 1       Assessment: [] Progressing toward goals. .  [x] No change. Patient has improved Oswestry score by 10% but reports no change in pain. She feels stronger but has continued pain limited standing and walking to less than 5 minutes. She is going to see another oncologist due to an irregular scan and also plans to pursue injections. She would like to resume PT after injections. [] Other:  [x] Patient would continue to benefit from skilled physical therapy services in order to: Improve back pain, core strength and function. STG/LTG:  STG: (to be met in 6 treatments)  1. ? Pain: 6/10. Met  2. Patient to be independent with home exercise program as demonstrated by performance with correct form without cues. Met     LTG: (to be met in 16 treatments)  1. Patient will be able to walk > 10 minutes without sitting. Not Met  2. Patient will improve Oswestry by >20%,. Not Met  3. Patient will Patient will stand > 15 minutes. Not Met. Pt. Education:  [x] Yes  [] No  [x] Reviewed Prior HEP/Ed  Method of Education: [] Verbal  [] Demo  [] Written  Comprehension of Education:  [] Verbalizes understanding. [] Demonstrates understanding. [x] Needs review. [] Demonstrates/verbalizes HEP/Ed previously given. Plan: [] Continue current frequency toward long and short term goals. [x] Specific Instructions for subsequent treatments: Chart will be placed on hold for up to 1 month, patient is planning to get injections then wants to resume PT.       Time In: 11:00a,            Time Out: 11:30am    Electronically signed by: Duane Beam, PT

## 2021-07-21 ENCOUNTER — TELEPHONE (OUTPATIENT)
Dept: ONCOLOGY | Age: 66
End: 2021-07-21

## 2021-07-21 NOTE — TELEPHONE ENCOUNTER
Spoke with patient in regard to survivorship referral. Patient has transferred her care to an outside medical oncologist and states she will be seeing another surgeon for more breast surgery. At this time she has not yet completed treatment and because she is seeing an outside oncologist, the referral will be cancelled at this time.

## 2021-07-26 ENCOUNTER — TELEPHONE (OUTPATIENT)
Dept: FAMILY MEDICINE CLINIC | Age: 66
End: 2021-07-26

## 2021-07-26 DIAGNOSIS — I10 ESSENTIAL HYPERTENSION: Primary | ICD-10-CM

## 2021-07-26 RX ORDER — METOPROLOL SUCCINATE 25 MG/1
25 TABLET, EXTENDED RELEASE ORAL DAILY
Qty: 30 TABLET | Refills: 5 | Status: SHIPPED | OUTPATIENT
Start: 2021-07-26 | End: 2021-07-29 | Stop reason: DRUGHIGH

## 2021-07-26 NOTE — TELEPHONE ENCOUNTER
I spoke with the patient she states her blood pressures have been in the high 140s over the high 80s. She also has an elevated heart rate at rest in the 80s to 90s and heart rate quickly jumps in the 100s when she does any activity. Advised the patient I would like to add metoprolol XL 25 mg daily. She is to continue on losartan as well at 50 mg daily. She is to call us with her blood pressure readings later in the week.

## 2021-07-26 NOTE — TELEPHONE ENCOUNTER
Patient called in regards to her new prescription of losartan. Patient's blood pressure and heart rate are up again, and she would like to know what the next steps are. Patient states she is open to an increased dose or trying a new medication. Please advise, thank you!

## 2021-07-29 ENCOUNTER — TELEPHONE (OUTPATIENT)
Dept: FAMILY MEDICINE CLINIC | Age: 66
End: 2021-07-29

## 2021-07-29 DIAGNOSIS — E11.9 TYPE 2 DIABETES MELLITUS WITHOUT COMPLICATION, WITH LONG-TERM CURRENT USE OF INSULIN (HCC): ICD-10-CM

## 2021-07-29 DIAGNOSIS — I10 ESSENTIAL HYPERTENSION: ICD-10-CM

## 2021-07-29 DIAGNOSIS — Z79.4 TYPE 2 DIABETES MELLITUS WITHOUT COMPLICATION, WITH LONG-TERM CURRENT USE OF INSULIN (HCC): ICD-10-CM

## 2021-07-29 RX ORDER — LOSARTAN POTASSIUM 50 MG/1
100 TABLET ORAL DAILY
Qty: 30 TABLET | Refills: 5 | Status: SHIPPED
Start: 2021-07-29 | End: 2021-07-31

## 2021-07-29 RX ORDER — METOPROLOL SUCCINATE 25 MG/1
50 TABLET, EXTENDED RELEASE ORAL DAILY
Qty: 30 TABLET | Refills: 5 | Status: SHIPPED
Start: 2021-07-29 | End: 2021-08-11 | Stop reason: SINTOL

## 2021-08-09 ENCOUNTER — TELEPHONE (OUTPATIENT)
Dept: FAMILY MEDICINE CLINIC | Age: 66
End: 2021-08-09

## 2021-08-09 NOTE — TELEPHONE ENCOUNTER
Please call patient and reconcile her medication list just to make sure we know what medications she is taking for sure.

## 2021-08-09 NOTE — TELEPHONE ENCOUNTER
----- Message from Enoch Reyes sent at 8/9/2021 11:03 AM EDT -----  Subject: Message to Provider    QUESTIONS  Information for Provider? Patient called to inform Dr. Joe Mariano of how her   new medication is going, she states she had to stop taking the new   medicines because it gave her diarrhea, and felt like she was doing a   colonoscopy prep, so she went to her old medication and her BP is normal   and her heart rate is normal.  ---------------------------------------------------------------------------  --------------  CALL BACK INFO  What is the best way for the office to contact you? OK to leave message on   voicemail  Preferred Call Back Phone Number? [phone]  ---------------------------------------------------------------------------  --------------  SCRIPT ANSWERS  Relationship to Patient?  Self

## 2021-08-10 RX ORDER — ATENOLOL 50 MG/1
50 TABLET ORAL DAILY
COMMUNITY
End: 2021-11-16 | Stop reason: SDUPTHER

## 2021-08-10 RX ORDER — AMLODIPINE BESYLATE 5 MG/1
5 TABLET ORAL DAILY
COMMUNITY
End: 2021-08-15

## 2021-08-10 NOTE — TELEPHONE ENCOUNTER
This is what she is taking she said you stwitched her to the others her bp was running high so she called in you told her to double both new ones and she did and he bp was still hihg and she couldn't leave the bathroom so she went back to taking the atenolol and amlodipine and her pressures and hr are good please advise I corrected her medicine list tough

## 2021-08-15 RX ORDER — AMLODIPINE BESYLATE 5 MG/1
TABLET ORAL
Qty: 90 TABLET | Refills: 1 | Status: SHIPPED | OUTPATIENT
Start: 2021-08-15 | End: 2022-05-13 | Stop reason: SDUPTHER

## 2021-08-17 DIAGNOSIS — G47.33 OSA (OBSTRUCTIVE SLEEP APNEA): Primary | ICD-10-CM

## 2021-08-17 DIAGNOSIS — I10 ESSENTIAL HYPERTENSION: ICD-10-CM

## 2021-08-17 NOTE — TELEPHONE ENCOUNTER
----- Message from Aleyda Brian sent at 8/17/2021 10:27 AM EDT -----  Subject: Message to Provider    QUESTIONS  Information for Provider? patient is requesting a prescription on a CPAP   machine, she needs it filled out and faxed over to [facility], she   needs ASAP  ---------------------------------------------------------------------------  --------------  CALL BACK INFO  What is the best way for the office to contact you? OK to leave message on   voicemail  Preferred Call Back Phone Number? [phone]  ---------------------------------------------------------------------------  --------------  SCRIPT ANSWERS  Relationship to Patient?  Self

## 2021-08-18 ENCOUNTER — TELEPHONE (OUTPATIENT)
Dept: SPIRITUAL SERVICES | Age: 66
End: 2021-08-18

## 2021-08-18 RX ORDER — METOPROLOL SUCCINATE 25 MG/1
TABLET, EXTENDED RELEASE ORAL
Qty: 30 TABLET | Refills: 5 | OUTPATIENT
Start: 2021-08-18

## 2021-08-18 NOTE — TELEPHONE ENCOUNTER
Informed patient that her order has been faxed to [address] [facility] at number provided.

## 2021-08-18 NOTE — TELEPHONE ENCOUNTER
Writer called to introduce herself and to ask how Patient was doing. Patient shared that she changed doctors and is \"waiting on results. \" She thanked writer for the call.     Electronically signed by Chan Blackman, Oncology [facility], [address]  8/18/2021  5:02 PM

## 2021-08-18 NOTE — TELEPHONE ENCOUNTER
Lvm asking patient to call the office so we can take care of her CPAP supplies.   Fax number in encounter

## 2021-08-18 NOTE — TELEPHONE ENCOUNTER
The pressure on the machine is 6.0cm h20 and it uses a charline humidifier is this what your looking for she states she is in need of a new machine no other facial or head gear she gets that somewhere else

## 2021-08-18 NOTE — TELEPHONE ENCOUNTER
Please send CPAP orders to place where patient prefers. Please also ask her more details about cpap machine including what pressure she is currently one and what type of facial appliance she uses.

## 2021-08-20 ENCOUNTER — TELEPHONE (OUTPATIENT)
Dept: FAMILY MEDICINE CLINIC | Age: 66
End: 2021-08-20

## 2021-08-20 NOTE — TELEPHONE ENCOUNTER
Cathy from [facility] called in requesting ov notes about switching her cpap machine please advise thank you!     Once completed fax to [phone]

## 2021-09-13 ENCOUNTER — HOSPITAL ENCOUNTER (OUTPATIENT)
Dept: RADIATION ONCOLOGY | Age: 66
Discharge: HOME | End: 2021-09-13
Attending: RADIOLOGY
Payer: COMMERCIAL

## 2021-09-13 VITALS
SYSTOLIC BLOOD PRESSURE: 143 MMHG | TEMPERATURE: 97 F | BODY MASS INDEX: 41.97 KG/M2 | HEART RATE: 82 BPM | OXYGEN SATURATION: 97 % | DIASTOLIC BLOOD PRESSURE: 89 MMHG | WEIGHT: 260 LBS | RESPIRATION RATE: 12 BRPM

## 2021-09-13 DIAGNOSIS — D05.12 DUCTAL CARCINOMA IN SITU (DCIS) OF LEFT BREAST: Primary | ICD-10-CM

## 2021-09-13 PROCEDURE — 99212 OFFICE O/P EST SF 10 MIN: CPT | Performed by: RADIOLOGY

## 2021-09-13 PROCEDURE — 99213 OFFICE O/P EST LOW 20 MIN: CPT | Performed by: RADIOLOGY

## 2021-09-13 NOTE — PROGRESS NOTES
Yeni Flick  9/13/2021  2:32 PM      Vitals:    09/13/21 1400   BP: (!) 143/89   Pulse: 82   Resp: 12   Temp: 97 °F (36.1 °C)   SpO2: 97%    :  Patient Currently in Pain: Denies             Wt Readings from Last 1 Encounters:   09/13/21 260 lb (117.9 kg)                Current Outpatient Medications:     amLODIPine (NORVASC) 5 MG tablet, TAKE 1 TABLET BY MOUTH EVERY DAY, Disp: 90 tablet, Rfl: 1    atenolol (TENORMIN) 50 MG tablet, Take 50 mg by mouth daily, Disp: , Rfl:     metFORMIN (GLUCOPHAGE) 500 MG tablet, TAKE 1 TABLET BY MOUTH TWICE A DAY WITH MEALS, Disp: 180 tablet, Rfl: 1    Dulaglutide (TRULICITY) 8.95 HL/7.2RT SOPN, Inject 0.75 mg into the skin once a week, Disp: 4 pen, Rfl: 2    Insulin Degludec (TRESIBA FLEXTOUCH) 200 UNIT/ML SOPN, INJECT 16 UNITS INTO THE SKIN DAILY, Disp: 3 pen, Rfl: 0    pantoprazole (PROTONIX) 40 MG tablet, Take 1 tablet by mouth daily, Disp: 90 tablet, Rfl: 1    Insulin Pen Needle 32G X 4 MM MISC, 1 each by Does not apply route daily, Disp: 100 each, Rfl: 3    Multiple Vitamin (MULTI-VITAMIN DAILY PO), Take by mouth daily, Disp: , Rfl:     aspirin 81 MG tablet, Take 81 mg by mouth daily, Disp: , Rfl:     Krill Oil 350 MG CAPS, Take 500 mg by mouth , Disp: , Rfl:     Immunizations:    Influenza status:    [x]   Current   []   Patient declined    Pneumococcal status:  [x]   Current  []   Patient declined  Covid status:   [x]  Dose #1:                     []  Dose #2:               []   Patient declined    Smoking Status:    [] Smoker - PPD:   [x] Nonsmoker - Quit Date: 2019              [] Never a smoker         Hormone:  Lupron []   Last dose given:           Next dose due:   Eligard []   Last dose given:           Next dose due:   Aromatase Inhibitors []   Medication name:   N/A:  [x]   pt unable to tolerate and meds were d/c'd           *BREAST Patient only:    Lymphedema Eval:   [] left arm      [] right arm  Location:     Measurement (cm)    Upper Bicep :    Lower Bicep : FALLS RISK SCREEN  Instructions:  Assess the patient and enter the appropriate indicators that are present for fall risk identification. Total the numbers entered and assign a fall risk score from Table 2.  Reassess patient at a minimum every 12 weeks or with status change. Assessment   Date  9/13/2021     1. Mental Ability: confusion/cognitively impaired 0     2. Elimination Issues: incontinence, frequency 0       3. Ambulatory: use of assistive devices (walker, cane, off-loading devices),        attached to equipment (IV pole, oxygen) 0     4. Sensory Limitations: dizziness, vertigo, impaired vision 0     5. Age less than 65        0     6. Age 72 or greater 1     7. Medication: diuretics, strong analgesics, hypnotics, sedatives,        antihypertensive agents 3   8. Falls:  recent history of falls within the last 3 months (not to include slipping or        tripping) 0   TOTAL 4    If score of 4 or greater was education given? Yes           TABLE 2   Risk Score Risk Level Plan of Care   0-3 Little or  No Risk 1. Provide assistance as indicated for ambulation activities  2. Reorient confused/cognitively impaired patient  3. Chair/bed in low position, stretcher/bed with siderails up except when performing patient care activities  5. Educate patient/family/caregiver on falls prevention  6.  Reassess in 12 weeks or with any noted change in patient condition which places them at a risk for a fall   4-6 Moderate Risk 1. Provide assistance as indicated for ambulation activities  2. Reorient confused/cognitively impaired patient  3. Chair/bed in low position, stretcher/bed with siderails up except when performing patient care activities  4. Educate patient/family/caregiver on falls prevention     7 or   Higher High Risk 1. Place patient in easily observable treatment room  2. Patient attended at all times by family member or staff  3.   Provide assistance as indicated for ambulation activities  4. Reorient confused/cognitively impaired patient  5. Chair/bed in low position, stretcher/bed with siderails up except when performing patient care activities  6. Educate patient/family/caregiver on falls prevention         PLAN: Patient is seen today in follow up. Dr Walsh Forward notified and examined pt.  PT to f/u with TYLOR Garcia RN

## 2021-09-14 NOTE — PROGRESS NOTES
[address]            Radiation Oncology          [address] [facility], [address]        Brent Porch: [phone]        F: [phone]       [facility]       Radiation Oncology   [facility] [address] [place] [address], [address] [facility]       Brent Porch: [phone]       F: [phone]       [email or web address]      Date of Service: 2021     Location:  [address] [place] [address],   [address] [facility], Critical access hospital   [phone]        RADIATION ONCOLOGY FOLLOW UP NOTE    Patient ID:   Isabel Trimble  : 1955   MRN: [id number]    DIAGNOSIS:  Cancer Staging  Ductal carcinoma in situ (DCIS) of left breast  Staging form: Breast, AJCC 8th Edition  - Pathologic stage from 11/10/2020: Stage 0 (pTis (DCIS), pN0, cM0, G2, ER+, MI+, HER2: Not Assessed) - Signed by Gladys Sky MD on 2021    -s/p L Lump 12/15/20   -s/p RT 4256cGy 3/4/21  - discontinued Arimedex    INTERVAL HISTORY:   Isabel Trimble is a 61 y.o. [place] Graven female with a history of a left breast DCIS for which she underwent lumpectomy followed by adjuvant radiation therapy is here today for a follow-up visit approximately 6 months after finishing. Overall patient has been doing well in the interim. She denies any issues with skin irritation, swelling, tenderness, or nipple discharge from the breast.  She does note some sharp shooting pains as well as some dull pains on the left lateral side of her breast.  She otherwise denies any range of motion or swelling in her arm. She did have some trouble with her Arimidex endocrine therapy as it was causing significant joint aches and therefore it was discontinued. Patient seeks a second opinion with Dr. Russell Jones at [facility] for endocrine therapy and has been recommended to continue close surveillance until she is ready to reattempt another drug.   Patient also had a recent mammogram of the left breast on July 14, 2021 which showed posttreatment changes with recommended bilateral follow-up in September. Patient did meet with Dr. Greta Maldonado who has recommended she undergo a MRI of the breasts which has been ordered but not scheduled. Patient otherwise shows notes still having significant fatigue though she has been working on weight loss and has lost 9 pounds through physical therapy and water aerobics. She denies any other symptoms at this time headaches, dizziness, chest pain, abdominal pain, nausea, bony pain, swelling, or bleeding. MEDICATIONS:    Current Outpatient Medications:     amLODIPine (NORVASC) 5 MG tablet, TAKE 1 TABLET BY MOUTH EVERY DAY, Disp: 90 tablet, Rfl: 1    atenolol (TENORMIN) 50 MG tablet, Take 50 mg by mouth daily, Disp: , Rfl:     metFORMIN (GLUCOPHAGE) 500 MG tablet, TAKE 1 TABLET BY MOUTH TWICE A DAY WITH MEALS, Disp: 180 tablet, Rfl: 1    Dulaglutide (TRULICITY) 4.39 SI/0.2HH SOPN, Inject 0.75 mg into the skin once a week, Disp: 4 pen, Rfl: 2    Insulin Degludec (TRESIBA FLEXTOUCH) 200 UNIT/ML SOPN, INJECT 16 UNITS INTO THE SKIN DAILY, Disp: 3 pen, Rfl: 0    pantoprazole (PROTONIX) 40 MG tablet, Take 1 tablet by mouth daily, Disp: 90 tablet, Rfl: 1    Insulin Pen Needle 32G X 4 MM MISC, 1 each by Does not apply route daily, Disp: 100 each, Rfl: 3    Multiple Vitamin (MULTI-VITAMIN DAILY PO), Take by mouth daily, Disp: , Rfl:     aspirin 81 MG tablet, Take 81 mg by mouth daily, Disp: , Rfl:     Krill Oil 350 MG CAPS, Take 500 mg by mouth , Disp: , Rfl:     ALLERGIES:  Allergies   Allergen Reactions    Augmentin [Amoxicillin-Pot Clavulanate] Nausea And Vomiting    Bactrim [Sulfamethoxazole-Trimethoprim] Nausea And Vomiting    Vancomycin Rash    Dolobid [Diflunisal] Nausea And Vomiting         REVIEW OF SYSTEMS:    A full 14 point review of systems was performed and assessed and found to be negative except as noted above.       PHYSICAL EXAMINATION:    CHAPERONE: Not Required    ECO Asymptomatic    VITAL SIGNS: BP (!) 143/89   Pulse 82   Temp 97 °F (36.1 °C)   Resp 12   Wt 260 lb (117.9 kg)   SpO2 97%   BMI 41.97 kg/m²   GENERAL:  General appearance is that of a well-nourished, well-developed in no apparent distress. HEENT: Normocephalic, atraumatic, EOMI, moist mucosa, no erythema. NECK:  No adenopathy or a palpable thyroid mass, trachea is midline. LYMPHATICS: No cervical, supraclavicular adenopathy. HEART:  Normal rate and regular rhythm, normal heart sounds. LUNGS:  Pulmonary effort normal. Normal breath sounds. ABDOMEN:  Soft, nontender, non distended. EXTREMITIES:  No edema. No calf tenderness. MSK:  No spinal tenderness. Normal ROM. NEUROLOGICAL: Alert and oriented. Strength and sensation intact bilaterally. No focal deficits. PSYCH: Mood normal, behavior normal.  SKIN: No erythema, no desquamation.   BREAST: Deferred       LABS:  WBC   Date Value Ref Range Status   2021 9.1 3.5 - 11.0 k/uL Final     Segs Absolute   Date Value Ref Range Status   2021 6.00 1.8 - 7.7 k/uL Final     Hemoglobin   Date Value Ref Range Status   2021 14.0 12.0 - 16.0 g/dL Final     Platelets   Date Value Ref Range Status   2021 244 140 - 450 k/uL Final     No results found for: , CEA  No results found for: PSA    IMAGIN21  Impression   Post treatment changes left breast within expected limits.  Prior asymmetry   left breast corresponding to a seroma has resolved.  Follow-up bilateral   mammogram in 2021 is advised which should be performed as a   diagnostic mammogram as part of a post lumpectomy monitoring protocol.       BI-RADS 3           ASSESSMENT AND PLAN:  Cristin Burns is a 61 y.o. female with a Cancer Staging  Ductal carcinoma in situ (DCIS) of left breast  Staging form: Breast, AJCC 8th Edition  - Pathologic stage from 11/10/2020: Stage 0 (pTis (DCIS), pN0, cM0, G2, ER+, ME+, HER2: Not Assessed) - Signed by Abi Winkler MD on 1/5/2021  . Patient comes in today for a follow visit approximately 6 months after completion of her adjuvant radiation therapy. Overall she has done well with no significant residual toxicities from her radiation treatments remaining. Patient does note some tenderness along the left lateral aspect of her breast which I feel may be attributed to lymphedema and therefore we will refer her to the lymphedema clinic as she has not previously been seen by them. Patient will also be getting a MRI of her breasts for surveillance which has been ordered by Dr. Alyssa De La Garza. We encourage patient to follow-up on scheduling that appointment. Patient will also be seeing Dr. Js Luna next month to discuss further the role of endocrine therapy which we discussed with her as there are many options available. Patient otherwise is encouraged to maintain activity and healthy diet as she has been. Given that patient is seeing 2 other providers we offer the patient the option to see us on an as-needed basis to limit her follow-up appointments. Patient was given her contact information and told that should she have questions concerns or changes in symptoms she could certainly contact us at any time. Patient was in agreement with my recommendations. All questions were answered to their satisfaction. Patient was advised to contact us anytime should they have any questions or concerns. Electronically signed by Abi Winkler MD on 9/14/2021 at 1:59 PM        Medications Prescribed:   New Prescriptions    No medications on file       Orders:   Orders Placed This Encounter   Procedures   [address]       CC:  Patient Care Team:  Carlos Fonseca MD as PCP - General (Family Medicine)  Carlos Fonseca MD as PCP - [facility]     Total time spent on this case on the day of encounter is more than 30 minutes.  This time includes combination of one or more of the following - review of necessary tests, review of pertinent medical records from the EMR, performing medically appropriate examination and evaluation, counseling and educating the patient/family/caregiver, ordering necessary medical tests, procedures etc., documenting the clinical information in the electronic medical record, care coordination, referring and communicating with other health care providers and interpretation of results independently. This note is created with the assistance of a speech recognition program.  While intending to generate a document that actually reflects the content of the visit, the document can still have some errors including those of syntax and sound a like substitutions which may escape proof reading. It such instances, actual meaning can be extrapolated by contextual diversion.

## 2021-09-20 DIAGNOSIS — E11.9 TYPE 2 DIABETES MELLITUS WITHOUT COMPLICATION, WITH LONG-TERM CURRENT USE OF INSULIN (HCC): ICD-10-CM

## 2021-09-20 DIAGNOSIS — Z79.4 TYPE 2 DIABETES MELLITUS WITHOUT COMPLICATION, WITH LONG-TERM CURRENT USE OF INSULIN (HCC): ICD-10-CM

## 2021-09-21 NOTE — TELEPHONE ENCOUNTER
Next Visit Date:  Future Appointments   Date Time Provider Don Oliverosi   10/7/2021 11:30 AM [facility] [address] Maintenance   Topic Date Due    Shingles Vaccine (1 of 2) Never done    COVID-19 Vaccine (3 - Moderna risk 3-dose series) 04/29/2021    A1C test (Diabetic or Prediabetic)  07/14/2021    Diabetic retinal exam  08/21/2021    Low dose CT lung screening  10/28/2021    Diabetic microalbuminuria test  11/30/2021    Lipid screen  04/27/2022    Potassium monitoring  05/20/2022    Creatinine monitoring  05/20/2022    Diabetic foot exam  07/07/2022    Breast cancer screen  07/14/2022    Pneumococcal 0-64 years Vaccine (2 of 4 - PPSV23) 08/17/2022    Colon cancer screen colonoscopy  10/26/2023    Cervical cancer screen  07/06/2025    DTaP/Tdap/Td vaccine (2 - Td or Tdap) 08/20/2030    Flu vaccine  Completed    Hepatitis C screen  Completed    HIV screen  Completed    Hepatitis A vaccine  Aged Out    Hib vaccine  Aged Out    Meningococcal (ACWY) vaccine  Aged Out       Hemoglobin A1C (%)   Date Value   04/14/2021 10.9   11/30/2020 12.9   07/06/2020 14.0             ( goal A1C is < 7)   Microalb/Crt.  Ratio (mcg/mg creat)   Date Value   11/30/2020 CANNOT BE CALCULATED     LDL Cholesterol (mg/dL)   Date Value   04/27/2021 108   02/18/2020 140 (H)       (goal LDL is <100)   AST (U/L)   Date Value   05/20/2021 23     ALT (U/L)   Date Value   05/20/2021 24     BUN (mg/dL)   Date Value   05/20/2021 13     BP Readings from Last 3 Encounters:   09/13/21 (!) 143/89   07/07/21 122/84   06/07/21 135/85          (goal 120/80)    All Future Testing planned in [facility]  Lab Frequency Next Occurrence   COVID-19 Once 12/09/2020   Lipid, Fasting Once 11/07/2021   Comprehensive Metabolic Panel, Fasting Once 11/07/2021   Microalbumin, Ur Once 11/07/2021   CBC With Auto Differential     Comprehensive Metabolic Panel                 Patient Active Problem List:     Essential hypertension     Muscle cramps     Type 2 diabetes mellitus without complication (HCC)     Elevated lipoprotein(a)     Severe obesity (BMI >= 40) (HCC)     Ductal carcinoma in situ (DCIS) of left breast

## 2021-09-22 RX ORDER — DULAGLUTIDE 0.75 MG/.5ML
0.75 INJECTION, SOLUTION SUBCUTANEOUS WEEKLY
Qty: 3 PEN | Refills: 2 | Status: SHIPPED | OUTPATIENT
Start: 2021-09-22 | End: 2021-12-13 | Stop reason: SDUPTHER

## 2021-09-26 RX ORDER — PANTOPRAZOLE SODIUM 40 MG/1
TABLET, DELAYED RELEASE ORAL
Qty: 90 TABLET | Refills: 1 | Status: SHIPPED | OUTPATIENT
Start: 2021-09-26 | End: 2022-02-10 | Stop reason: SDUPTHER

## 2021-10-07 ENCOUNTER — TELEMEDICINE (OUTPATIENT)
Dept: FAMILY MEDICINE CLINIC | Age: 66
End: 2021-10-07

## 2021-10-07 DIAGNOSIS — E11.9 TYPE 2 DIABETES MELLITUS WITHOUT COMPLICATION, WITH LONG-TERM CURRENT USE OF INSULIN (HCC): ICD-10-CM

## 2021-10-07 DIAGNOSIS — G89.29 CHRONIC BILATERAL LOW BACK PAIN WITHOUT SCIATICA: Primary | ICD-10-CM

## 2021-10-07 DIAGNOSIS — M54.50 CHRONIC BILATERAL LOW BACK PAIN WITHOUT SCIATICA: Primary | ICD-10-CM

## 2021-10-07 DIAGNOSIS — Z79.4 TYPE 2 DIABETES MELLITUS WITHOUT COMPLICATION, WITH LONG-TERM CURRENT USE OF INSULIN (HCC): ICD-10-CM

## 2021-10-07 PROCEDURE — 99999 PR OFFICE/OUTPT VISIT,PROCEDURE ONLY: CPT | Performed by: FAMILY MEDICINE

## 2021-10-07 NOTE — PROGRESS NOTES
The patient only needed a referral we had talked about in the past.  This was not billed because there was no further discussion. Referral placed, patient reminded to get labs done.

## 2021-10-11 ENCOUNTER — TELEPHONE (OUTPATIENT)
Dept: ONCOLOGY | Age: 66
End: 2021-10-11

## 2021-10-11 DIAGNOSIS — Z87.891 PERSONAL HISTORY OF NICOTINE DEPENDENCE: Primary | ICD-10-CM

## 2021-10-11 NOTE — TELEPHONE ENCOUNTER
Our records indicate that your patient is coming due for their annual lung cancer screening follow up testing. For your convenience, we have pended the order for the scan for you. If you do not agree with the need for the test, please cancel the order and let us know. Sincerely,    [address] [place] [address] Screening Program    Auto printed reminder letter sent to patient.

## 2021-10-12 ENCOUNTER — HOSPITAL ENCOUNTER (OUTPATIENT)
Dept: OCCUPATIONAL THERAPY | Age: 66
Setting detail: THERAPIES SERIES
Discharge: HOME | End: 2021-10-12
Payer: COMMERCIAL

## 2021-10-12 PROCEDURE — 97166 OT EVAL MOD COMPLEX 45 MIN: CPT

## 2021-10-12 PROCEDURE — 97535 SELF CARE MNGMENT TRAINING: CPT

## 2021-10-12 NOTE — CONSULTS
TREATMENT LOCATION:   [] [facility] [address] [place] [address] [place] [address]: [phone]  F: [phone] [x] [address]: [phone]  F: [phone]      Lymphedema Services - Initial Evaluation for Upper Extremity    Date:  10/12/2021  Patient: Luis A Bardales  : 1955             MRN: [id number]  Referring Physician: Yary Kasper MD       Phone: [phone]  Fax: [phone]  Insurance:  [employer]- 1 / Jorgito Englishks after Eval - $20.00 Copay, PreAuth after eval by calling [employer]  (Outside of services area). [phone]    Medical Diagnosis: Ductal carcinoma in situ (DCIS) of left breast  Rehab Codes: I89.0, D05.12    Onset Date: 21  Visit# / total visits: 1/3  POC UPDATE DUE AT VISIT: 5    Allergies:   [x] Medications    Medications: See charted information in Epic  Past Medical History: See charted information in Epic     Restrictions: No blood pressure/blood draw in: L UE  Fall Risk:   [x] No    [] Yes   If yes, intervention:       Overview: Patient is a 61year old female referred to the [facility]ema Clinic with a diagnosis of left Upper Extremity Lymphedema. Pt notes on arrival that she is not certain she even has lymphedema. She started to get a shooting pain on her axilla. Pain in lower back started in 2021 - possibly bc of Anastrozole, on it for 1 month and then discontinued. Pt did PT services ( aquatic and regular, however she is not able to get the pain to subside.)     PHYSICIAN NOTES:     DIAGNOSIS:  Cancer Staging  Ductal carcinoma in situ (DCIS) of left breast  Staging form: Breast, AJCC 8th Edition  - Pathologic stage from 11/10/2020: Stage 0 (pTis (DCIS), pN0, cM0, G2, ER+, CA+, HER2: Not Assessed) - Signed by Sowmya López MD on 2021     INTERVAL HISTORY:   Luis A Bardales is a 63 y.o. female.      2020 -bilateral screening mammograms  There are scattered bilateral fibroglandular densities. Jewell Clement is a small   cluster of indeterminate calcifications seen in the middle depth of the left   inferior retroareolar breast. Jewell Clement is a suspicion of a 3 mm vaguely   spiculated mammographic asymmetry seen in the middle depth of the left   superolateral breast.       No right breast findings      November 10, 2020 -breast biopsy  Left breast, 6:00, biopsy:           Ductal carcinoma in situ, intermediate grade. Microcalcifications present. Estrogen receptor, positive (>95%). Progestin receptor, positive (>95%). Clinical Information   Pre-op Diagnosis:  LEFT BREAST CALCIFICATIONS   Operative Findings:  LEFT 6:00   Operation Performed:  STEREOTACTIC LEFT BREAST BIOPSY     December 15, 2020 -left breast needle localized lumpectomy  -- Diagnosis --   1.  BREAST TISSUES, EXCISION, WITH EXAMINATION OF MARGINS (LEFT;   ADDITIONAL INFERIOR POSTERIOR MARGIN):        -RARE FOCUS OF ATYPICAL DUCTAL HYPERPLASIA.      -NO IN SITU OR INVASIVE NEOPLASM IDENTIFIED (MARGINS NEGATIVE).      -SCLEROSING ADENOSIS WITH CALCIFICATION. 2.  BREAST TISSUES, EXCISION, WITH EXAMINATION OF MARGINS (ADDITIONAL   SUPERIOR ANTERIOR MARGIN):        -RARE FOCUS OF ATYPICAL DUCTAL HYPERPLASIA.      -NO IN SITU OR INVASIVE NEOPLASM IDENTIFIED (MARGINS NEGATIVE). 3.  BREAST TISSUES, EXCISION, WITH EXAMINATION OF MARGIN (ADDITIONAL   LATERAL):        -FATTY TISSUES WITHOUT NEOPLASM. 4.  BREAST TISSUES, EXCISION, WITH EXAMINATION OF MARGINS (ADDITIONAL   INFERIOR POSTERIOR):        -FIBROCYSTIC CHANGE WITH DUCTAL HYPERPLASIA.          -NO IN SITU OR INVASIVE NEOPLASM IDENTIFIED (MARGINS NEGATIVE).      Clinical Information   Pre-op Diagnosis:  LEFT BREAST DCIS   Operative Findings:  LEFT BREAST MASS  LONG BLACK SUTURE LATERAL   MARGIN, SHORT DOUBLE BLACK SUTURE IS SUPERIOR MARGIN/ADDITIONAL INFERIOR POSTERIOR MARGIN  DOUBLE SHORT BLACK SUTURE IS NEW INFERIOR MARGIN AND LONG SINGLE BLACK SUTURE IS NEW POSTERIOR MARGIN, LONG BLUE   SUTURE IS LATERAL MARGIN; ADDITIONAL SUPERIOR ANTERIOR MARGIN  TWO SHORT BLACK SUTURE IS NEW SUPERIOR MARGIN, LONG BLACK SUTURE IS NEW ANTERIOR MARGIN, BLUE SUTURE IS LATERAL; ADDITIONAL LATERAL TISSUE;   ADDITIONAL INFERIOR MARGIN  DOUBLE SHORT BLACK SUTURE IS NEW INFERIOR MARGIN AND LONG SINGLE BLACK SUTURE NEW POSTERIOR MARGIN, LONG BLUE SUTURE IS LATERAL MARGIN      Operation Performed:  LEFT BREAST LUMPECTOMY WITH NEEDLE LOCALIZATION     Source of Specimen   1: LEFT BREAST MASS / ADDITIONAL INFERIOR POSTERIOR MARGIN   2: ADDITIONAL SUPERIOR ANTERIOR MARGIN   3: ADDITIONAL LATERAL TISSUE   4: ADDITIONAL INFERIOR POSTERIOR MARGIN        PROCEDURE:     Excision of breast tissues   SPECIMEN LATERALITY & SITE OF EXCISION:     Left, multiple sites     SIZE (EXTENT) OF DCIS: Rare foci of atypical ductal hyperplasia noted   in parts 1, 2, 4; no diagnostic DCIS identified; part 4 contains   organizing fibrosis with foreign body reaction consistent with recent   biopsy site   HISTOLOGIC TYPE: N/A   NUCLEAR GRADE: N/A   NECROSIS: N/A     MARGINS -   - SITE(S) OF ALL POSITIVE MARGINS: N/A   - IF ALL NEGATIVE--   --SITE(S) AND DISTANCE TO CLOSEST: N/A     REGIONAL LYMPH NODES--       - NUMBER SENTINEL NODES:     0   - NUMBER SENTINEL + OTHER NODES: 0       - NUMBER WITH MACROMETASTASES: N/A       - NUMBER WITH MICROMETASTASES:     N/A   - NUMBER WITH ISOLATED TUMOR CELLS: N/A       - SIZE OF LARGEST METASTATIC DEPOSIT: N/A       - EXTRANODAL EXTENSION:      N/A     PATHOLOGIC STAGE CLASSIFICATION (pTNM):     pTis pN   (T and /or N descriptors used only if applicable)      March 4, 2021 - Completed postoperative radiation therapy to the left breast, 42.5 Gy in 15 fractions. She had expected fatigue and a mild skin reaction. She returns today for a brief toxicity evaluation.     Hx of Complete Decongestive Therapy:[]Yes - Date:        [x]No   Rate of onset:   [x] Slow   [] Rapid     [] Acute Progression: [] Improving   []  Worsening  [x] Consistent   Conservative Treatments Utilized in the Past:  [x] None  [] Compression Garments   [] Bandaging  [] Elevation   [] Exercise  []Self MLD  [] Other/comments:      Current Lymphedmea Treatments:   [x] None  [] Compression Garments   [] Bandaging  [] Elevation  [] Exercise   []Self MLD  [] Other/comments:          Aggravating factors:  [x] None   [] Activity  [] Stress  [] Sleep Position [] Travel [] Hot Temperatures [] Diet   []  Other/comments:    Relieving factors:   [x] None [] Elevation  [] Activity  [] Compression  [] Rest  [] Cold Temperatures [] Diet   []  Other/comments:    Comments:      Pain:  [x] YES    [] NO   Location: L axilla     Pain Rating: ( 0-10 scale) : 4/10  Comments: Pt states that the pain will come and go, occasionally she will get shooting pains coming from the axilla to the breast.     History of Cancer: [x]Yes []No   Location:  Breast              Date of Diagnosis: 10/2021  Currently undergoing treatments at evaluation: []Yes  [x]No    Surgery: Yes, Lumpectomy   Node Dissection: No   Chemotherapy: No   Radiation: Yes, Completed   Hormone Therapy: []Yes  [x]No      Cancer Related Fatigue: moderate   Is dominant extremity affected?  []Yes  [x]No  [] N/A     Absolute Lymphedema Contraindications - treatement   [x] DVT- Acute, No MLD to limb    ( HX of blood clot in L LE - was hospitalized and put on a blood thinner)    [x] Advanced Renal Disease - Need Physician Clearance ( HX of kidney stone)   Absolute Contraindications Regarding the Deep Abdomen: [x] NONE   Relative Lymphedema Contraindications    [x] Age 60+     Home/Work Environment  Patient lives with: Alone   In what type of home [x]  One story   [] Two story   [] Split level  [] Apartment   Number of stairs to enter 0   Bathroom has a []  Tub only  [] Tub/shower combo   [x] Walk in shower    []  Grab bars   Washing machine is on [x]  Main level   [] Second level   [] Basement Employer    Job Status []  Normal duty   [] Light duty   [] Off due to condition    [x]  Retired   [] Not employed   [] Disability  [] Other:  []  Return to work: Work activities/duties        ADL/IADL Previous level of function Current level of function Who currently assists the patient with task   Bathing  [x] Independent  [] Assist [x] Independent  [] Assist    Dress/grooming [x] Independent  [] Assist [x] Independent  [] Assist    Transfer/mobility [x] Independent  [] Assist [x] Independent  [] Assist    Feeding [x] Independent  [] Assist [x] Independent  [] Assist    Toileting [x] Independent  [] Assist [x] Independent  [] Assist    Driving [x] Independent  [] Assist [x] Independent  [] Assist    Housekeeping [x] Independent  [] Assist [x] Independent  [] Assist    Grocery shop/meal prep [x] Independent  [] Assist [x] Independent  [] Assist      Gait Prior level of function Current level of function    [x] Independent  [] Assist [x] Independent  [] Assist     OBJECTIVE    Presentation of Affected Areas  Location: left Upper Extremity    Description: None noted at this time at the arm, mild fullness noted on the axilla. Scars Yes  Good Mobility   Wounds None   Sensation Tingling   Additional Comments:      Circumferential Measurements  Measurements taken from nail base of D3 digit. Fingers are measured at the base.    Measurements (cm) Right Left   D1      D2       D3      D4      D5        Dorsum  11 19.0 19.5   Wrist    16 16.5 16.5   Lower Forearm  24 21.3 20.7   Upper Forearm   34 27.7 26.5   Olecranon  41 27.0 26.7   Lower Bicep  48 31.0 32.5   Upper Bicep   58 40.0 37.5   Initial Total 10/12/2021: 182.5 179.9     Measurements of Chest Wall  Measurements (cm)    Above nipple line    Nipple line    Below nipple line       ASSESSMENT:  Pt is provided with the following hand out and is educated on the below topics:     Pt is currently stage 0 lymphedema (pre-symptomatic) at the [facility] and would benefit from prophylactic education & treatment to include information regarding risk reduction practices to decrease risk of disease progression & infection, signs/symptoms of disease progression, preventative compression garments, and follow up for monitoring. It is noted pt is at Stage 1 Lymphedema of the Breast/Axilla. Pt is educated utilizing the following handout that includes information regarding signs/symptoms to be aware of to prevent disease progression & compression sleeve wearing schedule that will also aid in prevention of disease progression. Compression therapy is critically important while pt is asymptomatic (stage 0 lymphedema) to prevent the development of symptomatic disease (stages 1-3 lymphedema). Pt is also educated on an impaired lymphatic system 2/2 CA treatment vs. a healthy lymphatic system and how it relates to swelling and skin integrity. Patient would benefit from skilled occupational therapy services in order to provide the education necessary to prevent symptomatic lymphedema and to educate on long term management of condition. Early Signs/Symptoms of Swelling    Be on the lookout for the following symptoms at the at risk area, such as the arm, breast/chest, or armpit of the affected side.  Limb heaviness (most common initial symptom)   Pain, pulling, discomfort, and/or tightness in at risk area   Change in skin appearance/texture such as discoloration or tissue hardening   Leakage or \"weeping\" of clear fluid through the skin   Tenderness, tingling, burning sensation down the arm   Visible swelling of area    Preventative Sleeve Wearing Schedule  It is recommended that you obtain a compression arm sleeve for preventative purposes for your at-risk arm.  Once obtained, please wear DAILY for at least 1 month following mastectomy or for at least 1 month following your last day of radiation treatment, whichever comes last. If your radiation is pending and you are more than 1 month out from your mastectomy, please wear your sleeve as a preventative measure during the activities below. Once you begin radiation, wear daily during treatment and for 30 days to follow and then you may return to preventative wear. The list below contains activities that could overwhelm an already impaired lymphatic system, either from lymph node removal during surgery or damage caused by radiation, and cause onset of swelling. If a compression sleeve is worn during these activities, the risk is greatly reduced. These activities should be avoided if you are not wearing compression to the at risk area.  Airplane travel   Exercise   Heavy chores/work   Any task that requires repetitive arm movement    *Should you start experiencing any of the signs/symptoms of early onset swelling above, it is recommended that you return to daily sleeve wearing x30 days in order to prevent onset of visible swelling and follow up with your lymphedema therapist.*     Avoid applying moisturizers immediately before putting on your sleeve as this may break down the material.     Wash your sleeve per 's guidelines to maintain its integrity for best compression. If you are wearing your sleeve daily, please put your sleeve on first thing in the morning, or after your shower if shower in the mornings, and remove before bedtime. Compression sleeves SHOULD NOT be worn to bed due to risk of tourniquet effect. Response to treatment: Pt verbalizes and is agreeable to the instructions/ POC established at today's evaluation.         PLAN FOR NEXT VISIT: Initiate CDT, educate risk reduction techniques     Lymphedema Stage per ISL Guidelines    [] 0: Subclinical with no evidence on physical exam  [x] 1:  Early onset, swelling/heaviness, pitting edema, subsides with limb elevation  [] 2:  More advanced, fibrosis resulting in non-pitting edema, does not respond to elevation, thickening of the tissues  [] 3: Elephantiasis, pitting (14942):                             $37.46/ $26.79     Therapeutic Exercise (79414)                              $29.21/$ 22.84     Self care/home mgmt (35270)                              $32.39 / $24.43 15 1   Vasopneumatic Device (03241)                           $9.21     Total Treatment Time    60 2     Time In:  3:00   Time Out: 4:00      Electronically signed by Alejandra Anaya OT on 10/12/2021 at 9:32 AM      Physician Signature: _________________________        Date: _______________  By signing above or cosigning this note, I have reviewed this plan of care and certify a need to continue medically necessary rehabilitation services.       *PLEASE SIGN ABOVE AND FAX BACK .[phone] WITH ALL PAGES FOR CONSENT TO CONTINUE LYMPHEDEMA TREATMENT*

## 2021-10-14 ENCOUNTER — HOSPITAL ENCOUNTER (OUTPATIENT)
Age: 66
Setting detail: SPECIMEN
Discharge: HOME | End: 2021-10-14
Payer: COMMERCIAL

## 2021-10-14 DIAGNOSIS — E11.9 TYPE 2 DIABETES MELLITUS WITHOUT COMPLICATION, WITH LONG-TERM CURRENT USE OF INSULIN (HCC): ICD-10-CM

## 2021-10-14 DIAGNOSIS — Z79.4 TYPE 2 DIABETES MELLITUS WITHOUT COMPLICATION, WITH LONG-TERM CURRENT USE OF INSULIN (HCC): ICD-10-CM

## 2021-10-14 LAB
ALBUMIN SERPL-MCNC: 4.1 G/DL (ref 3.5–5.2)
ALBUMIN/GLOBULIN RATIO: 1.6 (ref 1–2.5)
ALP BLD-CCNC: 75 U/L (ref 35–104)
ALT SERPL-CCNC: 22 U/L (ref 5–33)
ANION GAP SERPL CALCULATED.3IONS-SCNC: 17 MMOL/L (ref 9–17)
AST SERPL-CCNC: 22 U/L
BILIRUB SERPL-MCNC: 0.49 MG/DL (ref 0.3–1.2)
BUN BLDV-MCNC: 8 MG/DL (ref 8–23)
BUN/CREAT BLD: ABNORMAL (ref 9–20)
CALCIUM SERPL-MCNC: 9 MG/DL (ref 8.6–10.4)
CHLORIDE BLD-SCNC: 104 MMOL/L (ref 98–107)
CHOLESTEROL, FASTING: 187 MG/DL
CHOLESTEROL/HDL RATIO: 3.6
CO2: 21 MMOL/L (ref 20–31)
CREAT SERPL-MCNC: 0.65 MG/DL (ref 0.5–0.9)
CREATININE URINE: 206.4 MG/DL (ref 28–217)
ESTIMATED AVERAGE GLUCOSE: 229 MG/DL
GFR AFRICAN AMERICAN: >60 ML/MIN
GFR NON-AFRICAN AMERICAN: >60 ML/MIN
GFR SERPL CREATININE-BSD FRML MDRD: ABNORMAL ML/MIN/{1.73_M2}
GFR SERPL CREATININE-BSD FRML MDRD: ABNORMAL ML/MIN/{1.73_M2}
GLUCOSE FASTING: 208 MG/DL (ref 70–99)
HBA1C MFR BLD: 9.6 % (ref 4–6)
HDLC SERPL-MCNC: 52 MG/DL
LDL CHOLESTEROL: 108 MG/DL (ref 0–130)
MICROALBUMIN/CREAT 24H UR: 30 MG/L
MICROALBUMIN/CREAT UR-RTO: 15 MCG/MG CREAT
POTASSIUM SERPL-SCNC: 4.3 MMOL/L (ref 3.7–5.3)
SODIUM BLD-SCNC: 142 MMOL/L (ref 135–144)
TOTAL PROTEIN: 6.7 G/DL (ref 6.4–8.3)
TRIGLYCERIDE, FASTING: 137 MG/DL
VLDLC SERPL CALC-MCNC: NORMAL MG/DL (ref 1–30)

## 2021-10-15 NOTE — RESULT ENCOUNTER NOTE
The number has gone up slightly, the treatment is to monitor every 6-12 months, have the patient get blod sugars under better control and consider adding another medication. We did try the patient on losartan in the past but she didn't tolerate it and her blood pressure went up so we switched back to previous medications per the patient's chart. Also we can take off the trulicity and increase tresiba if patient's concerned. There are other options such as farxiga, but the patient didn't tolerate that either. So we can either keep on trulicity, add back on losartan, recheck in 6 months or take off trulicity increase tresiba and recheck in 6 months. Her blood test to check her kidney was normal.  The a1c is still high but it is improving still.

## 2021-10-22 ENCOUNTER — INITIAL CONSULT (OUTPATIENT)
Dept: PAIN MANAGEMENT | Age: 66
End: 2021-10-22
Payer: COMMERCIAL

## 2021-10-22 VITALS
SYSTOLIC BLOOD PRESSURE: 137 MMHG | HEART RATE: 78 BPM | HEIGHT: 66 IN | WEIGHT: 261 LBS | DIASTOLIC BLOOD PRESSURE: 82 MMHG | BODY MASS INDEX: 41.95 KG/M2

## 2021-10-22 DIAGNOSIS — M54.16 LUMBAR RADICULOPATHY: ICD-10-CM

## 2021-10-22 DIAGNOSIS — M47.817 LUMBOSACRAL SPONDYLOSIS WITHOUT MYELOPATHY: Primary | ICD-10-CM

## 2021-10-22 PROCEDURE — 99204 OFFICE O/P NEW MOD 45 MIN: CPT | Performed by: PAIN MEDICINE

## 2021-10-22 ASSESSMENT — ENCOUNTER SYMPTOMS
BACK PAIN: 1
BOWEL INCONTINENCE: 1
RESPIRATORY NEGATIVE: 1
ABDOMINAL PAIN: 0
EYES NEGATIVE: 1

## 2021-10-22 NOTE — PROGRESS NOTES
HPI:     Back Pain  The current episode started more than 1 month ago. The problem occurs constantly. The problem is unchanged. The pain is present in the lumbar spine. The quality of the pain is described as aching and stabbing. The pain does not radiate. The pain is at a severity of 9/10. The pain is severe. The pain is the same all the time. The symptoms are aggravated by standing, sitting and position (walking). Stiffness is present in the morning. Associated symptoms include bowel incontinence, numbness and tingling. Pertinent negatives include no abdominal pain, chest pain, fever, headaches or leg pain. Risk factors include obesity. She has tried heat, ice, analgesics and NSAIDs (physical and aquatic therapy) for the symptoms. The treatment provided mild (aquatic therapy) relief. Chronic low back pain. X-ray lumbar spine with multilevel degenerative changes. She has done physical therapy and aqua therapy. Continues to have lingering pain. Does have history of breast cancer status post lumpectomy and radiation. Patient denies any new neurological symptoms. Nobowel or bladder incontinence, no weakness, and no falling. Review of OARRS does not show any aberrant prescription behavior.      Past Medical History:   Diagnosis Date    Breast cancer ([address]     left breast    Diabetes mellitus ([address]     Emphysema (subcutaneous) (surgical) resulting from a procedure     History of therapeutic radiation     Hx of blood clots     left leg    Hypertension     Kidney calculi     passed on her own    Neuropathy     DIONI on CPAP     uses nightly    Prolonged emergence from general anesthesia     Sleep apnea        Past Surgical History:   Procedure Laterality Date    BREAST BIOPSY Left 12/15/2020    LEFT BREAST LUMPECTOMY WITH   NEEDLE LOCALIZATION (11:30) performed by Delon Hall DO at  Hospital       removed polyps    COLONOSCOPY  10/26/2020    with Biopsy  COLONOSCOPY N/A 10/26/2020    COLONOSCOPY POLYPECTOMY  BIOPSY performed by Maegan Baca MD at [address] [place] [address], COLON, DIAGNOSTIC      KNEE ARTHROSCOPY Left 1995    HARI STEROTACTIC LOC BREAST BIOPSY LEFT  11/10/2020    HARI STEROTACTIC LOC BREAST BIOPSY LEFT 11/10/2020 [facility]    OTHER SURGICAL HISTORY      DNC for post menapausal bleeding    TONSILLECTOMY      US THYROID BIOPSY         Allergies   Allergen Reactions    Augmentin [Amoxicillin-Pot Clavulanate] Nausea And Vomiting    Bactrim [Sulfamethoxazole-Trimethoprim] Nausea And Vomiting    Vancomycin Rash    Dolobid [Diflunisal] Nausea And Vomiting         Current Outpatient Medications:     pantoprazole (PROTONIX) 40 MG tablet, TAKE 1 TABLET BY MOUTH EVERY DAY, Disp: 90 tablet, Rfl: 1    TRULICITY 6.24 SORIANO/7.3SU SOPN, INJECT 0.75 MG INTO THE SKIN ONCE A WEEK, Disp: 3 pen, Rfl: 2    amLODIPine (NORVASC) 5 MG tablet, TAKE 1 TABLET BY MOUTH EVERY DAY, Disp: 90 tablet, Rfl: 1    atenolol (TENORMIN) 50 MG tablet, Take 50 mg by mouth daily, Disp: , Rfl:     metFORMIN (GLUCOPHAGE) 500 MG tablet, TAKE 1 TABLET BY MOUTH TWICE A DAY WITH MEALS, Disp: 180 tablet, Rfl: 1    Insulin Degludec (TRESIBA FLEXTOUCH) 200 UNIT/ML SOPN, INJECT 16 UNITS INTO THE SKIN DAILY, Disp: 3 pen, Rfl: 0    Multiple Vitamin (MULTI-VITAMIN DAILY PO), Take by mouth daily, Disp: , Rfl:     aspirin 81 MG tablet, Take 81 mg by mouth daily, Disp: , Rfl:     Krill Oil 350 MG CAPS, Take 500 mg by mouth , Disp: , Rfl:     Insulin Pen Needle 32G X 4 MM MISC, 1 each by Does not apply route daily, Disp: 100 each, Rfl: 3    Family History   Problem Relation Age of Onset    Breast Cancer Mother     Heart Defect Mother         Murmur     Alzheimer's Disease Mother     Diabetes type 2  Mother     Heart Disease Father     Other Father         AA       Social History     Socioeconomic History    Marital status: Single     Spouse name: Not on file    Number of children: Not on file    Years of education: Not on file    Highest education level: Not on file   Occupational History    Not on file   Tobacco Use    Smoking status: Former Smoker     Packs/day: 2.00     Years: 44.00     Pack years: 88.00     Quit date: 2019     Years since quittin.3    Smokeless tobacco: Never Used   Vaping Use    Vaping Use: Former    Start date: 1/10/2019    Quit date: 6/10/2019   Substance and Sexual Activity    Alcohol use: Not Currently    Drug use: Never    Sexual activity: Not on file   Other Topics Concern    Not on file   Social History Narrative    Not on file     Social Determinants of Health     Financial Resource Strain: Low Risk     Difficulty of Paying Living Expenses: Not hard at all   Food Insecurity: No Food Insecurity    Worried About [address] in the Last Year: Never true    [facility] [employer] in the Last Year: Never true   Transportation Needs:     Lack of Transportation (Medical):  Lack of Transportation (Non-Medical):    Physical Activity:     Days of Exercise per Week:     Minutes of Exercise per Session:    Stress:     Feeling of Stress :    Social Connections:     Frequency of Communication with Friends and Family:     Frequency of Social Gatherings with Friends and Family:     Attends Orthodoxy Services:     Active Member of Clubs or Organizations:     Attends Club or Organization Meetings:     Marital Status:    Intimate Partner Violence:     Fear of Current or Ex-Partner:     Emotionally Abused:     Physically Abused:     Sexually Abused:        Review of Systems:  Review of Systems   Constitutional: Negative. Negative for fever. HENT: Positive for hearing loss. Left ear hearing loss   Eyes: Negative. Cardiovascular: Negative for chest pain. Respiratory: Negative. Endocrine: Negative. Hematologic/Lymphatic: Negative. Skin: Negative.     Musculoskeletal: Positive for back pain, myalgias and stiffness. Gastrointestinal: Positive for bowel incontinence. Negative for abdominal pain. Genitourinary: Negative. Neurological: Positive for numbness and tingling. Negative for headaches. Psychiatric/Behavioral: Negative. Allergic/Immunologic: Positive for environmental allergies. Physical Exam:  /82   Pulse 78   Ht 5' 6\" (1.676 m)   Wt 261 lb (118.4 kg)   BMI 42.13 kg/m²     Physical Exam  Constitutional:       Appearance: Normal appearance. Pulmonary:      Effort: Pulmonary effort is normal.   Neurological:      Mental Status: She is alert. Psychiatric:         Attention and Perception: Attention and perception normal.         Mood and Affect: Mood and affect normal.         Record/Diagnostics Review:    As above, I did indepenently review the imaging    Orders:  Orders Placed This Encounter   Procedures    MRI LUMBAR SPINE WO CONTRAST       Assessment:  1. Lumbosacral spondylosis without myelopathy    2. Lumbar radiculopathy        Treatment Plan:  DISCUSSION: Treatment options discussed with patient and all questions answered to patient's satisfaction. OARRS Review: Reviewed and acceptable for medications prescribed. TREATMENT OPTIONS:     Discussed different treatment options including continued conservative care such as physical therapy, chiropractic care, acupuncture. Discussed different interventional options such as epidural steroids or medial branch blocks. Also discussed neuromodulation in the form of spinal cord stimulation. Also discussed surgical evaluation. Has failed conservative measures, including physical therapy and the pain is worsening, I do believe an MRI of the Lumbar spine is indicated to further evaluate pathology and guide treatment plan. Rasheed Daniel M.D.         I have reviewed the chief complaint and history of present illness (including ROS and PFSH) and vital documentation by my staff and I agree with their documentation and have added where applicable.

## 2021-10-23 DIAGNOSIS — E11.65 TYPE 2 DIABETES MELLITUS WITH HYPERGLYCEMIA (HCC): ICD-10-CM

## 2021-10-25 RX ORDER — INSULIN DEGLUDEC 200 U/ML
INJECTION, SOLUTION SUBCUTANEOUS
Qty: 3 PEN | Refills: 1 | Status: SHIPPED | OUTPATIENT
Start: 2021-10-25 | End: 2022-07-15 | Stop reason: SDUPTHER

## 2021-11-02 ENCOUNTER — HOSPITAL ENCOUNTER (OUTPATIENT)
Dept: OCCUPATIONAL THERAPY | Age: 66
Setting detail: THERAPIES SERIES
Discharge: HOME | End: 2021-11-02
Payer: COMMERCIAL

## 2021-11-02 PROCEDURE — 97535 SELF CARE MNGMENT TRAINING: CPT

## 2021-11-02 NOTE — FLOWSHEET NOTE
TREATMENT LOCATION:   [x] [address]   On license of [facility]: [phone]  F: [phone] [x] [address]: [phone]  F: [phone]        Lymphedema Services - Treatment Note of the Upper Extremity    Date:  2021  Patient: Arpit Cole  : 1955             MRN: [id number]  Referring Physician: Juvencio Deluna MD       Phone: [phone]  Fax: [phone]  Insurance: Ernestina Bee- [address] after Eval - $20.00 Copay, PreAuth after eval by calling [employer]  (Outside of services area). [phone]    Medical Diagnosis: Ductal carcinoma in situ (DCIS) of left breast  Rehab Codes: I89.0, D05.12               Onset Date: 21  Visit# / total visits: 2/3  POC UPDATE DUE AT VISIT: 5    Absolute Lymphedema Contraindications - treatement   [x]? DVT- Acute, No MLD to limb    ( HX of blood clot in L LE - was hospitalized and put on a blood thinner)               [x]? Advanced Renal Disease - Need Physician Clearance ( HX of kidney stone)   Absolute Contraindications Regarding the Deep Abdomen: [x]? NONE   Relative Lymphedema Contraindications               [x]? Age 60+     Subjective: \" No new changes today, I am having a lot of discomfort but that is all. \"     Pain: [x] YES    [] NO     Location: Axilla/ Breast      Pain Rating: ( 0-10 scale) : 5/10  Comments:     Plan for next session: Follow up on compression arm sleeve status, review self MLD       Objective:   [] Measurement [] Bandaging [] MLD [] Skin care   [x] Education [] Self-bandaging [x] Self-MLD [] Wound Care   [] Exercise [] Caregiver training [] Kinesiotaping [] Other:    [] Nutrition [x] Garment fitting/training [] Vasopneumatic Pump           Circumferential Measurements  Measurements taken from nail base of D3 digit. Fingers are measured at the base.    Measurements (cm) Right Left   D1        D2         D3        D4        D5          Dorsum  11 19.0 19.5 Wrist    16 16.5 16.5   Lower Forearm  24 21.3 20.7   Upper Forearm   34 27.7 26.5   Olecranon  41 27.0 26.7   Lower Bicep  48 31.0 32.5   Upper Bicep   58 40.0 37.5   Initial Total 10/12/2021: 182.5 179.9       Assessment:  [x] Progressing toward goals. Pt arrives to today's treatment stating she has no new changes. She states that     Pt is also educated on NLN/ risk reduction techniques of which the pt verbalizes good understanding. Examples provided were the following:   · Keep the affected extremity clean/dry and no picking of loose skin. · Apply moisturizer daily to prevent chapping of skin. · Use care with razors to avoid nicks and skin irritation. · If scratches/punctures to skin occur, wash and apply a neosporin like product and observe for signs of infection. · If rash, itching, redness, pain, increased skin temperature, increased swelling, fever or flu like symptoms occur, contact PCP immediately and doff bandages that are in place. · Avoid exposure to extreme cold which can be associated with rebound swelling or chapping of skin. · Avoid prolonged exposure to heat, particular hot tubs and saunas. · Proper sitting positions  · Protect exposed skin with sunscreen and insect repellant. Self-MLD education initiated utilizing handouts below along with visual, verbal, and tactile cueing for proper sequence and hands on technique. Pt demo fair  teachback following instruction. Self-MLD Education     What is MLD?: MLD (or Manual Lymph Drainage) engages healthy lymph nodes & vessels in the body to create a suctioning effect to draw fluid away from full/affected areas. Additionally, we are teaching your lymphatic system to reroute fluid to healthy lymph vessels, which then drain into the venous system. It is recommended that you complete your self-MLD 2x per day. You are welcome to complete more if you'd like. Skin on skin contact is best to achieve best stretch.    It may be easiest for you to complete self-MLD while you are in bed or reclined. Therefore, you may want to complete in the AM after you wake up and in the PM before you go to bed. Tips:   Stationary Circles: These are \"C\" shaped or \"L\" shaped stretches and they should be completed SLOWLY. Please do not rush or MLD is not effective. Light pressure in working phase, hand should relax completely or release in resting phase before your next stroke/stretch (let your skin move your hand back to the original position at the beginning of the stretch). Pressure applied = the amount of pressure you should apply when stroking a s head. Circles should be large enough to stretch the skin, but NOT slide over the skin. No rubbing or petting the skin. Self-MLD Pre-Treatment for Upper Extremity Lymphedema     1. Stationary circles to clavicular lymph nodes x10 - located just above your collar bone      2. Deep breathing x5 - belly expands as you breathe in and deflates as you breathe out. Breathe in through the nose, out through the mouth. 3. Stationary circles over both inguinal lymph nodes x10 - located at the groin in the crease where your hip bends. 4. Stationary circles over  left axillary-inguinal pathway - connects your damaged axillary (armpit) lymph nodes to your inguinal (groin) lymph nodes. We are trying to send fluid AWAY from these damaged nodes down to the groin. Start at the bottom rib doing stationary circles until you reach your groin. Return to just above bottom rib and work your way back down to groin. Keep returning higher working your way back down to groin each time until you have reached your armpit. Each time you start over you should be one hand placement closer to armpit. 5. Stationary circles over right axillary (armpit) lymph nodes x10 - your NON-affected side. 6. Stationary circles from affected armpit to non-affected armpit left to right.  We are trying to send fluid AWAY from these damaged nodes. **7. Addressing areas of swelling over breast/in armpit: Think of the pathway(s) above as a major highway(s) to the lymph nodes at your groin/healthy armpit. Now, find where you are swollen over the chest and move fluid (using stationary circles) to the major highway(s) and down to the groin/over to the armpit. [if not contraindicated - Another target would be the lymph nodes above the collar bone. You'll create a pathway from the swelling to those nodes, again using stationary circles to move the fluid.]    Connect areas of swelling to \"highways\" (pathway from armpit to groin OR from L armpit to R armpit) utilizing stationary circles. [] No change. [] Other:  [x] Patient would continue to benefit from skilled occupational therapy services in order to prevent edema accumulation. STG - To be addressed within 2 visits     Pt will demonstrate compliance of maintaining lymphedema precautions to reduce the risks of infection and further exacerbations.        LTG To be adressed within 5 visits      Pt will demonstrate competence with SELF MLD (Manual lymphatic drainage) in order to reroute lymphatic pathways for decreased swelling.     Pt/Caregiver will demonstrate independence with donning/doffing and wearing schedule for compression garments/ devices to maintain decreased size upon discharge.     Patient's Goal: \" I would like to figure out what this pain is and get rid of it. \"         Pt. Education:  [x] Yes  [] No  [x] Reviewed Prior HEP/Ed  Method of Education: [x] Verbal  [x] Demo  [x] Written  Comprehension of Education:  [x] Verbalizes understanding. [x] Demonstrates understanding. [x] Needs review. [] No understanding  Knowledge of home program:  [x] Good [] Fair [] Poor [] With assist from family/caregiver      Plan:   [x] Continue current frequency toward long and short term goals.         Treatment Charges Minutes Units   Re-evaluation (43364):$   64.01/$49.52                                           Manual Therapy (12238):             $26.92 / $21.34     Therapeutic activities (72109):   $37.46/ $26.79     Therapeutic Exercise (25593):   $29.21/$ 22.84     Self care/home mgmt (89834):    $32.39 / $24.43 43 3   Vasopneumatic Device (91446):   $9.21     Total Treatment Time    43 3     Time In:  11:07  Time Out: 11:50      Electronically signed by Azul Gautam OT on 11/2/2021 at 11:11 AM

## 2021-11-03 ENCOUNTER — HOSPITAL ENCOUNTER (OUTPATIENT)
Dept: MRI IMAGING | Facility: CLINIC | Age: 66
Discharge: HOME | End: 2021-11-05
Payer: COMMERCIAL

## 2021-11-03 DIAGNOSIS — M47.817 LUMBOSACRAL SPONDYLOSIS WITHOUT MYELOPATHY: ICD-10-CM

## 2021-11-03 DIAGNOSIS — M54.16 LUMBAR RADICULOPATHY: ICD-10-CM

## 2021-11-03 PROCEDURE — 72148 MRI LUMBAR SPINE W/O DYE: CPT

## 2021-11-09 ENCOUNTER — HOSPITAL ENCOUNTER (OUTPATIENT)
Dept: OCCUPATIONAL THERAPY | Age: 66
Setting detail: THERAPIES SERIES
Discharge: HOME | End: 2021-11-09
Payer: COMMERCIAL

## 2021-11-09 PROCEDURE — 97535 SELF CARE MNGMENT TRAINING: CPT

## 2021-11-09 NOTE — FLOWSHEET NOTE
TREATMENT LOCATION:   [] [address]   [facility]: [phone]  F: [phone] [x] [address]: [phone]  F: [phone]        Lymphedema Services - Treatment Note of the Upper Extremity    Date:  2021  Patient: Saintclair Bridegroom  : 1955             MRN: [id number]  Referring Physician: Sana Johnson MD       Phone: [phone]  Fax: [phone]  Insurance: Claudia Hendrix- 1 Noni [address] after Eval - $20.00 Copay, PreAuth after eval by calling [employer]  (Outside of services area). [phone]  Medical Diagnosis: Ductal carcinoma in situ (DCIS) of left breast  Rehab Codes: I89.0, D05.12               Onset Date: 21  Visit# / total visits: 3/3  POC UPDATE DUE AT VISIT: 5     Absolute Lymphedema Contraindications - treatement   [x]? DVT- Acute, No MLD to limb    ( HX of blood clot in L LE - was hospitalized and put on a blood thinner)               [x]? Advanced Renal Disease - Need Physician Clearance ( HX of kidney stone)   Absolute Contraindications Regarding the Deep Abdomen: [x]? NONE   Relative Lymphedema Contraindications               [x]? Age 60+     Subjective: \" No new changes today, I am having a lot of discomfort but that is all. \"     Pain: [x] YES    [] NO     Location: Axilla/ Breast      Pain Rating: ( 0-10 scale) : 5/10  Comments:     Plan for next session: Follow up on compression arm sleeve order, review self MLD, need for swell spot       Objective:   [] Measurement [] Bandaging [] MLD [] Skin care   [x] Education [] Self-bandaging [x] Self-MLD [] Wound Care   [] Exercise [] Caregiver training [] Kinesiotaping [] Other:    [] Nutrition [x] Garment fitting/training [] Vasopneumatic Pump           Circumferential Measurements  Measurements taken from nail base of D3 digit. Fingers are measured at the base.    Measurements (cm) Right Left   D1        D2         D3        D4        D5          Dorsum 11 19.0 19.5   Wrist    16 16.5 16.5   Lower Forearm  24 21.3 20.7   Upper Forearm   34 27.7 26.5   Olecranon  41 27.0 26.7   Lower Bicep  48 31.0 32.5   Upper Bicep   58 40.0 37.5   Initial Total 10/12/2021: 182.5 179.9       Assessment:  [x] Progressing toward goals. Pt arrives to today's treatment stating she has no new changes, expect the pain in her axilla has returned. She states that overall she still has no sensations in her arm and she is not certain that the exercises she is doing is helping. Pt questions whether or not this is really lymphedema. OT educates pt that it also may be infection. Pt is strongly encouraged to monitor the area and make her physcian aware if it does not improve. Pt is provided with a chip pad today and educated on how to properly wear the garment. Pt notes good understanding. Pt is fruther educated on various compression garments that would be of benefit to her to purchase in the future for better containment of her edema. OT educated on the difference between flat knit and circular knit garments. Pt is also educated how it would be of benefit for her to get a profalactic arm sleeve. Pt is educated that she has 70% coverage through comfort care and is agreeable to have an order placed as follows:     Lymphadivas- SIZE: Medium LENGTH: Long with a silicone boarder   COLOR: Camouflage Pink    OT also spends time reviewing the exercises below with the patient. Pt is only completing bold exercises as she initially requested to have them simplified. Self-MLD Pre-Treatment for Upper Extremity Lymphedema     1. Stationary circles to clavicular lymph nodes x10 - located just above your collar bone      2. Deep breathing x5 - belly expands as you breathe in and deflates as you breathe out. Breathe in through the nose, out through the mouth. 3. Stationary circles over both inguinal lymph nodes x10 - located at the groin in the crease where your hip bends.      4. Stationary circles over  left axillary-inguinal pathway - connects your damaged axillary (armpit) lymph nodes to your inguinal (groin) lymph nodes. We are trying to send fluid AWAY from these damaged nodes down to the groin. Start at the bottom rib doing stationary circles until you reach your groin. Return to just above bottom rib and work your way back down to groin. Keep returning higher working your way back down to groin each time until you have reached your armpit. Each time you start over you should be one hand placement closer to armpit. 5. Stationary circles over right axillary (armpit) lymph nodes x10 - your NON-affected side. 6. Stationary circles from affected armpit to non-affected armpit left to right. We are trying to send fluid AWAY from these damaged nodes. EDUCATED 11/9/2021 on these correct techniqes below for the breast itself:     **7. Addressing areas of swelling over breast/in armpit: Think of the pathway(s) above as a major highway(s) to the lymph nodes at your groin/healthy armpit. Now, find where you are swollen over the chest and move fluid (using stationary circles) to the major highway(s) and down to the groin/over to the armpit. [if not contraindicated - Another target would be the lymph nodes above the collar bone. You'll create a pathway from the swelling to those nodes, again using stationary circles to move the fluid.]    Connect areas of swelling to \"highways\" (pathway from armpit to groin OR from L armpit to R armpit) utilizing stationary circles. [] No change. [] Other:  [x] Patient would continue to benefit from skilled occupational therapy services in order to prevent edema accumulation.        STG - To be addressed within 2 visits     Pt will demonstrate compliance of maintaining lymphedema precautions to reduce the risks of infection and further exacerbations.        LTG To be adressed within 5 visits      Pt will demonstrate competence with SELF MLD (Manual lymphatic drainage) in order to reroute lymphatic pathways for decreased swelling.     Pt/Caregiver will demonstrate independence with donning/doffing and wearing schedule for compression garments/ devices to maintain decreased size upon discharge.     Patient's Goal: \" I would like to figure out what this pain is and get rid of it. \"         Pt. Education:  [x] Yes  [] No  [x] Reviewed Prior HEP/Ed  Method of Education: [x] Verbal  [x] Demo  [x] Written  Comprehension of Education:  [x] Verbalizes understanding. [x] Demonstrates understanding. [x] Needs review. [] No understanding  Knowledge of home program:  [x] Good [] Fair [] Poor [] With assist from family/caregiver    Plan:   [x] Continue current frequency toward long and short term goals.       Treatment Charges Minutes   Units   Re-evaluation (39685):$   64.01/$49.52                                           Manual Therapy (37970):             $26.92 / $21.34     Therapeutic activities (84530):   $37.46/ $26.79     Therapeutic Exercise (44017):   $29.21/$ 22.84     Self care/home mgmt (28296):    $32.39 / $24.43 40 3   Vasopneumatic Device (23204):   $9.21     Total Treatment Time    40 3     Time In: 2:35  Time Out: 3:15      Electronically signed by Paulette Harper OT on 11/9/2021 at 2:38 PM

## 2021-11-15 ENCOUNTER — TELEPHONE (OUTPATIENT)
Dept: PAIN MANAGEMENT | Age: 66
End: 2021-11-15

## 2021-11-15 ENCOUNTER — TELEMEDICINE (OUTPATIENT)
Dept: PAIN MANAGEMENT | Age: 66
End: 2021-11-15
Payer: COMMERCIAL

## 2021-11-15 DIAGNOSIS — M51.36 DEGENERATION OF LUMBAR INTERVERTEBRAL DISC: ICD-10-CM

## 2021-11-15 DIAGNOSIS — M47.817 LUMBOSACRAL SPONDYLOSIS WITHOUT MYELOPATHY: Primary | ICD-10-CM

## 2021-11-15 PROCEDURE — 99213 OFFICE O/P EST LOW 20 MIN: CPT | Performed by: PAIN MEDICINE

## 2021-11-15 ASSESSMENT — ENCOUNTER SYMPTOMS: BACK PAIN: 1

## 2021-11-15 NOTE — TELEPHONE ENCOUNTER
Called pt to set up next appt. Pt stated that a referral was placed for PT, but pt stated that she has already done that and it did not help and does not want to continue, is there anything else she can do. Please advise. **Patient declined to schedule at this time to see if there is anything else she can do, does not want RFA at this time.

## 2021-11-15 NOTE — PROGRESS NOTES
HPI:     Back Pain  This is a chronic problem. The current episode started more than 1 year ago. The problem occurs constantly. The problem has been gradually worsening since onset. The pain is present in the lumbar spine. The quality of the pain is described as aching. The pain is moderate. The pain is the same all the time. The symptoms are aggravated by position. Stiffness is present all day. MRI with degenerative changes. PT and aqua therapy w/o benefit. History of lumpectomy with radiation, doing well in that regard. Patient denies any new neurological symptoms. Nobowel or bladder incontinence, no weakness, and no falling. Review of OARRS does not show any aberrant prescription behavior.      Past Medical History:   Diagnosis Date    Breast cancer ([address]     left breast    Diabetes mellitus ([address]     Emphysema (subcutaneous) (surgical) resulting from a procedure     History of therapeutic radiation     Hx of blood clots     left leg    Hypertension     Kidney calculi     passed on her own    Neuropathy     DIONI on CPAP     uses nightly    Prolonged emergence from general anesthesia     Sleep apnea        Past Surgical History:   Procedure Laterality Date    BREAST BIOPSY Left 12/15/2020    LEFT BREAST LUMPECTOMY WITH   NEEDLE LOCALIZATION (11:30) [facility]       removed polyps    COLONOSCOPY  10/26/2020    with Biopsy    COLONOSCOPY N/A 10/26/2020    COLONOSCOPY POLYPECTOMY  BIOPSY performed by Cain Park MD at [address] [place] [address] [facility], COLON, DIAGNOSTIC      KNEE ARTHROSCOPY Left 1995    HARI STEROTACTIC LOC BREAST BIOPSY LEFT  11/10/2020    HARI STEROTACTIC LOC BREAST BIOPSY LEFT 11/10/2020 [facility]    OTHER SURGICAL HISTORY      DNC for post menapausal bleeding    TONSILLECTOMY      US THYROID BIOPSY         Allergies   Allergen Reactions    Augmentin [Amoxicillin-Pot Clavulanate] Nausea And Vomiting  Bactrim [Sulfamethoxazole-Trimethoprim] Nausea And Vomiting    Vancomycin Rash    Dolobid [Diflunisal] Nausea And Vomiting         Current Outpatient Medications:     Insulin Degludec (TRESIBA FLEXTOUCH) 200 UNIT/ML SOPN, INJECT 20 UNITS INTO THE SKIN DAILY, Disp: 3 pen, Rfl: 1    pantoprazole (PROTONIX) 40 MG tablet, TAKE 1 TABLET BY MOUTH EVERY DAY, Disp: 90 tablet, Rfl: 1    TRULICITY 2.86 NT/6.1RX SOPN, INJECT 0.75 MG INTO THE SKIN ONCE A WEEK, Disp: 3 pen, Rfl: 2    amLODIPine (NORVASC) 5 MG tablet, TAKE 1 TABLET BY MOUTH EVERY DAY, Disp: 90 tablet, Rfl: 1    atenolol (TENORMIN) 50 MG tablet, Take 50 mg by mouth daily, Disp: , Rfl:     metFORMIN (GLUCOPHAGE) 500 MG tablet, TAKE 1 TABLET BY MOUTH TWICE A DAY WITH MEALS, Disp: 180 tablet, Rfl: 1    Insulin Pen Needle 32G X 4 MM MISC, 1 each by Does not apply route daily, Disp: 100 each, Rfl: 3    Multiple Vitamin (MULTI-VITAMIN DAILY PO), Take by mouth daily, Disp: , Rfl:     aspirin 81 MG tablet, Take 81 mg by mouth daily, Disp: , Rfl:     Krill Oil 350 MG CAPS, Take 500 mg by mouth , Disp: , Rfl:     Family History   Problem Relation Age of Onset    Breast Cancer Mother     Heart Defect Mother         Murmur     Alzheimer's Disease Mother     Diabetes type 2  Mother     Heart Disease Father     Other Father         AA       Social History     Socioeconomic History    Marital status: Single     Spouse name: Not on file    Number of children: Not on file    Years of education: Not on file    Highest education level: Not on file   Occupational History    Not on file   Tobacco Use    Smoking status: Former Smoker     Packs/day: 2.00     Years: 44.00     Pack years: 88.00     Quit date: 2019     Years since quittin.4    Smokeless tobacco: Never Used   Vaping Use    Vaping Use: Former    Start date: 1/10/2019    Quit date: 6/10/2019   Substance and Sexual Activity    Alcohol use: Not Currently    Drug use: Never    Sexual activity: Not on file   Other Topics Concern    Not on file   Social History Narrative    Not on file     Social Determinants of Health     Financial Resource Strain: Low Risk     Difficulty of Paying Living Expenses: Not hard at all   Food Insecurity: No Food Insecurity    Worried About Running Out of Food in the Last Year: Never true    [address] in the Last Year: Never true   Transportation Needs:     Lack of Transportation (Medical): Not on file    Lack of Transportation (Non-Medical): Not on file   Physical Activity:     Days of Exercise per Week: Not on file    Minutes of Exercise per Session: Not on file   Stress:     Feeling of Stress : Not on file   Social Connections:     Frequency of Communication with Friends and Family: Not on file    Frequency of Social Gatherings with Friends and Family: Not on file    Attends [employer] Services: Not on file    Active Member of [address] [place] [address] Progression or Organizations: Not on file    Attends Club or Organization Meetings: Not on file    Marital Status: Not on file   Intimate Partner Violence:     Fear of Current or Ex-Partner: Not on file    Emotionally Abused: Not on file    Physically Abused: Not on file    Sexually Abused: Not on file   Housing Stability:     Unable to Pay for Housing in the Last Year: Not on file    Number of [place] in the Last Year: Not on file    Unstable Housing in the Last Year: Not on file       Review of Systems:  Review of Systems   Musculoskeletal: Positive for back pain. Physical Exam:      Physical Exam  Constitutional:       Appearance: Normal appearance. Pulmonary:      Effort: Pulmonary effort is normal.   Neurological:      Mental Status: She is alert.    Psychiatric:         Attention and Perception: Attention and perception normal.         Mood and Affect: Mood and affect normal.         Record/Diagnostics Review:    As above, I did review the imaging    Orders:  Orders Placed This Encounter   Procedures   

## 2021-11-16 RX ORDER — ATENOLOL 50 MG/1
50 TABLET ORAL DAILY
Qty: 90 TABLET | Refills: 1 | Status: SHIPPED | OUTPATIENT
Start: 2021-11-16 | End: 2022-05-13 | Stop reason: SDUPTHER

## 2021-11-18 ENCOUNTER — APPOINTMENT (OUTPATIENT)
Dept: PHYSICAL THERAPY | Facility: CLINIC | Age: 66
End: 2021-11-18
Payer: COMMERCIAL

## 2021-11-19 NOTE — TELEPHONE ENCOUNTER
Called pt to offer OV/VV pt stated she just had that and would like to get her records to get a second opinion.

## 2021-12-12 DIAGNOSIS — Z79.4 TYPE 2 DIABETES MELLITUS WITHOUT COMPLICATION, WITH LONG-TERM CURRENT USE OF INSULIN (HCC): ICD-10-CM

## 2021-12-12 DIAGNOSIS — E11.9 TYPE 2 DIABETES MELLITUS WITHOUT COMPLICATION, WITH LONG-TERM CURRENT USE OF INSULIN (HCC): ICD-10-CM

## 2021-12-13 RX ORDER — DULAGLUTIDE 0.75 MG/.5ML
0.75 INJECTION, SOLUTION SUBCUTANEOUS WEEKLY
Qty: 3 PEN | Refills: 2 | Status: SHIPPED | OUTPATIENT
Start: 2021-12-13 | End: 2022-03-10 | Stop reason: SDUPTHER

## 2022-01-03 NOTE — TELEPHONE ENCOUNTER
Ronna Lawson is calling to request a refill on the following medication(s):    Medication Request:  Requested Prescriptions     Pending Prescriptions Disp Refills    metFORMIN (GLUCOPHAGE) 500 MG tablet [Pharmacy Med Name: METFORMIN  MG TABLET] 180 tablet 1     Sig: TAKE 1 TABLET BY MOUTH TWICE A DAY WITH MEALS       Last Visit Date (If Applicable):  40/9/4030    Next Visit Date:    Visit date not found

## 2022-01-18 ENCOUNTER — TELEPHONE (OUTPATIENT)
Dept: FAMILY MEDICINE CLINIC | Age: 67
End: 2022-01-18

## 2022-01-18 DIAGNOSIS — G47.33 OSA (OBSTRUCTIVE SLEEP APNEA): Primary | ICD-10-CM

## 2022-01-18 NOTE — TELEPHONE ENCOUNTER
Emely Bran is calling about a CPAP order back in September. They do not have heated  tubes but would like to have a order for a  standard tube. Can you sign an new order and fax it over to the office? Order being faxed over.

## 2022-02-10 NOTE — TELEPHONE ENCOUNTER
Bucky Larose is calling to request a refill on the following medication(s):    Medication Request:  Requested Prescriptions     Pending Prescriptions Disp Refills    pantoprazole (PROTONIX) 40 MG tablet 90 tablet 1     Sig: TAKE 1 TABLET BY MOUTH EVERY DAY       Last Visit Date (If Applicable):  45/7/7679    Next Visit Date:    Visit date not found

## 2022-02-11 RX ORDER — PANTOPRAZOLE SODIUM 40 MG/1
TABLET, DELAYED RELEASE ORAL
Qty: 90 TABLET | Refills: 1 | Status: SHIPPED | OUTPATIENT
Start: 2022-02-11 | End: 2022-08-09

## 2022-03-10 DIAGNOSIS — Z79.4 TYPE 2 DIABETES MELLITUS WITHOUT COMPLICATION, WITH LONG-TERM CURRENT USE OF INSULIN (HCC): ICD-10-CM

## 2022-03-10 DIAGNOSIS — E11.9 TYPE 2 DIABETES MELLITUS WITHOUT COMPLICATION, WITH LONG-TERM CURRENT USE OF INSULIN (HCC): ICD-10-CM

## 2022-03-10 RX ORDER — DULAGLUTIDE 0.75 MG/.5ML
0.75 INJECTION, SOLUTION SUBCUTANEOUS WEEKLY
Qty: 3 PEN | Refills: 2 | Status: SHIPPED | OUTPATIENT
Start: 2022-03-10 | End: 2022-08-25 | Stop reason: ALTCHOICE

## 2022-03-10 NOTE — TELEPHONE ENCOUNTER
Jaleel Lopez is calling to request a refill on the following medication(s):    Medication Request:  Requested Prescriptions     Pending Prescriptions Disp Refills    Dulaglutide (TRULICITY) 3.26 GO/4.9XH SOPN 3 pen 2     Sig: Inject 0.75 mg into the skin once a week       Last Visit Date (If Applicable):  07/7/0456    Next Visit Date:    Visit date not found

## 2022-03-15 ENCOUNTER — OFFICE VISIT (OUTPATIENT)
Dept: FAMILY MEDICINE CLINIC | Age: 67
End: 2022-03-15
Payer: COMMERCIAL

## 2022-03-15 VITALS
HEIGHT: 66 IN | OXYGEN SATURATION: 98 % | DIASTOLIC BLOOD PRESSURE: 88 MMHG | BODY MASS INDEX: 40.18 KG/M2 | HEART RATE: 97 BPM | TEMPERATURE: 97.3 F | SYSTOLIC BLOOD PRESSURE: 137 MMHG | WEIGHT: 250 LBS

## 2022-03-15 DIAGNOSIS — I10 ESSENTIAL HYPERTENSION: ICD-10-CM

## 2022-03-15 DIAGNOSIS — Z87.891 PERSONAL HISTORY OF TOBACCO USE: ICD-10-CM

## 2022-03-15 DIAGNOSIS — Z23 NEED FOR PROPHYLACTIC VACCINATION AND INOCULATION AGAINST VARICELLA: ICD-10-CM

## 2022-03-15 DIAGNOSIS — F41.9 ANXIETY: ICD-10-CM

## 2022-03-15 DIAGNOSIS — Z79.4 TYPE 2 DIABETES MELLITUS WITHOUT COMPLICATION, WITH LONG-TERM CURRENT USE OF INSULIN (HCC): Primary | ICD-10-CM

## 2022-03-15 DIAGNOSIS — R07.89 LEFT-SIDED CHEST WALL PAIN: ICD-10-CM

## 2022-03-15 DIAGNOSIS — E11.9 TYPE 2 DIABETES MELLITUS WITHOUT COMPLICATION, WITH LONG-TERM CURRENT USE OF INSULIN (HCC): Primary | ICD-10-CM

## 2022-03-15 LAB — HBA1C MFR BLD: 9.1 %

## 2022-03-15 PROCEDURE — 99214 OFFICE O/P EST MOD 30 MIN: CPT | Performed by: STUDENT IN AN ORGANIZED HEALTH CARE EDUCATION/TRAINING PROGRAM

## 2022-03-15 PROCEDURE — 83036 HEMOGLOBIN GLYCOSYLATED A1C: CPT | Performed by: STUDENT IN AN ORGANIZED HEALTH CARE EDUCATION/TRAINING PROGRAM

## 2022-03-15 RX ORDER — TIZANIDINE 4 MG/1
4 TABLET ORAL NIGHTLY PRN
Qty: 10 TABLET | Refills: 1 | Status: SHIPPED | OUTPATIENT
Start: 2022-03-15 | End: 2022-05-11

## 2022-03-15 RX ORDER — EMPAGLIFLOZIN 10 MG/1
1 TABLET, FILM COATED ORAL DAILY
Qty: 30 TABLET | Refills: 5 | Status: SHIPPED | OUTPATIENT
Start: 2022-03-15 | End: 2022-08-25 | Stop reason: SDUPTHER

## 2022-03-15 RX ORDER — BUPROPION HYDROCHLORIDE 75 MG/1
75 TABLET ORAL DAILY
Qty: 30 TABLET | Refills: 2 | Status: SHIPPED | OUTPATIENT
Start: 2022-03-15 | End: 2022-04-06

## 2022-03-15 ASSESSMENT — ENCOUNTER SYMPTOMS
SHORTNESS OF BREATH: 0
COUGH: 0
VOMITING: 0
WHEEZING: 0
EYE DISCHARGE: 0
NAUSEA: 0
CHEST TIGHTNESS: 0
ABDOMINAL PAIN: 0
DIARRHEA: 0
SORE THROAT: 0
CONSTIPATION: 0

## 2022-03-15 NOTE — PROGRESS NOTES
[address] PRIMARY CARE  [address] [place] [address]  Dept: [phone]  Dept Fax: [phone]    Jemima Obando is a 61 y.o. female who is a Established patient, who presents today for her medical conditions/complaints as noted below:  Chief Complaint   Patient presents with    Establish Care    Breast Pain     left     Diabetes     type 2        HPI:     She is here today to follow-up on diabetes and hypertension. She says that she was previously tried on losartan but her blood pressures were very high. She says that after several tries her blood pressures finally responded to a combination of amlodipine and atenolol and she is doing well on it. She says that her sugars have also been running high because she has been more stressed lately which makes her eat more carbs. She says that she was having more diarrhea with higher dose of Trulicity and had to go back on the lower dose because of that. She also takes Metformin and is not sure if the diarrhea is secondary to Metformin as well. She says that since she diagnosed with breast cancer, she has been dealing with a lot of stress and anxiety. She is also complaining of pain in the left side of her chest wall below her axillary area for past year since she had a radiation. She says that the pain comes and goes and sometimes gets worse with movement. She can feel a tender spot in her left chest wall that hurts when she presses on it. She says that initially she was told it was radiation but her oncologist thought it was lymphedema and she was sent over to lymphedema clinic. She says that there she was told that this was probably something else and she needs to follow with PCP. She is due for her annual lung CT. Hemoglobin A1C (%)   Date Value   03/15/2022 9.1   10/14/2021 9.6 (H)   04/14/2021 10.9             ( goal A1Cis < 7)   Microalb/Crt.  Ratio (mcg/mg creat)   Date Value   10/14/2021 15 LDL Cholesterol (mg/dL)   Date Value   10/14/2021 108   2021 108   2020 140 (H)       (goal LDL is <100)   AST (U/L)   Date Value   10/14/2021 22     ALT (U/L)   Date Value   10/14/2021 22     BUN (mg/dL)   Date Value   10/14/2021 8     BP Readings from Last 3 Encounters:   03/15/22 137/88   10/22/21 137/82   21 (!) 143/89          (goal 120/80)    Past Medical History:   Diagnosis Date    Breast cancer ([address]     left breast    Diabetes mellitus ([facility]     Emphysema (subcutaneous) (surgical) resulting from a procedure     History of therapeutic radiation     Hx of blood clots     left leg    Hypertension     Kidney calculi     passed on her own    Neuropathy     DIONI on CPAP     uses nightly    Prolonged emergence from general anesthesia     Sleep apnea       Past Surgical History:   Procedure Laterality Date    BREAST BIOPSY Left 12/15/2020    LEFT BREAST LUMPECTOMY WITH   NEEDLE LOCALIZATION (11:30) performed by Denise Posadas DO at [address] [place] [address]       removed polyps    COLONOSCOPY  10/26/2020    with Biopsy    COLONOSCOPY N/A 10/26/2020    COLONOSCOPY POLYPECTOMY  BIOPSY performed by Angy Gasca MD at [address], COLON, DIAGNOSTIC      KNEE ARTHROSCOPY Left     HARI STEROTACTIC LOC BREAST BIOPSY LEFT  11/10/2020    HARI STEROTACTIC LOC BREAST BIOPSY LEFT 11/10/2020 [facility]    OTHER SURGICAL HISTORY      DNC for post menapausal bleeding    TONSILLECTOMY      US THYROID BIOPSY         Family History   Problem Relation Age of Onset    Breast Cancer Mother     Heart Defect Mother         Murmur     Alzheimer's Disease Mother     Diabetes type 2  Mother     Heart Disease Father     Other Father         AA       Social History     Tobacco Use    Smoking status: Former Smoker     Packs/day: 2.00     Years: 44.00     Pack years: 88.00     Quit date: 2019     Years since quittin.7    Smokeless tobacco: Never Used   Substance Use Topics    Alcohol use: Not Currently      Current Outpatient Medications   Medication Sig Dispense Refill    empagliflozin (JARDIANCE) 10 MG tablet Take 1 tablet by mouth daily 30 tablet 5    tiZANidine (ZANAFLEX) 4 MG tablet Take 1 tablet by mouth nightly as needed (muscle spasm) 10 tablet 1    buPROPion (WELLBUTRIN) 75 MG tablet Take 1 tablet by mouth daily 30 tablet 2    Dulaglutide (TRULICITY) 1.76 QJ/8.0CI SOPN Inject 0.75 mg into the skin once a week 3 pen 2    pantoprazole (PROTONIX) 40 MG tablet TAKE 1 TABLET BY MOUTH EVERY DAY 90 tablet 1    metFORMIN (GLUCOPHAGE) 500 MG tablet TAKE 1 TABLET BY MOUTH TWICE A DAY WITH MEALS 180 tablet 1    atenolol (TENORMIN) 50 MG tablet Take 1 tablet by mouth daily 90 tablet 1    Insulin Degludec (TRESIBA FLEXTOUCH) 200 UNIT/ML SOPN INJECT 20 UNITS INTO THE SKIN DAILY 3 pen 1    amLODIPine (NORVASC) 5 MG tablet TAKE 1 TABLET BY MOUTH EVERY DAY 90 tablet 1    Insulin Pen Needle 32G X 4 MM MISC 1 each by Does not apply route daily 100 each 3    Multiple Vitamin (MULTI-VITAMIN DAILY PO) Take by mouth daily      aspirin 81 MG tablet Take 81 mg by mouth daily      Krill Oil 350 MG CAPS Take 500 mg by mouth        No current facility-administered medications for this visit.      Allergies   Allergen Reactions    Augmentin [Amoxicillin-Pot Clavulanate] Nausea And Vomiting    Bactrim [Sulfamethoxazole-Trimethoprim] Nausea And Vomiting    Vancomycin Rash    Dolobid [Diflunisal] Nausea And Vomiting       Health Maintenance   Topic Date Due    Shingles Vaccine (1 of 2) Never done    COVID-19 Vaccine (3 - Moderna risk 4-dose series) 04/29/2021    Diabetic retinal exam  08/21/2021    Low dose CT lung screening  10/28/2021    Depression Screen  04/02/2022    A1C test (Diabetic or Prediabetic)  06/15/2022    Diabetic foot exam  07/07/2022    Pneumococcal 0-64 years Vaccine (2 of 4 - PPSV23) 08/17/2022    Diabetic microalbuminuria test 10/14/2022    Lipid screen  10/14/2022    Potassium monitoring  10/14/2022    Creatinine monitoring  10/14/2022    Breast cancer screen  12/06/2022    Colorectal Cancer Screen  10/26/2023    Cervical cancer screen  07/06/2025    DTaP/Tdap/Td vaccine (2 - Td or Tdap) 08/20/2030    Flu vaccine  Completed    Hepatitis C screen  Completed    HIV screen  Completed    Hepatitis A vaccine  Aged Out    Hib vaccine  Aged Out    Meningococcal (ACWY) vaccine  Aged Out       Subjective:     Review of Systems   Constitutional: Negative for appetite change, fatigue and fever. HENT: Negative for congestion, ear pain, hearing loss and sore throat. Eyes: Negative for discharge and visual disturbance. Respiratory: Negative for cough, chest tightness, shortness of breath and wheezing. Cardiovascular: Positive for chest pain. Negative for palpitations and leg swelling. Gastrointestinal: Negative for abdominal pain, constipation, diarrhea, nausea and vomiting. Genitourinary: Negative for flank pain, frequency, hematuria and urgency. Musculoskeletal: Negative for arthralgias, gait problem, joint swelling and myalgias. Skin: Negative. Neurological: Negative for dizziness, weakness, numbness and headaches. Psychiatric/Behavioral: Negative for dysphoric mood. The patient is nervous/anxious. Objective:     Physical Exam  Vitals reviewed. Constitutional:       Appearance: Normal appearance. She is normal weight. HENT:      Head: Normocephalic and atraumatic. Nose: Nose normal.      Mouth/Throat:      Mouth: Mucous membranes are moist.      Pharynx: Oropharynx is clear. Eyes:      Extraocular Movements: Extraocular movements intact. Conjunctiva/sclera: Conjunctivae normal.      Pupils: Pupils are equal, round, and reactive to light. Cardiovascular:      Rate and Rhythm: Normal rate and regular rhythm. Heart sounds: Normal heart sounds. No murmur heard. No gallop.     Pulmonary: Effort: Pulmonary effort is normal. No respiratory distress. Breath sounds: Normal breath sounds. No stridor. No wheezing. Chest:      Chest wall: Tenderness present. Abdominal:      General: Bowel sounds are normal. There is no distension. Palpations: Abdomen is soft. Tenderness: There is no abdominal tenderness. There is no guarding or rebound. Musculoskeletal:         General: No swelling or tenderness. Normal range of motion. Cervical back: Normal range of motion and neck supple. Skin:     General: Skin is warm and dry. Coloration: Skin is not jaundiced. Findings: No rash. Neurological:      General: No focal deficit present. Mental Status: She is alert and oriented to person, place, and time. Psychiatric:         Mood and Affect: Mood normal.         Behavior: Behavior normal.         Thought Content: Thought content normal.         Judgment: Judgment normal.       /88   Pulse 97   Temp 97.3 °F (36.3 °C)   Ht 5' 6\" (1.676 m)   Wt 250 lb (113.4 kg)   SpO2 98%   BMI 40.35 kg/m²     Assessment/Plan:   1. Type 2 diabetes mellitus without complication, with long-term current use of insulin ([facility])  -     POCT glycosylated hemoglobin (Hb A1C)  -     empagliflozin (JARDIANCE) 10 MG tablet; Take 1 tablet by mouth daily, Disp-30 tablet, R-5Normal  2. Essential hypertension  3. Anxiety  -     buPROPion (WELLBUTRIN) 75 MG tablet; Take 1 tablet by mouth daily, Disp-30 tablet, R-2Normal  4. Left-sided chest wall pain  -     tiZANidine (ZANAFLEX) 4 MG tablet; Take 1 tablet by mouth nightly as needed (muscle spasm), Disp-10 tablet, R-1Normal  5. Personal history of tobacco use  -     [place] VISIT TO DISCUSS LUNG CA SCREEN W LDCT  -     CT Lung Screen (Annual); Future  6. Need for prophylactic vaccination and inoculation against varicella    Type 2 diabetes-POCT A1c today 9.1, continue Tresiba 20 units daily, Metformin 500 mg twice daily and Trulicity 3.21 mg/week. Started on Jardiance 10 mg daily due to history of microalbuminuria. Not on ACE inhibitor or statin    Hypertension-controlled, continue amlodipine 5 mg daily and atenolol 50 mg daily    Anxiety-started on Wellbutrin 75 mg daily    Left-sided chest pain-likely musculoskeletal, advised to take muscle relaxers as needed      Return in about 3 months (around 6/15/2022) for Follow up DM/HTN. Orders Placed This Encounter   Procedures    CT Lung Screen (Annual)     Age: Patient is 61 y.o. Smoking History: Social History    Tobacco Use      Smoking status: Former Smoker        Packs/day: 2.00        Years: 44.00        Pack years: 80        Quit date: 2019        Years since quittin.7      Smokeless tobacco: Never Used    Vaping Use      Vaping Use: Former        Start date: 1/10/2019        Quit date: 6/10/2019    Alcohol use: Not Currently    Drug use: Never   Pack years: 80    Date of last lung cancer screening: 10/28/2020     Standing Status:   Future     Standing Expiration Date:   9/15/2023     Order Specific Question:   Is there documentation of shared decision making? Answer:   Yes     Order Specific Question:   Is this a low dose CT or a routine CT? Answer:   Low Dose CT [1]     Order Specific Question:   Is this the first (baseline) CT or an annual exam?     Answer: Annual [2]     Order Specific Question:   Does the patient show any signs or symptoms of lung cancer? Answer:   No     Order Specific Question:   Smoking Status? Answer: Former Smoker [4]     Order Specific Question:   Date quit smoking? (must be within 15 years)     Answer:   2019     Order Specific Question:   Smoking packs per day? Answer:   2     Order Specific Question:   Years smoking?      Answer:   40    POCT glycosylated hemoglobin (Hb A1C)    [place] VISIT TO DISCUSS LUNG CA SCREEN W LDCT     Orders Placed This Encounter   Medications    empagliflozin (JARDIANCE) 10 MG tablet     Sig: Take 1 tablet by mouth daily     Dispense:  30 tablet     Refill:  5    tiZANidine (ZANAFLEX) 4 MG tablet     Sig: Take 1 tablet by mouth nightly as needed (muscle spasm)     Dispense:  10 tablet     Refill:  1    buPROPion (WELLBUTRIN) 75 MG tablet     Sig: Take 1 tablet by mouth daily     Dispense:  30 tablet     Refill:  2       Patient given educational materials - see patient instructions. Discussed use, benefit, and side effects of prescribed medications. All patientquestions answered. Pt voiced understanding. Reviewed health maintenance. Instructedto continue current medications, diet and exercise. Patient agreed with treatmentplan. Follow up as directed. Electronically signed by Cindy Anderson MD on 3/16/2022 at 9:28 AM  Low Dose CT (LDCT) Lung Screening criteria met:     Age 55-77(Medicare) or 50-80 ([facility])   Pack year smoking >30 (Medicare) or >20 ([facility])   Still smoking or less than 15 year since quit   No sign or symptoms of lung cancer   > 11 months since last LDCT     Risks and benefits of lung cancer screening with LDCT scans discussed:    Significance of positive screen - False-positive LDCT results often occur. 95% of all positive results do not lead to a diagnosis of cancer. Usually further imaging can resolve most false-positive results; however, some patients may require invasive procedures. Over diagnosis risk - 10% to 12% of screen-detected lung cancer cases are over diagnosed--that is, the cancer would not have been detected in the patient's lifetime without the screening. Need for follow up screens annually to continue lung cancer screening effectiveness     Risks associated with radiation from annual LDCT- Radiation exposure is about the same as for a mammogram, which is about 1/3 of the annual background radiation exposure from everyday life. Starting screening at age 54 is not likely to increase cancer risk from radiation exposure.     Patients with comorbidities resulting in life expectancy of < 10 years, or that would preclude treatment of an abnormality identified on CT, should not be screened due to lack of benefit.     To obtain maximal benefit from this screening, smoking cessation and long-term abstinence from smoking is critical

## 2022-03-22 ENCOUNTER — CLINICAL DOCUMENTATION (OUTPATIENT)
Dept: OCCUPATIONAL THERAPY | Age: 67
End: 2022-03-22

## 2022-03-22 NOTE — DISCHARGE SUMMARY
TREATMENT LOCATION:   [] [facility] [address] [place], [place] [address]a 77: [phone]  F: [phone] [x] [address]: [phone]  F: [phone]     Lymphedema Therapy Discharge Note    Date: 3/22/2022      Patient: Audra Dejesus  :   MRN: [id number]    Referring Physician: Gale Church MD       Phone: [phone]                   Fax: [phone]  Insurance:  [employer]- [employer] after Eval - $20.00 Copay, PreAuth after eval by calling [employer]  (Outside of services area).  [phone]  Medical Diagnosis: Ductal carcinoma in situ (DCIS) of left breast  Rehab Codes: J19.6, A07.79               Onset Date: 21  Visit# / total visits: 3/3  POC UPDATE DUE AT VISIT: 5       Total visits attended: 3        Cancels/No shows: 1  Date of initial visit: 10/12/21                Date of final visit: 21    Pt no showed re-eval on 2/10/21 and did not call back for further apts       Subjective:  Refer to initial evaluation/ treatment notes. Objective:  Refer to initial evaluation/treatment notes. Assessment:  Refer to initial evaluation/ treatment notes. Treatment to Date:  [] Therapeutic Exercise           [x] Instruction in Home Program                       [] Manual Therapy  [x] Self-Care/Home Management  [] Vasopneumatic Pump             [] Other:    Discharge Status:   [x] Pt has not called or returned to clinic in over 90 days with additional concerns. Electronically signed by Lola Sheets OT on 3/22/2022 at 11:58 AM    The patient is being discharged due to the status listed above. If patient would like to return to the clinic for additional therapy a new referral will be necessary. Thank you again for your referral and allowing us to assist in the care of this patient! For any questions or concerns, please don't hesitate to call us at [phone].

## 2022-03-23 ENCOUNTER — TELEPHONE (OUTPATIENT)
Dept: ONCOLOGY | Age: 67
End: 2022-03-23

## 2022-03-23 NOTE — LETTER
3/23/2022        [address] [place] [address]    Dear Anibal Lopez: Your healthcare provider has ordered a low dose CT scan of the chest for lung cancer screening. You will find enclosed, information about CT lung screening. Please review the statement of understanding, you will be asked to sign a copy of this at the time of your CT scan    If you have not already been contacted to make the appointment for your scan, please call our scheduling department at [phone]    Keep in mind that CT lung screening does not take the place of smoking cessation. If you are a current smoker, you will find enclosed smoking cessation resources. Please do not hesitate to contact me if you have any questions or concerns.     [address] [place] [address] Lung Screening Program  [phone]

## 2022-03-24 ENCOUNTER — TELEPHONE (OUTPATIENT)
Dept: ONCOLOGY | Age: 67
End: 2022-03-24

## 2022-03-24 NOTE — TELEPHONE ENCOUNTER
Please check if her insurance is covering cost for her lung CT screen, she can get the xray done for her chest pain issue.

## 2022-03-31 ENCOUNTER — HOSPITAL ENCOUNTER (OUTPATIENT)
Dept: CT IMAGING | Age: 67
Discharge: HOME | End: 2022-04-02
Payer: COMMERCIAL

## 2022-03-31 DIAGNOSIS — Z87.891 PERSONAL HISTORY OF TOBACCO USE: ICD-10-CM

## 2022-03-31 PROCEDURE — 71271 CT THORAX LUNG CANCER SCR C-: CPT

## 2022-04-06 DIAGNOSIS — F41.9 ANXIETY: ICD-10-CM

## 2022-04-06 RX ORDER — BUPROPION HYDROCHLORIDE 75 MG/1
TABLET ORAL
Qty: 30 TABLET | Refills: 2 | Status: SHIPPED | OUTPATIENT
Start: 2022-04-06 | End: 2022-06-15

## 2022-04-06 NOTE — TELEPHONE ENCOUNTER
Drake Em is calling to request a refill on the following medication(s):    Medication Request:  Requested Prescriptions     Pending Prescriptions Disp Refills    buPROPion (WELLBUTRIN) 75 MG tablet [Pharmacy Med Name: BUPROPION HCL 75 MG TABLET] 30 tablet 2     Sig: TAKE 1 TABLET BY MOUTH EVERY DAY       Last Visit Date (If Applicable):  9/40/9814    Next Visit Date:    6/15/2022

## 2022-05-10 DIAGNOSIS — R07.89 LEFT-SIDED CHEST WALL PAIN: ICD-10-CM

## 2022-05-10 ASSESSMENT — PATIENT HEALTH QUESTIONNAIRE - PHQ9
SUM OF ALL RESPONSES TO PHQ QUESTIONS 1-9: 0
2. FEELING DOWN, DEPRESSED OR HOPELESS: 0
SUM OF ALL RESPONSES TO PHQ QUESTIONS 1-9: 0
SUM OF ALL RESPONSES TO PHQ9 QUESTIONS 1 & 2: 0
1. LITTLE INTEREST OR PLEASURE IN DOING THINGS: 0

## 2022-05-11 RX ORDER — TIZANIDINE 4 MG/1
4 TABLET ORAL NIGHTLY PRN
Qty: 10 TABLET | Refills: 1 | Status: SHIPPED | OUTPATIENT
Start: 2022-05-11 | End: 2022-08-25 | Stop reason: SDUPTHER

## 2022-05-11 NOTE — TELEPHONE ENCOUNTER
Tommie Pierre is calling to request a refill on the following medication(s):    Medication Request:  Requested Prescriptions     Pending Prescriptions Disp Refills    tiZANidine (ZANAFLEX) 4 MG tablet [Pharmacy Med Name: TIZANIDINE HCL 4 MG TABLET] 10 tablet 1     Sig: TAKE 1 TABLET BY MOUTH NIGHTLY AS NEEDED (MUSCLE SPASM).        Last Visit Date (If Applicable):  6/27/4206    Next Visit Date:    6/15/2022

## 2022-05-13 RX ORDER — AMLODIPINE BESYLATE 5 MG/1
TABLET ORAL
Qty: 90 TABLET | Refills: 1 | Status: SHIPPED | OUTPATIENT
Start: 2022-05-13 | End: 2022-08-25 | Stop reason: SDUPTHER

## 2022-05-13 RX ORDER — ATENOLOL 50 MG/1
50 TABLET ORAL DAILY
Qty: 90 TABLET | Refills: 1 | Status: SHIPPED | OUTPATIENT
Start: 2022-05-13 | End: 2022-08-25 | Stop reason: SDUPTHER

## 2022-05-13 NOTE — TELEPHONE ENCOUNTER
Jay Ayala is calling to request a refill on the following medication(s):    Medication Request:  Requested Prescriptions     Pending Prescriptions Disp Refills    amLODIPine (NORVASC) 5 MG tablet 90 tablet 1     Sig: TAKE 1 TABLET BY MOUTH EVERY DAY    atenolol (TENORMIN) 50 MG tablet 90 tablet 1     Sig: Take 1 tablet by mouth daily       Last Visit Date (If Applicable):  7/51/2040    Next Visit Date:    6/15/2022

## 2022-06-02 NOTE — TELEPHONE ENCOUNTER
Requested Prescriptions     Pending Prescriptions Disp Refills    metFORMIN (GLUCOPHAGE) 500 MG tablet 180 tablet 1     Sig: Take 1 tablet by mouth 2 times daily (with meals)

## 2022-06-15 ENCOUNTER — OFFICE VISIT (OUTPATIENT)
Dept: FAMILY MEDICINE CLINIC | Age: 67
End: 2022-06-15
Payer: COMMERCIAL

## 2022-06-15 VITALS
HEART RATE: 90 BPM | HEIGHT: 66 IN | WEIGHT: 251 LBS | BODY MASS INDEX: 40.34 KG/M2 | TEMPERATURE: 97 F | SYSTOLIC BLOOD PRESSURE: 149 MMHG | DIASTOLIC BLOOD PRESSURE: 88 MMHG

## 2022-06-15 DIAGNOSIS — I10 ESSENTIAL HYPERTENSION: ICD-10-CM

## 2022-06-15 DIAGNOSIS — I73.9 CLAUDICATION OF BOTH LOWER EXTREMITIES (HCC): ICD-10-CM

## 2022-06-15 DIAGNOSIS — E11.9 TYPE 2 DIABETES MELLITUS WITHOUT COMPLICATION, WITH LONG-TERM CURRENT USE OF INSULIN (HCC): Primary | ICD-10-CM

## 2022-06-15 DIAGNOSIS — Z79.4 TYPE 2 DIABETES MELLITUS WITHOUT COMPLICATION, WITH LONG-TERM CURRENT USE OF INSULIN (HCC): Primary | ICD-10-CM

## 2022-06-15 LAB — HBA1C MFR BLD: 9 %

## 2022-06-15 PROCEDURE — 99214 OFFICE O/P EST MOD 30 MIN: CPT | Performed by: STUDENT IN AN ORGANIZED HEALTH CARE EDUCATION/TRAINING PROGRAM

## 2022-06-15 PROCEDURE — 83036 HEMOGLOBIN GLYCOSYLATED A1C: CPT | Performed by: STUDENT IN AN ORGANIZED HEALTH CARE EDUCATION/TRAINING PROGRAM

## 2022-06-15 PROCEDURE — 3046F HEMOGLOBIN A1C LEVEL >9.0%: CPT | Performed by: STUDENT IN AN ORGANIZED HEALTH CARE EDUCATION/TRAINING PROGRAM

## 2022-06-15 SDOH — ECONOMIC STABILITY: FOOD INSECURITY: WITHIN THE PAST 12 MONTHS, THE FOOD YOU BOUGHT JUST DIDN'T LAST AND YOU DIDN'T HAVE MONEY TO GET MORE.: NEVER TRUE

## 2022-06-15 SDOH — ECONOMIC STABILITY: FOOD INSECURITY: WITHIN THE PAST 12 MONTHS, YOU WORRIED THAT YOUR FOOD WOULD RUN OUT BEFORE YOU GOT MONEY TO BUY MORE.: NEVER TRUE

## 2022-06-15 ASSESSMENT — ENCOUNTER SYMPTOMS
EYE DISCHARGE: 0
NAUSEA: 0
VOMITING: 0
SORE THROAT: 0
CONSTIPATION: 0
ABDOMINAL PAIN: 0
WHEEZING: 0
CHEST TIGHTNESS: 0
DIARRHEA: 0
COUGH: 0
SHORTNESS OF BREATH: 0

## 2022-06-15 ASSESSMENT — SOCIAL DETERMINANTS OF HEALTH (SDOH): HOW HARD IS IT FOR YOU TO PAY FOR THE VERY BASICS LIKE FOOD, HOUSING, MEDICAL CARE, AND HEATING?: NOT HARD AT ALL

## 2022-06-15 NOTE — PROGRESS NOTES
[address] PRIMARY CARE  [address] [place] [id number]  Dept: [phone]  Dept Fax: [phone]    Drake Em is a 61 y.o. female who is a Established patient, who presents today for her medical conditions/complaints as noted below:  Chief Complaint   Patient presents with    Hypertension    Diabetes     januvia- didnt get to start yet    Leg Swelling     feels like a rubber band is around her ankles     Depression     pt stopped taking medication made her stay in bed for 1 week     Leg Pain     x 1 month, shooting and crampin pain          HPI:     She is here today to follow-up on diabetes and hypertension. She says that she was started on Jardiance at last visit but she has not started taking it yet because of the side effect of increased urination. She says that she was traveling out of town so she did not want to started at that time. She says that she is trying to get more physically active but she gets tightening feeling in her legs and around her ankles when she walks. She also complains of having leg cramps after walking. Says that she tries to increase her potassium intake by eating more bananas when she has the cramps. She does have varicose veins with discoloration of her feet and ankles. Her blood pressures are elevated today and she says that she is under a lot of stress lately after losing her dog. Hemoglobin A1C (%)   Date Value   06/15/2022 9.0   03/15/2022 9.1   10/14/2021 9.6 (H)             ( goal A1Cis < 7)   Microalb/Crt.  Ratio (mcg/mg creat)   Date Value   10/14/2021 15     LDL Cholesterol (mg/dL)   Date Value   10/14/2021 108   04/27/2021 108   02/18/2020 140 (H)       (goal LDL is <100)   AST (U/L)   Date Value   10/14/2021 22     ALT (U/L)   Date Value   10/14/2021 22     BUN (mg/dL)   Date Value   10/14/2021 8     BP Readings from Last 3 Encounters:   06/15/22 (!) 149/88   03/15/22 137/88   10/22/21 137/82 (goal 120/80)    Past Medical History:   Diagnosis Date    Breast cancer ([address]     left breast    Diabetes mellitus ([address]     Emphysema (subcutaneous) (surgical) resulting from a procedure     History of therapeutic radiation     Hx of blood clots     left leg    Hypertension     Kidney calculi     passed on her own    Neuropathy     DIONI on CPAP     uses nightly    Prolonged emergence from general anesthesia     Sleep apnea       Past Surgical History:   Procedure Laterality Date    BREAST BIOPSY Left 12/15/2020    LEFT BREAST LUMPECTOMY WITH   NEEDLE LOCALIZATION (11:30) performed by Perlita Garber DO at [address] [place] [address]       removed polyps    COLONOSCOPY  10/26/2020    with Biopsy    COLONOSCOPY N/A 10/26/2020    COLONOSCOPY POLYPECTOMY  BIOPSY performed by Jamal Horne MD at [address], COLON, DIAGNOSTIC      KNEE ARTHROSCOPY Left 1995    [facility] STEROTACT[facility] LOC BREAST BIOPSY LEFT  11/10/2020    HARI STEROTACTIC LOC BREAST BIOPSY LEFT 11/10/2020 [facility]    OTHER SURGICAL HISTORY      DNC for post menapausal bleeding    TONSILLECTOMY      US THYROID BIOPSY         Family History   Problem Relation Age of Onset    Breast Cancer Mother     Heart Defect Mother         Murmur     Alzheimer's Disease Mother     Diabetes type 2  Mother     Heart Disease Father     Other Father         AA       Social History     Tobacco Use    Smoking status: Former Smoker     Packs/day: 2.00     Years: 44.00     Pack years: 88.00     Quit date: 6/2/2019     Years since quitting: 3.0    Smokeless tobacco: Never Used   Substance Use Topics    Alcohol use: Not Currently      Current Outpatient Medications   Medication Sig Dispense Refill    metFORMIN (GLUCOPHAGE) 500 MG tablet Take 1 tablet by mouth 2 times daily (with meals) 180 tablet 1    amLODIPine (NORVASC) 5 MG tablet TAKE 1 TABLET BY MOUTH EVERY DAY 90 tablet 1    atenolol (TENORMIN) 50 MG tablet Take 1 tablet by mouth daily 90 tablet 1    tiZANidine (ZANAFLEX) 4 MG tablet TAKE 1 TABLET BY MOUTH NIGHTLY AS NEEDED (MUSCLE SPASM). 10 tablet 1    Dulaglutide (TRULICITY) 5.86 EL/0.9LX SOPN Inject 0.75 mg into the skin once a week 3 pen 2    pantoprazole (PROTONIX) 40 MG tablet TAKE 1 TABLET BY MOUTH EVERY DAY 90 tablet 1    Insulin Degludec (TRESIBA FLEXTOUCH) 200 UNIT/ML SOPN INJECT 20 UNITS INTO THE SKIN DAILY 3 pen 1    Insulin Pen Needle 32G X 4 MM MISC 1 each by Does not apply route daily 100 each 3    Multiple Vitamin (MULTI-VITAMIN DAILY PO) Take by mouth daily      aspirin 81 MG tablet Take 81 mg by mouth daily      Krill Oil 350 MG CAPS Take 500 mg by mouth       empagliflozin (JARDIANCE) 10 MG tablet Take 1 tablet by mouth daily (Patient not taking: Reported on 6/15/2022) 30 tablet 5     No current facility-administered medications for this visit.      Allergies   Allergen Reactions    Augmentin [Amoxicillin-Pot Clavulanate] Nausea And Vomiting    Bactrim [Sulfamethoxazole-Trimethoprim] Nausea And Vomiting    Vancomycin Rash    Dolobid [Diflunisal] Nausea And Vomiting       Health Maintenance   Topic Date Due    Shingles vaccine (1 of 2) Never done    Pneumococcal 0-64 years Vaccine (2 - PCV) 08/17/2018    Diabetic retinal exam  08/21/2021    COVID-19 Vaccine (4 - Booster) 03/30/2022    Diabetic foot exam  07/07/2022    A1C test (Diabetic or Prediabetic)  09/15/2022    Diabetic microalbuminuria test  10/14/2022    Lipids  10/14/2022    Breast cancer screen  12/06/2022    Low dose CT lung screening  03/31/2023    Depression Screen  05/10/2023    Colorectal Cancer Screen  10/26/2023    Cervical cancer screen  07/06/2025    DTaP/Tdap/Td vaccine (2 - Td or Tdap) 08/20/2030    Flu vaccine  Completed    Hepatitis C screen  Completed    HIV screen  Completed    Hepatitis A vaccine  Aged Out    Hib vaccine  Aged Out    Meningococcal (ACWY) vaccine  Aged Out Subjective:     Review of Systems   Constitutional: Negative for appetite change, fatigue and fever. HENT: Negative for congestion, ear pain, hearing loss and sore throat. Eyes: Negative for discharge and visual disturbance. Respiratory: Negative for cough, chest tightness, shortness of breath and wheezing. Cardiovascular: Negative for chest pain, palpitations and leg swelling. Gastrointestinal: Negative for abdominal pain, constipation, diarrhea, nausea and vomiting. Genitourinary: Negative for flank pain, frequency, hematuria and urgency. Musculoskeletal: Positive for myalgias. Negative for arthralgias, gait problem and joint swelling. Skin: Negative. Neurological: Negative for dizziness, weakness, numbness and headaches. Psychiatric/Behavioral: Negative for dysphoric mood. The patient is nervous/anxious. Objective:     Physical Exam  Vitals reviewed. Constitutional:       Appearance: Normal appearance. She is normal weight. HENT:      Head: Normocephalic and atraumatic. Nose: Nose normal.      Mouth/Throat:      Mouth: Mucous membranes are moist.      Pharynx: Oropharynx is clear. Eyes:      Extraocular Movements: Extraocular movements intact. Conjunctiva/sclera: Conjunctivae normal.      Pupils: Pupils are equal, round, and reactive to light. Cardiovascular:      Rate and Rhythm: Normal rate and regular rhythm. Heart sounds: Normal heart sounds. No murmur heard. No gallop. Pulmonary:      Effort: Pulmonary effort is normal. No respiratory distress. Breath sounds: Normal breath sounds. No stridor. No wheezing. Abdominal:      General: Bowel sounds are normal. There is no distension. Palpations: Abdomen is soft. Tenderness: There is no abdominal tenderness. There is no guarding or rebound. Musculoskeletal:         General: No swelling or tenderness. Normal range of motion. Cervical back: Normal range of motion and neck supple. Comments: Bilateral lower extremity-normal pedal pulses, discoloration of skin noted on both ankles with prominent superficial varicose veins   Skin:     General: Skin is warm and dry. Coloration: Skin is not jaundiced. Findings: No rash. Neurological:      General: No focal deficit present. Mental Status: She is alert and oriented to person, place, and time. Psychiatric:         Mood and Affect: Mood normal.         Behavior: Behavior normal.         Thought Content: Thought content normal.         Judgment: Judgment normal.       BP (!) 149/88 (Site: Right Upper Arm, Position: Sitting, Cuff Size: Medium Adult)   Pulse 90   Temp 97 °F (36.1 °C) (Temporal)   Ht 5' 6\" (1.676 m)   Wt 251 lb (113.9 kg)   BMI 40.51 kg/m²     Assessment/Plan:   1. Type 2 diabetes mellitus without complication, with long-term current use of insulin (HCC)  -     POCT glycosylated hemoglobin (Hb A1C)  2. Essential hypertension  3. Claudication of both lower extremities (HCC)  -     VL LOWER EXTREMITY ARTERIAL SEGMENTAL PRESSURES W PPG; Future    Type 2 diabetes-POCT A1c today 9.0, advised to start taking Jardiance 10 mg daily, continue Trulicity 4.31 mg/week, metformin 500 mg twice daily. Advised to increase Tresiba from 18 to 20 units at bedtime and increase in increments of 2 if blood sugars are still elevated. Hypertension-poorly controlled, on atenolol 50 mg daily and amlodipine 5 mg daily. Says that she was previously tried on losartan and that did not work for her. Lower leg claudication- will rule out any peripheral vascular issue    Return in about 3 months (around 9/15/2022) for Follow up DM/HTN.     Orders Placed This Encounter   Procedures    VL LOWER EXTREMITY ARTERIAL SEGMENTAL PRESSURES W PPG     Standing Status:   Future     Standing Expiration Date:   12/15/2022     Order Specific Question:   Reason for exam:     Answer:   pain    POCT glycosylated hemoglobin (Hb A1C)     No orders of the defined types were placed in this encounter. Patient given educational materials - see patient instructions. Discussed use, benefit, and side effects of prescribed medications. All patientquestions answered. Pt voiced understanding. Reviewed health maintenance. Instructedto continue current medications, diet and exercise. Patient agreed with treatmentplan. Follow up as directed.      Electronically signed by Aki Post MD on 6/15/2022 at 4:28 PM

## 2022-06-21 ENCOUNTER — HOSPITAL ENCOUNTER (OUTPATIENT)
Dept: VASCULAR LAB | Age: 67
Discharge: HOME | End: 2022-06-21
Payer: COMMERCIAL

## 2022-06-21 DIAGNOSIS — I73.9 CLAUDICATION OF BOTH LOWER EXTREMITIES (HCC): ICD-10-CM

## 2022-06-21 PROCEDURE — 93923 UPR/LXTR ART STDY 3+ LVLS: CPT

## 2022-07-15 DIAGNOSIS — E11.65 TYPE 2 DIABETES MELLITUS WITH HYPERGLYCEMIA (HCC): ICD-10-CM

## 2022-07-15 RX ORDER — INSULIN DEGLUDEC 200 U/ML
INJECTION, SOLUTION SUBCUTANEOUS
Qty: 3 PEN | Refills: 1 | Status: SHIPPED | OUTPATIENT
Start: 2022-07-15 | End: 2022-08-25 | Stop reason: SDUPTHER

## 2022-07-15 NOTE — TELEPHONE ENCOUNTER
Pharmacy called in requesting refill please advise thank you!         Arpit Cole is calling to request a refill on the following medication(s):    Medication Request:  Requested Prescriptions     Pending Prescriptions Disp Refills    Insulin Degludec (TRESIBA FLEXTOUCH) 200 UNIT/ML SOPN 3 pen 1     Sig: INJECT 20 UNITS INTO THE SKIN DAILY       Last Visit Date (If Applicable):  8/17/9489    Next Visit Date:    8/25/2022

## 2022-08-09 RX ORDER — PANTOPRAZOLE SODIUM 40 MG/1
TABLET, DELAYED RELEASE ORAL
Qty: 90 TABLET | Refills: 1 | Status: SHIPPED | OUTPATIENT
Start: 2022-08-09 | End: 2022-08-25 | Stop reason: SDUPTHER

## 2022-08-09 NOTE — TELEPHONE ENCOUNTER
Mitchel Hines is calling to request a refill on the following medication(s):    Medication Request:  Requested Prescriptions     Pending Prescriptions Disp Refills    pantoprazole (PROTONIX) 40 MG tablet [Pharmacy Med Name: PANTOPRAZOLE SOD DR 40 MG TAB] 90 tablet 1     Sig: TAKE 1 TABLET BY MOUTH EVERY DAY       Last Visit Date (If Applicable):  7/14/8767    Next Visit Date:    8/25/2022

## 2022-08-25 ENCOUNTER — OFFICE VISIT (OUTPATIENT)
Dept: FAMILY MEDICINE CLINIC | Age: 67
End: 2022-08-25
Payer: MEDICARE

## 2022-08-25 VITALS
HEART RATE: 74 BPM | SYSTOLIC BLOOD PRESSURE: 138 MMHG | DIASTOLIC BLOOD PRESSURE: 86 MMHG | WEIGHT: 250 LBS | TEMPERATURE: 97 F | HEIGHT: 66 IN | BODY MASS INDEX: 40.18 KG/M2

## 2022-08-25 DIAGNOSIS — Z23 NEED FOR VACCINATION FOR PNEUMOCOCCUS: ICD-10-CM

## 2022-08-25 DIAGNOSIS — Z00.00 WELCOME TO MEDICARE PREVENTIVE VISIT: Primary | ICD-10-CM

## 2022-08-25 DIAGNOSIS — K21.9 GASTROESOPHAGEAL REFLUX DISEASE, UNSPECIFIED WHETHER ESOPHAGITIS PRESENT: ICD-10-CM

## 2022-08-25 DIAGNOSIS — G56.02 LEFT CARPAL TUNNEL SYNDROME: ICD-10-CM

## 2022-08-25 DIAGNOSIS — E11.65 TYPE 2 DIABETES MELLITUS WITH HYPERGLYCEMIA, WITH LONG-TERM CURRENT USE OF INSULIN (HCC): ICD-10-CM

## 2022-08-25 DIAGNOSIS — Z78.0 POSTMENOPAUSAL: ICD-10-CM

## 2022-08-25 DIAGNOSIS — Z79.4 TYPE 2 DIABETES MELLITUS WITH HYPERGLYCEMIA, WITH LONG-TERM CURRENT USE OF INSULIN (HCC): ICD-10-CM

## 2022-08-25 DIAGNOSIS — I10 ESSENTIAL HYPERTENSION: ICD-10-CM

## 2022-08-25 DIAGNOSIS — R07.89 LEFT-SIDED CHEST WALL PAIN: ICD-10-CM

## 2022-08-25 PROCEDURE — 3017F COLORECTAL CA SCREEN DOC REV: CPT | Performed by: STUDENT IN AN ORGANIZED HEALTH CARE EDUCATION/TRAINING PROGRAM

## 2022-08-25 PROCEDURE — 1123F ACP DISCUSS/DSCN MKR DOCD: CPT | Performed by: STUDENT IN AN ORGANIZED HEALTH CARE EDUCATION/TRAINING PROGRAM

## 2022-08-25 PROCEDURE — 3046F HEMOGLOBIN A1C LEVEL >9.0%: CPT | Performed by: STUDENT IN AN ORGANIZED HEALTH CARE EDUCATION/TRAINING PROGRAM

## 2022-08-25 PROCEDURE — G0402 INITIAL PREVENTIVE EXAM: HCPCS | Performed by: STUDENT IN AN ORGANIZED HEALTH CARE EDUCATION/TRAINING PROGRAM

## 2022-08-25 PROCEDURE — 90471 IMMUNIZATION ADMIN: CPT | Performed by: STUDENT IN AN ORGANIZED HEALTH CARE EDUCATION/TRAINING PROGRAM

## 2022-08-25 PROCEDURE — 90677 PCV20 VACCINE IM: CPT | Performed by: STUDENT IN AN ORGANIZED HEALTH CARE EDUCATION/TRAINING PROGRAM

## 2022-08-25 RX ORDER — ATENOLOL 50 MG/1
50 TABLET ORAL DAILY
Qty: 90 TABLET | Refills: 1 | Status: SHIPPED | OUTPATIENT
Start: 2022-08-25

## 2022-08-25 RX ORDER — PANTOPRAZOLE SODIUM 40 MG/1
TABLET, DELAYED RELEASE ORAL
Qty: 90 TABLET | Refills: 1 | Status: SHIPPED | OUTPATIENT
Start: 2022-08-25

## 2022-08-25 RX ORDER — AMLODIPINE BESYLATE 5 MG/1
TABLET ORAL
Qty: 90 TABLET | Refills: 1 | Status: SHIPPED | OUTPATIENT
Start: 2022-08-25

## 2022-08-25 RX ORDER — INSULIN DEGLUDEC 200 U/ML
INJECTION, SOLUTION SUBCUTANEOUS
Qty: 3 PEN | Refills: 1 | Status: SHIPPED | OUTPATIENT
Start: 2022-08-25

## 2022-08-25 RX ORDER — FLASH GLUCOSE SCANNING READER
EACH MISCELLANEOUS
Qty: 1 EACH | Refills: 1 | Status: SHIPPED | OUTPATIENT
Start: 2022-08-25

## 2022-08-25 RX ORDER — TIZANIDINE 4 MG/1
4 TABLET ORAL NIGHTLY PRN
Qty: 10 TABLET | Refills: 1 | Status: SHIPPED | OUTPATIENT
Start: 2022-08-25

## 2022-08-25 RX ORDER — FLASH GLUCOSE SENSOR
KIT MISCELLANEOUS
Qty: 1 EACH | Refills: 1 | Status: SHIPPED | OUTPATIENT
Start: 2022-08-25

## 2022-08-25 RX ORDER — DULAGLUTIDE 0.75 MG/.5ML
0.75 INJECTION, SOLUTION SUBCUTANEOUS WEEKLY
Qty: 4 PEN | Refills: 1 | Status: SHIPPED | OUTPATIENT
Start: 2022-08-25 | End: 2022-10-17

## 2022-08-25 RX ORDER — DULAGLUTIDE 0.75 MG/.5ML
0.75 INJECTION, SOLUTION SUBCUTANEOUS WEEKLY
Qty: 3 PEN | Refills: 2 | Status: CANCELLED | OUTPATIENT
Start: 2022-08-25

## 2022-08-25 ASSESSMENT — PATIENT HEALTH QUESTIONNAIRE - PHQ9
SUM OF ALL RESPONSES TO PHQ QUESTIONS 1-9: 0
SUM OF ALL RESPONSES TO PHQ QUESTIONS 1-9: 0
SUM OF ALL RESPONSES TO PHQ9 QUESTIONS 1 & 2: 0
SUM OF ALL RESPONSES TO PHQ QUESTIONS 1-9: 0
SUM OF ALL RESPONSES TO PHQ QUESTIONS 1-9: 0
2. FEELING DOWN, DEPRESSED OR HOPELESS: 0
1. LITTLE INTEREST OR PLEASURE IN DOING THINGS: 0

## 2022-08-25 ASSESSMENT — LIFESTYLE VARIABLES: HOW OFTEN DO YOU HAVE A DRINK CONTAINING ALCOHOL: NEVER

## 2022-08-25 NOTE — PROGRESS NOTES
Medicare Annual Wellness Visit    Arpit Cole is here for Medicare AWV, Other (Needs cpap presciption and office notes sent to [facility] /), and Carpal Tunnel (Wants a referral /)    Assessment & Plan   Welcome to Medicare preventive visit  Postmenopausal  -     DEXA BONE DENSITY 2 SITES; Future  Need for vaccination for pneumococcus  -     Pneumococcal, PCV20, PREVNAR 20, (age 25 yrs+), IM, PF  Essential hypertension  -     amLODIPine (NORVASC) 5 MG tablet; TAKE 1 TABLET BY MOUTH EVERY DAY, Disp-90 tablet, R-1Normal  -     atenolol (TENORMIN) 50 MG tablet; Take 1 tablet by mouth daily, Disp-90 tablet, R-1Normal  Type 2 diabetes mellitus with hyperglycemia, with long-term current use of insulin (HCC)  -     Insulin Degludec (TRESIBA FLEXTOUCH) 200 UNIT/ML SOPN; INJECT 20 UNITS INTO THE SKIN DAILY, Disp-3 pen, R-1Normal  -     metFORMIN (GLUCOPHAGE) 500 MG tablet; Take 1 tablet by mouth 2 times daily (with meals), Disp-180 tablet, R-1Normal  -     Dulaglutide (TRULICITY) 3.09 HD/5.1ZR SOPN; Inject 0.75 mg into the skin once a week, Disp-4 pen, R-1Normal  -     Continuous Blood Gluc Sensor (FREESTYLE VENECIA 14 DAY SENSOR) MISC; Use as directed, Disp-1 each, R-1Normal  -     Continuous Blood Gluc  (FREESTYLE VENECIA 14 DAY READER) HERBER; Use as directed, Disp-1 each, R-1Normal  Gastroesophageal reflux disease, unspecified whether esophagitis present  -     pantoprazole (PROTONIX) 40 MG tablet; TAKE 1 TABLET BY MOUTH EVERY DAY, Disp-90 tablet, R-1Normal  Left-sided chest wall pain  -     tiZANidine (ZANAFLEX) 4 MG tablet;  Take 1 tablet by mouth nightly as needed (muscle spasm), Disp-10 tablet, R-1Normal  Left carpal tunnel syndrome  -     Nai Aldana PA, [facility]    Recommendations for Preventive Services Due: see orders and patient instructions/AVS.  Recommended screening schedule for the next 5-10 years is provided to the patient in written form: see Patient Instructions/AVS. Return for Medicare Annual Wellness Visit in 1 year. Subjective   The following acute and/or chronic problems were also addressed today: She has started taking Jardiance 10 mg daily and has had no issues. Could not tolerate higher dose of Trulicity and had to be switched back to 0.75 mg/week. Says that blood sugars have been running higher lately due to stress. Has been eating more on the go meals. Takes muscle relaxers as needed for left wall chest pain after breast biopsy. Has had longstanding left carpal tunnel syndrome, needing referral for orthopedics. Patient's complete Health Risk Assessment and screening values have been reviewed and are found in Flowsheets. The following problems were reviewed today and where indicated follow up appointments were made and/or referrals ordered.     Positive Risk Factor Screenings with Interventions:     Cognitive:  Clock Drawing Test (CDT): Normal  Total Score Interpretation: Abnormal Mini-Cog  Cognitive Impairment Interventions:  Normal word recall           General Health and ACP:  General  In general, how would you say your health is?: Fair  In the past 7 days, have you experienced any of the following: New or Increased Pain, New or Increased Fatigue, Loneliness, Social Isolation, Stress or Anger?: (!) Yes  Select all that apply: (!) New or Increased Fatigue  Do you get the social and emotional support that you need?: Yes  Do you have a Living Will?: (!) No    Advance Directives       Power of  Living Will ACP-Advance Directive ACP-Power of     Not on File Not on File Not on File Not on File          General Health Risk Interventions:  No Living Will: Patient declines ACP discussion/assistance    Health Habits/Nutrition:  Physical Activity: Insufficiently Active    Days of Exercise per Week: 2 days    Minutes of Exercise per Session: 60 min     Have you lost any weight without trying in the past 3 months?: No  Body mass index: (!) 40. 35  Have you seen the dentist within the past year?: (!) No  Health Habits/Nutrition Interventions:  Dental exam overdue:  patient encouraged to make appointment with his/her dentist    Hearing/Vision:  Do you or your family notice any trouble with your hearing that hasn't been managed with hearing aids?: (!) Yes  Do you have difficulty driving, watching TV, or doing any of your daily activities because of your eyesight?: No  Have you had an eye exam within the past year?: Yes  No results found. Hearing/Vision Interventions:  Hearing concerns:  patient declines any further evaluation/treatment for hearing issues            Objective   Vitals:    08/25/22 0910   BP: 138/86   Site: Right Upper Arm   Position: Sitting   Cuff Size: Large Adult   Pulse: 74   Temp: 97 °F (36.1 °C)   TempSrc: Temporal   Weight: 250 lb (113.4 kg)   Height: 5' 6\" (1.676 m)      Body mass index is 40.35 kg/m².       General Appearance: alert and oriented to person, place and time, well developed and well- nourished, in no acute distress  Skin: warm and dry, no rash or erythema  Head: normocephalic and atraumatic  Eyes: pupils equal, round, and reactive to light, extraocular eye movements intact, conjunctivae normal  ENT: tympanic membrane, external ear and ear canal normal bilaterally, nose without deformity, nasal mucosa and turbinates normal without polyps  Neck: supple and non-tender without mass, no thyromegaly or thyroid nodules, no cervical lymphadenopathy  Pulmonary/Chest: clear to auscultation bilaterally- no wheezes, rales or rhonchi, normal air movement, no respiratory distress  Cardiovascular: normal rate, regular rhythm, normal S1 and S2, no murmurs, rubs, clicks, or gallops, distal pulses intact, no carotid bruits  Abdomen: soft, non-tender, non-distended, normal bowel sounds, no masses or organomegaly  Extremities: no cyanosis, clubbing or edema  Musculoskeletal: normal range of motion, no joint swelling, deformity or MD Nhan as PCP [facility] Empaneled Provider     Reviewed and updated this visit:  Tobacco  Allergies  Meds  Problems  Med Hx  Surg Hx  Soc Hx  Fam Hx

## 2022-08-25 NOTE — PATIENT INSTRUCTIONS
Personalized Preventive Plan for Wendie Donovan - 8/25/2022  Medicare offers a range of preventive health benefits. Some of the tests and screenings are paid in full while other may be subject to a deductible, co-insurance, and/or copay. Some of these benefits include a comprehensive review of your medical history including lifestyle, illnesses that may run in your family, and various assessments and screenings as appropriate. After reviewing your medical record and screening and assessments performed today your provider may have ordered immunizations, labs, imaging, and/or referrals for you. A list of these orders (if applicable) as well as your Preventive Care list are included within your After Visit Summary for your review. Other Preventive Recommendations:    A preventive eye exam performed by an eye specialist is recommended every 1-2 years to screen for glaucoma; cataracts, macular degeneration, and other eye disorders. A preventive dental visit is recommended every 6 months. Try to get at least 150 minutes of exercise per week or 10,000 steps per day on a pedometer . Order or download the FREE \"Exercise & Physical Activity: Your Everyday Guide\" from [employer]. Call 6-264.393.4286 or search [employer] online. You need 3368-4259 mg of calcium and 8995-2734 IU of vitamin D per day. It is possible to meet your calcium requirement with diet alone, but a vitamin D supplement is usually necessary to meet this goal.  When exposed to the sun, use a sunscreen that protects against both UVA and UVB radiation with an SPF of 30 or greater. Reapply every 2 to 3 hours or after sweating, drying off with a towel, or swimming. Always wear a seat belt when traveling in a car. Always wear a helmet when riding a bicycle or motorcycle.

## 2022-09-07 ENCOUNTER — OFFICE VISIT (OUTPATIENT)
Dept: ORTHOPEDIC SURGERY | Age: 67
End: 2022-09-07
Payer: MEDICARE

## 2022-09-07 VITALS — BODY MASS INDEX: 40.18 KG/M2 | RESPIRATION RATE: 17 BRPM | WEIGHT: 250 LBS | HEIGHT: 66 IN

## 2022-09-07 DIAGNOSIS — R20.2 NUMBNESS AND TINGLING OF LEFT UPPER EXTREMITY: ICD-10-CM

## 2022-09-07 DIAGNOSIS — R20.0 NUMBNESS AND TINGLING OF LEFT UPPER EXTREMITY: ICD-10-CM

## 2022-09-07 DIAGNOSIS — G56.02 CARPAL TUNNEL SYNDROME, LEFT: Primary | ICD-10-CM

## 2022-09-07 PROCEDURE — 99203 OFFICE O/P NEW LOW 30 MIN: CPT | Performed by: PHYSICIAN ASSISTANT

## 2022-09-07 NOTE — PROGRESS NOTES
[facility] ORTHOPEDICS AND SPORTS MEDICINE  [address] [facility]  [address] [address]  Dept: [phone]    Ambulatory Orthopedic Consult      CHIEF COMPLAINT:    Chief Complaint   Patient presents with    Established New Doctor    Pain     Left hand       HISTORY OF PRESENT ILLNESS:        The patient is a 74 y.o. female who is being seen at the request of  Christian Saucedo MD for consultation and evaluation of  possible carpal tunnel syndrome on the left. Patient notes that in the past she has been diagnosed with carpal tunnel syndrome. She has had an EMG that is over 8years old. She does not remember the provider but notes [place]. Patient notes that she has completed home exercises and worn a brace on her left wrist and hand with minimal improvement in her discomfort. The patient is a known diabetic with a last documented hemoglobin A1c of 9.0. She admits to diabetic neuropathy in her feet and is unsure if her hand numbness and tingling and discomfort is from neuropathy or possible carpal tunnel syndrome.       Past Medical History:    Past Medical History:   Diagnosis Date    Breast cancer ([address]     left breast    Diabetes mellitus ([address]     Emphysema (subcutaneous) (surgical) resulting from a procedure     History of therapeutic radiation     Hx of blood clots     left leg    Hypertension     Kidney calculi     passed on her own    Neuropathy     DIONI on CPAP     uses nightly    Prolonged emergence from general anesthesia     Sleep apnea        Past Surgical History:    Past Surgical History:   Procedure Laterality Date    BREAST BIOPSY Left 12/15/2020    LEFT BREAST LUMPECTOMY WITH   NEEDLE LOCALIZATION (11:30) performed by Dawson Palmer DO at [facility] 227      removed polyps    COLONOSCOPY  10/26/2020    with Biopsy    COLONOSCOPY N/A 10/26/2020    COLONOSCOPY POLYPECTOMY  BIOPSY performed by Vivek Khan MD at [facility], COLON, DIAGNOSTIC      KNEE ARTHROSCOPY Left 1995    HARI STEROTACTIC LOC BREAST BIOPSY LEFT  11/10/2020    HARI STEROTACTIC LOC BREAST BIOPSY LEFT 11/10/2020 [facility]    OTHER SURGICAL HISTORY      DNC for post menapausal bleeding    TONSILLECTOMY      US THYROID BIOPSY         Current Medications:   Current Outpatient Medications   Medication Sig Dispense Refill    Insulin Pen Needle 32G X 4 MM MISC 1 each by Does not apply route daily 100 each 3    amLODIPine (NORVASC) 5 MG tablet TAKE 1 TABLET BY MOUTH EVERY DAY 90 tablet 1    Insulin Degludec (TRESIBA FLEXTOUCH) 200 UNIT/ML SOPN INJECT 20 UNITS INTO THE SKIN DAILY 3 pen 1    pantoprazole (PROTONIX) 40 MG tablet TAKE 1 TABLET BY MOUTH EVERY DAY 90 tablet 1    empagliflozin (JARDIANCE) 10 MG tablet Take 1 tablet by mouth daily 30 tablet 5    tiZANidine (ZANAFLEX) 4 MG tablet Take 1 tablet by mouth nightly as needed (muscle spasm) 10 tablet 1    atenolol (TENORMIN) 50 MG tablet Take 1 tablet by mouth daily 90 tablet 1    metFORMIN (GLUCOPHAGE) 500 MG tablet Take 1 tablet by mouth 2 times daily (with meals) 180 tablet 1    Dulaglutide (TRULICITY) 5.21 AD/9.2JH SOPN Inject 0.75 mg into the skin once a week 4 pen 1    Continuous Blood Gluc Sensor (FREESTYLE VENECIA 14 DAY SENSOR) MISC Use as directed 1 each 1    Continuous Blood Gluc  (FREESTYLE VENECIA 14 DAY READER) HERBER Use as directed 1 each 1    Multiple Vitamin (MULTI-VITAMIN DAILY PO) Take by mouth daily      aspirin 81 MG tablet Take 81 mg by mouth daily      Krill Oil 350 MG CAPS Take 500 mg by mouth        No current facility-administered medications for this visit.        Allergies:    Augmentin [amoxicillin-pot clavulanate], Bactrim [sulfamethoxazole-trimethoprim], Vancomycin, and Dolobid [diflunisal]    Social History:   Social History     Socioeconomic History    Marital status: Single     Spouse name: Not on file Number of children: Not on file    Years of education: Not on file    Highest education level: Not on file   Occupational History    Not on file   Tobacco Use    Smoking status: Former     Packs/day: 2.00     Years: 44.00     Pack years: 88.00     Types: Cigarettes     Quit date: 6/2/2019     Years since quitting: 3.2    Smokeless tobacco: Never   Vaping Use    Vaping Use: Former    Start date: 1/10/2019    Quit date: 6/10/2019   Substance and Sexual Activity    Alcohol use: Not Currently    Drug use: Never    Sexual activity: Not on file   Other Topics Concern    Not on file   Social History Narrative    Not on file     Social Determinants of Health     Financial Resource Strain: Low Risk     Difficulty of Paying Living Expenses: Not hard at all   Food Insecurity: No Food Insecurity    Worried About Running Out of Food in the Last Year: Never true    [address] in the Last Year: Never true   Transportation Needs: No Transportation Needs    Lack of Transportation (Medical): No    Lack of Transportation (Non-Medical): No   Physical Activity: Insufficiently Active    Days of Exercise per Week: 2 days    Minutes of Exercise per Session: 60 min   Stress: Not on file   Social Connections: Not on file   Intimate Partner Violence: Not on file   Housing Stability: Not on file       Family History:  Family History   Problem Relation Age of Onset    Breast Cancer Mother     Heart Defect Mother         Murmur     Alzheimer's Disease Mother     Diabetes type 2  Mother     Heart Disease Father     Other Father         AA         REVIEW OF SYSTEMS:  Review of Systems   Constitutional:  Negative for activity change and fever. HENT:  Negative for sneezing. Respiratory:  Negative for cough and shortness of breath. Cardiovascular:  Negative for chest pain. Gastrointestinal:  Negative for vomiting. Musculoskeletal:  Positive for arthralgias (left hand). Negative for joint swelling and myalgias.    Skin:  Negative for color change. Neurological:  Positive for numbness (left hand). Negative for weakness. Psychiatric/Behavioral:  Negative for sleep disturbance. PHYSICAL EXAM:  Resp 17   Ht 5' 6\" (1.676 m)   Wt 250 lb (113.4 kg)   BMI 40.35 kg/m²  Body mass index is 40.35 kg/m². Physical Exam  Gen: alert and oriented to person and place. Psych:  Appropriate affect; Appropriate knowledge base; Appropriate mood; No hallucinations; Head: normocephalic, atraumatic   Chest: symmetric chest excursion; nonlabored respiratory effort. Pelvis: stable; no obvious pelvis deformity  Ortho Exam  Extremity:    Inspection of the Left wrist and hand reveals no significant outward deformity. mild thenar atrophy is noted. Patient has full AROM of the Left wrist and fingers. Sensation is diminished to the median nerve distribution but intact grossly to the ulnar and radial nerve distributions. Tinels at the Left  cubital tunnel is noted to be Negative. Radial pulse is 2+ and symmetric bilaterally. The patient has full ROM of the Left elbow, shoulder, and cervical spine. Juan Miguel's sign is positive at 15 seconds. Radiology: No new imaging obtained today in office. ASSESSMENT:     1. Carpal tunnel syndrome, left    2. Numbness and tingling of left upper extremity         PLAN:       Today in office we discussed etiology and natural history of carpal tunnel syndrome on the left hand. The treatment options may include activity modification, oral anti-inflammatories, bracing, injections, advanced imaging, physical therapy and/or surgical intervention. The patient would like to proceed with:  1. Left upper extremity EMG with purse of her goal evaluation to further evaluate for presumed carpal tunnel syndrome. The patient will follow up after her EMG to discuss results we discussed that the patient should call us with any questions or concerns. The patient voiced her understanding.          Return for EMG results. Total Time: 40 min      No orders of the defined types were placed in this encounter. Orders Placed This Encounter   Procedures    EMG     Standing Status:   Future     Standing Expiration Date:   9/7/2023     Order Specific Question:   Which body part? Answer:   Bilateral Upper Extremities with Parcervicals       This note is created with the assistance of a speech recognition program.  While intending to generate a document that actually reflects the content of the visit, the document can still have some errors including those of syntax and sound a like substitutions which may escape proof reading. In such instances, actual meaning can be extrapolated by contextual diversion.        Electronically signed by Doug Gilmore PA-C on 9/8/2022 at 8:25 AM

## 2022-09-08 ENCOUNTER — TELEPHONE (OUTPATIENT)
Dept: ORTHOPEDIC SURGERY | Age: 67
End: 2022-09-08

## 2022-09-08 ASSESSMENT — ENCOUNTER SYMPTOMS
COLOR CHANGE: 0
VOMITING: 0
COUGH: 0
SHORTNESS OF BREATH: 0

## 2022-09-08 NOTE — TELEPHONE ENCOUNTER
Patient is requesting her EMG Referral for   Dr Magalys Chavarria,  [facility], be faxed at earliest convenience. Per his office they will need an order, demographics and last office visit notes. Please fax to [phone]. Thank you.

## 2022-09-08 NOTE — TELEPHONE ENCOUNTER
Patient called Dr Grey Standard office to schedule and they still say they have not received the paper work. She is asking if we can refax.    I verified fax # and # listed above is correct    Thank you

## 2022-09-29 ENCOUNTER — TELEPHONE (OUTPATIENT)
Dept: FAMILY MEDICINE CLINIC | Age: 67
End: 2022-09-29

## 2022-10-12 ENCOUNTER — OFFICE VISIT (OUTPATIENT)
Dept: ORTHOPEDIC SURGERY | Age: 67
End: 2022-10-12
Payer: MEDICARE

## 2022-10-12 VITALS — WEIGHT: 260 LBS | HEIGHT: 66 IN | BODY MASS INDEX: 41.78 KG/M2

## 2022-10-12 DIAGNOSIS — G56.03 CARPAL TUNNEL SYNDROME, BILATERAL: Primary | ICD-10-CM

## 2022-10-12 PROCEDURE — 99213 OFFICE O/P EST LOW 20 MIN: CPT | Performed by: PHYSICIAN ASSISTANT

## 2022-10-12 PROCEDURE — 1123F ACP DISCUSS/DSCN MKR DOCD: CPT | Performed by: PHYSICIAN ASSISTANT

## 2022-10-12 ASSESSMENT — ENCOUNTER SYMPTOMS
COUGH: 0
VOMITING: 0
COLOR CHANGE: 0
SHORTNESS OF BREATH: 0

## 2022-10-12 NOTE — PROGRESS NOTES
[facility] SPORTS [facility]  [address] [address] [facility] [place]  [address] [address]  Dept: [phone]  Dept Fax: [phone]        Ambulatory Follow Up      Subjective:   Bobbi Steele is a 74y.o. year old female who presents to our office today for routine followup regarding her No diagnosis found. .    Chief Complaint   Patient presents with    Follow-up     Left hand CTS - EMG results         Date of Injury: ***    HPI Bobbi Steele  is a 74 y.o. {MISC; OT HAND DOMINANCE:[id number]} hand dominant  female who presents today in follow for ***. The patient was last seen on 9/7/2022 and underwent treatment in the form of ***. The patient notes ***% improvement with the previous treatment. Review of Systems      Objective :   Ht 5' 6\" (1.676 m)   Wt 260 lb (117.9 kg)   BMI 41.97 kg/m²  Body mass index is 41.97 kg/m². General: Bobbi Steele is a 74 y.o. female who is alert and oriented and sitting comfortably in our office. Ortho Exam  MS:  ***  Neuro: alert and oriented to person and place. Eyes: Extra-ocular muscles intact  Mouth: Oral mucosa moist. No perioral lesions  Pulm: Respirations unlabored and regular. Symmetric chest excursion without outward deformity is noted. Skin: warm, well perfused  Psych:   Patient has good fund of knowledge and displays understanging of exam, diagnosis, and plan. Radiology: ***      Procedure:        Assessment:    No diagnosis found. Plan:      Discuss injection vs surgery with PCP due to the fact that the injection can cause increase in blood sugar levels    The patient does not want an injection today because of the risk of her blood sugars increasing since she is working hard to keep it in control       F/u PRN     Follow up:No follow-ups on file. Total Time: *** min      No orders of the defined types were placed in this encounter.         No orders of the defined types were placed in this encounter. This note is created with the assistance of a speech recognition program.  While intending to generate a document that actually reflects the content of the visit, the document can still have some errors including those of syntax and sound a like substitutions which may escape proof reading. In such instances, actual meaning can be extrapolated by contextual diversion.      Electronically signed by Kayley Wilkerson PA-C on 10/12/2022 at 10:10 AM

## 2022-10-16 DIAGNOSIS — Z79.4 TYPE 2 DIABETES MELLITUS WITH HYPERGLYCEMIA, WITH LONG-TERM CURRENT USE OF INSULIN (HCC): ICD-10-CM

## 2022-10-16 DIAGNOSIS — E11.65 TYPE 2 DIABETES MELLITUS WITH HYPERGLYCEMIA, WITH LONG-TERM CURRENT USE OF INSULIN (HCC): ICD-10-CM

## 2022-10-17 RX ORDER — DULAGLUTIDE 0.75 MG/.5ML
INJECTION, SOLUTION SUBCUTANEOUS
Qty: 6 ADJUSTABLE DOSE PRE-FILLED PEN SYRINGE | Refills: 1 | Status: SHIPPED | OUTPATIENT
Start: 2022-10-17

## 2022-10-17 NOTE — TELEPHONE ENCOUNTER
Danni Reveles is calling to request a refill on the following medication(s):    Medication Request:  Requested Prescriptions     Pending Prescriptions Disp Refills    TRULICITY 5.61 QJ/0.9EE SOPN [Pharmacy Med Name: TRULICITY 6.12 OM/7.3 ML PEN]  2     Sig: INJECT 1 PEN INTO THE SKIN ONCE WEEKLY       Last Visit Date (If Applicable):  7/67/0053    Next Visit Date:    Visit date not found

## 2022-11-29 ENCOUNTER — TELEPHONE (OUTPATIENT)
Dept: FAMILY MEDICINE CLINIC | Age: 67
End: 2022-11-29

## 2022-11-29 DIAGNOSIS — I10 ESSENTIAL HYPERTENSION: Primary | ICD-10-CM

## 2022-11-29 DIAGNOSIS — E11.65 TYPE 2 DIABETES MELLITUS WITH HYPERGLYCEMIA, WITH LONG-TERM CURRENT USE OF INSULIN (HCC): ICD-10-CM

## 2022-11-29 DIAGNOSIS — Z79.4 TYPE 2 DIABETES MELLITUS WITH HYPERGLYCEMIA, WITH LONG-TERM CURRENT USE OF INSULIN (HCC): ICD-10-CM

## 2022-11-29 NOTE — TELEPHONE ENCOUNTER
----- Message from Jacquie Lan sent at 11/29/2022  2:12 PM EST -----  Subject: Referral Request    Reason for referral request? Pt is requesting to have basic labs ordered   along with an A1c. She wishes to have these ordered so she can get them   completed prior to her VV appt on 12/20/2022. Please return call when   orders are active. Provider patient wants to be referred to(if known):     Provider Phone Number(if known):     Additional Information for Provider?   ---------------------------------------------------------------------------  --------------  [id number] [employer]    [phone]; OK to leave message on voicemail  ---------------------------------------------------------------------------  --------------

## 2022-12-13 ENCOUNTER — HOSPITAL ENCOUNTER (OUTPATIENT)
Age: 67
Setting detail: SPECIMEN
Discharge: HOME | End: 2022-12-13

## 2022-12-13 DIAGNOSIS — I10 ESSENTIAL HYPERTENSION: ICD-10-CM

## 2022-12-13 DIAGNOSIS — Z79.4 TYPE 2 DIABETES MELLITUS WITH HYPERGLYCEMIA, WITH LONG-TERM CURRENT USE OF INSULIN (HCC): ICD-10-CM

## 2022-12-13 DIAGNOSIS — E11.65 TYPE 2 DIABETES MELLITUS WITH HYPERGLYCEMIA, WITH LONG-TERM CURRENT USE OF INSULIN (HCC): ICD-10-CM

## 2022-12-13 LAB
ABSOLUTE EOS #: 0.25 K/UL (ref 0–0.44)
ABSOLUTE IMMATURE GRANULOCYTE: <0.03 K/UL (ref 0–0.3)
ABSOLUTE LYMPH #: 1.97 K/UL (ref 1.1–3.7)
ABSOLUTE MONO #: 0.73 K/UL (ref 0.1–1.2)
ALBUMIN SERPL-MCNC: 4.2 G/DL (ref 3.5–5.2)
ALBUMIN/GLOBULIN RATIO: 1.4 (ref 1–2.5)
ALP BLD-CCNC: 68 U/L (ref 35–104)
ALT SERPL-CCNC: 23 U/L (ref 5–33)
ANION GAP SERPL CALCULATED.3IONS-SCNC: 14 MMOL/L (ref 9–17)
AST SERPL-CCNC: 26 U/L
BASOPHILS # BLD: 1 % (ref 0–2)
BASOPHILS ABSOLUTE: 0.08 K/UL (ref 0–0.2)
BILIRUB SERPL-MCNC: 0.5 MG/DL (ref 0.3–1.2)
BUN BLDV-MCNC: 11 MG/DL (ref 8–23)
CALCIUM SERPL-MCNC: 9.6 MG/DL (ref 8.6–10.4)
CHLORIDE BLD-SCNC: 102 MMOL/L (ref 98–107)
CHOLESTEROL, FASTING: 194 MG/DL
CHOLESTEROL/HDL RATIO: 4
CO2: 23 MMOL/L (ref 20–31)
CREAT SERPL-MCNC: 0.7 MG/DL (ref 0.5–0.9)
CREATININE URINE: 215 MG/DL (ref 28–217)
EOSINOPHILS RELATIVE PERCENT: 3 % (ref 1–4)
ESTIMATED AVERAGE GLUCOSE: 206 MG/DL
GFR SERPL CREATININE-BSD FRML MDRD: >60 ML/MIN/1.73M2
GLUCOSE FASTING: 190 MG/DL (ref 70–99)
HBA1C MFR BLD: 8.8 % (ref 4–6)
HCT VFR BLD CALC: 43 % (ref 36.3–47.1)
HDLC SERPL-MCNC: 49 MG/DL
HEMOGLOBIN: 14.4 G/DL (ref 11.9–15.1)
IMMATURE GRANULOCYTES: 0 %
LDL CHOLESTEROL: 118 MG/DL (ref 0–130)
LYMPHOCYTES # BLD: 24 % (ref 24–43)
MCH RBC QN AUTO: 31.4 PG (ref 25.2–33.5)
MCHC RBC AUTO-ENTMCNC: 33.5 G/DL (ref 28.4–34.8)
MCV RBC AUTO: 93.9 FL (ref 82.6–102.9)
MICROALBUMIN/CREAT 24H UR: 18 MG/L
MICROALBUMIN/CREAT UR-RTO: 8 MCG/MG CREAT
MONOCYTES # BLD: 9 % (ref 3–12)
NRBC AUTOMATED: 0 PER 100 WBC
PDW BLD-RTO: 12.6 % (ref 11.8–14.4)
PLATELET # BLD: 275 K/UL (ref 138–453)
PMV BLD AUTO: 10.4 FL (ref 8.1–13.5)
POTASSIUM SERPL-SCNC: 4.4 MMOL/L (ref 3.7–5.3)
RBC # BLD: 4.58 M/UL (ref 3.95–5.11)
SEG NEUTROPHILS: 63 % (ref 36–65)
SEGMENTED NEUTROPHILS ABSOLUTE COUNT: 5.15 K/UL (ref 1.5–8.1)
SODIUM BLD-SCNC: 139 MMOL/L (ref 135–144)
TOTAL PROTEIN: 7.1 G/DL (ref 6.4–8.3)
TRIGLYCERIDE, FASTING: 133 MG/DL
TSH SERPL DL<=0.05 MIU/L-ACNC: 2.21 UIU/ML (ref 0.3–5)
WBC # BLD: 8.2 K/UL (ref 3.5–11.3)

## 2022-12-20 ENCOUNTER — TELEMEDICINE (OUTPATIENT)
Dept: FAMILY MEDICINE CLINIC | Age: 67
End: 2022-12-20
Payer: MEDICARE

## 2022-12-20 DIAGNOSIS — Z79.4 TYPE 2 DIABETES MELLITUS WITH HYPERGLYCEMIA, WITH LONG-TERM CURRENT USE OF INSULIN (HCC): ICD-10-CM

## 2022-12-20 DIAGNOSIS — E11.65 TYPE 2 DIABETES MELLITUS WITH HYPERGLYCEMIA, WITH LONG-TERM CURRENT USE OF INSULIN (HCC): ICD-10-CM

## 2022-12-20 DIAGNOSIS — I10 ESSENTIAL HYPERTENSION: Primary | ICD-10-CM

## 2022-12-20 DIAGNOSIS — Z12.31 ENCOUNTER FOR SCREENING MAMMOGRAM FOR MALIGNANT NEOPLASM OF BREAST: ICD-10-CM

## 2022-12-20 PROCEDURE — 1123F ACP DISCUSS/DSCN MKR DOCD: CPT | Performed by: STUDENT IN AN ORGANIZED HEALTH CARE EDUCATION/TRAINING PROGRAM

## 2022-12-20 PROCEDURE — 3052F HG A1C>EQUAL 8.0%<EQUAL 9.0%: CPT | Performed by: STUDENT IN AN ORGANIZED HEALTH CARE EDUCATION/TRAINING PROGRAM

## 2022-12-20 PROCEDURE — 99214 OFFICE O/P EST MOD 30 MIN: CPT | Performed by: STUDENT IN AN ORGANIZED HEALTH CARE EDUCATION/TRAINING PROGRAM

## 2022-12-20 ASSESSMENT — ENCOUNTER SYMPTOMS
CONSTIPATION: 0
SHORTNESS OF BREATH: 0
ABDOMINAL PAIN: 0
EYE DISCHARGE: 0
DIARRHEA: 0
SORE THROAT: 0
WHEEZING: 0
COUGH: 0
NAUSEA: 0
VOMITING: 0
CHEST TIGHTNESS: 0

## 2022-12-20 NOTE — PROGRESS NOTES
2022    TELEHEALTH EVALUATION -- Audio/Visual (During [id number]-[id number] public health emergency)    HPI:    Sixto Burleson (:  1955) has requested an audio/video evaluation for the following concern(s):    She is following up on diabetes and hypertension. She says that she has been doing well on the [place] and has had no side effects. She says that she is also lost about 4 pounds and has been working on her diet cutting down on carbs and sugars. She has severe carpal tunnel left wrist and is supposed to get surgery done. She says that she hurt her left hand recently and has been waiting for it to heal before going forward with the surgery. She had labs done recently which were all normal and her A1c had improved to 8.8. She is due for mammogram.    Review of Systems   Constitutional:  Negative for appetite change, fatigue and fever. HENT:  Negative for congestion, ear pain, hearing loss and sore throat. Eyes:  Negative for discharge and visual disturbance. Respiratory:  Negative for cough, chest tightness, shortness of breath and wheezing. Cardiovascular:  Negative for chest pain, palpitations and leg swelling. Gastrointestinal:  Negative for abdominal pain, constipation, diarrhea, nausea and vomiting. Genitourinary:  Negative for flank pain, frequency, hematuria and urgency. Musculoskeletal:  Negative for arthralgias, gait problem, joint swelling and myalgias. Skin: Negative. Neurological:  Negative for dizziness, weakness, numbness and headaches. Psychiatric/Behavioral: Negative. Negative for dysphoric mood. The patient is not nervous/anxious. Prior to Visit Medications    Medication Sig Taking?  Authorizing Provider   empagliflozin (JARDIANCE) 25 MG tablet Take 1 tablet by mouth daily Yes Humaira Ortiz MD   TRULICITY 3.85 SE/9.4LL SOPN INJECT 1 PEN INTO THE SKIN ONCE WEEKLY Yes Humaira Ortiz MD   Insulin Pen Needle 32G X 4 MM MISC 1 each by Does not apply route daily Yes Temitope Sparrow MD   amLODIPine (NORVASC) 5 MG tablet TAKE 1 TABLET BY MOUTH EVERY DAY Yes Temitope Sparrow MD   Insulin Degludec (TRESIBA FLEXTOUCH) 200 UNIT/ML SOPN INJECT 20 UNITS INTO THE SKIN DAILY Yes Temitope Sparrow MD   pantoprazole (PROTONIX) 40 MG tablet TAKE 1 TABLET BY MOUTH EVERY DAY Yes Temitope Sparrow MD   tiZANidine (ZANAFLEX) 4 MG tablet Take 1 tablet by mouth nightly as needed (muscle spasm) Yes Temitope Sparrow MD   atenolol (TENORMIN) 50 MG tablet Take 1 tablet by mouth daily Yes Temitope Sparrow MD   metFORMIN (GLUCOPHAGE) 500 MG tablet Take 1 tablet by mouth 2 times daily (with meals) Yes Temitope Sparrow MD   Multiple Vitamin (MULTI-VITAMIN DAILY PO) Take by mouth daily Yes Historical Provider, MD   aspirin 81 MG tablet Take 81 mg by mouth daily Yes Historical Provider, MD   [place] Oil 350 MG CAPS Take 500 mg by mouth  Yes Historical Provider, MD       Social History     Tobacco Use    Smoking status: Former     Packs/day: 2.00     Years: 44.00     Pack years: 88.00     Types: Cigarettes     Quit date: 6/2/2019     Years since quitting: 3.5    Smokeless tobacco: Never   Vaping Use    Vaping Use: Former    Start date: 1/10/2019    Quit date: 6/10/2019   Substance Use Topics    Alcohol use: Not Currently    Drug use: Never        Allergies   Allergen Reactions    Augmentin [Amoxicillin-Pot Clavulanate] Nausea And Vomiting    Bactrim [Sulfamethoxazole-Trimethoprim] Nausea And Vomiting    Vancomycin Rash    Dolobid [Diflunisal] Nausea And Vomiting   ,   Past Medical History:   Diagnosis Date    Breast cancer ([facility]     left breast    Diabetes mellitus ([facility]     Emphysema (subcutaneous) (surgical) resulting from a procedure     History of therapeutic radiation     Hx of blood clots     left leg    Hypertension     Kidney calculi     passed on her own    Neuropathy     DIONI on CPAP     uses nightly    Prolonged emergence from general anesthesia     Sleep apnea    ,   Past Surgical History:   Procedure Laterality Date    BREAST BIOPSY Left 12/15/2020    LEFT BREAST LUMPECTOMY WITH   NEEDLE LOCALIZATION (11:30) performed by Gaetano Hester DO at [facility] 227      removed polyps    COLONOSCOPY  10/26/2020    with Biopsy    COLONOSCOPY N/A 10/26/2020    COLONOSCOPY POLYPECTOMY  BIOPSY performed by Jax Massey MD at [facility] 68, COLON, DIAGNOSTIC      KNEE ARTHROSCOPY Left 1995    HARI STEROTACTIC LOC BREAST BIOPSY LEFT  11/10/2020    HARI STEROTACTIC LOC BREAST BIOPSY LEFT 11/10/2020 [facility]    OTHER SURGICAL HISTORY      DNC for post menapausal bleeding    TONSILLECTOMY      US THYROID BIOPSY     ,   Social History     Tobacco Use    Smoking status: Former     Packs/day: 2.00     Years: 44.00     Pack years: 88.00     Types: Cigarettes     Quit date: 6/2/2019     Years since quitting: 3.5    Smokeless tobacco: Never   Vaping Use    Vaping Use: Former    Start date: 1/10/2019    Quit date: 6/10/2019   Substance Use Topics    Alcohol use: Not Currently    Drug use: Never   ,   Family History   Problem Relation Age of Onset    Breast Cancer Mother     Heart Defect Mother         Murmur     Alzheimer's Disease Mother     Diabetes type 2  Mother     Heart Disease Father     Other Father         AA   ,   Immunization History   Administered Date(s) Administered    COVID-19, MODERNA BLUE border, Primary or Immunocompromised, (age 12y+), IM, 100 mcg/0.5mL 03/04/2021, 04/01/2021, 12/30/2021    COVID-19, [place] Vaccine, Vaccine Unspecified 03/04/2021, 04/01/2021, 12/30/2021    Influenza, FLUARIX, FLULAVAL, FLUZONE (age 10 mo+) AND AFLURIA, (age 1 y+), PF, 0.5mL 09/19/2019, 09/05/2020, 09/03/2021    Pneumococcal Polysaccharide ([id number]) 08/17/2017    Pneumococcal conjugate PCV20, PF (Prevnar 20) 08/25/2022    Tdap (Boostrix, Adacel) 08/20/2020   ,   Health Maintenance   Topic Date Due    Shingles vaccine (1 of 2) Never done    Diabetic retinal exam  08/21/2021    COVID-19 Vaccine (4 - Booster for Moderna series) 02/24/2022    Diabetic foot exam  07/07/2022    Breast cancer screen  12/06/2022    Low dose CT lung screening  03/31/2023    Depression Screen  08/25/2023    Annual Wellness Visit (AWV)  08/26/2023    Colorectal Cancer Screen  10/26/2023    A1C test (Diabetic or Prediabetic)  12/13/2023    Diabetic microalbuminuria test  12/13/2023    Lipids  12/13/2023    Cervical cancer screen  07/06/2025    DTaP/Tdap/Td vaccine (2 - Td or Tdap) 08/20/2030    DEXA (modify frequency per FRAX score)  Completed    Flu vaccine  Completed    Pneumococcal 65+ years Vaccine  Completed    Hepatitis C screen  Completed    HIV screen  Completed    Hepatitis A vaccine  Aged Out    Hib vaccine  Aged Out    Meningococcal (ACWY) vaccine  Aged Out       PHYSICAL EXAMINATION:  [ INSTRUCTIONS:  \"[x]\" Indicates a positive item  \"[]\" Indicates a negative item  -- DELETE ALL ITEMS NOT EXAMINED]  Vital Signs: (As obtained by patient/caregiver or practitioner observation)    Blood pressure-  Heart rate-    Respiratory rate-    Temperature-  Pulse oximetry-     Constitutional: [x] Appears well-developed and well-nourished [x] No apparent distress      [] Abnormal-   Mental status  [x] Alert and awake  [x] Oriented to person/place/time [x]Able to follow commands      Eyes:  EOM    [x]  Normal  [] Abnormal-  Sclera  [x]  Normal  [] Abnormal -         Discharge [x]  None visible  [] Abnormal -    HENT:   [x] Normocephalic, atraumatic.   [] Abnormal   [x] Mouth/Throat: Mucous membranes are moist.     External Ears [x] Normal  [] Abnormal-     Neck: [x] No visualized mass     Pulmonary/Chest: [x] Respiratory effort normal.  [x] No visualized signs of difficulty breathing or respiratory distress        [] Abnormal-      Musculoskeletal:   [] Normal gait with no signs of ataxia         [x] Normal range of motion of neck        [] Abnormal-       Neurological:        [x] No Facial Asymmetry (Cranial nerve 7 motor function) (limited exam to video visit)          [x] No gaze palsy        [] Abnormal-         Skin:        [x] No significant exanthematous lesions or discoloration noted on facial skin         [] Abnormal-            Psychiatric:       [x] Normal Affect [x] No Hallucinations        [] Abnormal-     Other pertinent observable physical exam findings-     ASSESSMENT/PLAN:  1. Essential hypertension    On amlodipine 5 mg daily, atenolol 50 mg daily    2. Type 2 diabetes mellitus with hyperglycemia, with long-term current use of insulin (HCC)    - empagliflozin (JARDIANCE) 25 MG tablet; Take 1 tablet by mouth daily  Dispense: 90 tablet; Refill: 1    Recent A1c 8.8, increased Jardiance to 25 mg daily, continue Trulicity 4.57 mg/week, metformin 500 mg twice daily and Tresiba 20 units daily. Continue with diabetic diet. 3. Encounter for screening mammogram for malignant neoplasm of breast    - HARI DIGITAL SCREEN W OR WO CAD BILATERAL    Return in about 3 months (around 3/20/2023) for Follow up DM/HTN. Fredy Guerrero, was evaluated through a synchronous (real-time) audio-video encounter. The patient (or guardian if applicable) is aware that this is a billable service, which includes applicable co-pays. This Virtual Visit was conducted with patient's (and/or legal guardian's) consent. The visit was conducted pursuant to the emergency declaration under the [facility], [address] [place] [address] authority and the [employer] General Act. Patient identification was verified, and a caregiver was present when appropriate. The patient was located at Home: [address] [place] [address]. Provider was located at [facility] (Appt Dept): [address] [place] [address],  [facility]. Total time spent on this encounter: Not billed by time    --Marco A Edward MD on 12/20/2022 at 4:16 PM    An electronic signature was used to authenticate this note.

## 2022-12-30 DIAGNOSIS — Z12.31 ENCOUNTER FOR SCREENING MAMMOGRAM FOR MALIGNANT NEOPLASM OF BREAST: Primary | ICD-10-CM

## 2022-12-30 DIAGNOSIS — Z85.3 HISTORY OF BREAST CANCER IN FEMALE: ICD-10-CM

## 2023-01-31 ENCOUNTER — HOSPITAL ENCOUNTER (OUTPATIENT)
Dept: ULTRASOUND IMAGING | Age: 68
Discharge: HOME | End: 2023-02-02
Payer: MEDICARE

## 2023-01-31 ENCOUNTER — HOSPITAL ENCOUNTER (OUTPATIENT)
Dept: MAMMOGRAPHY | Age: 68
Discharge: HOME | End: 2023-02-02
Payer: MEDICARE

## 2023-01-31 DIAGNOSIS — Z12.31 ENCOUNTER FOR SCREENING MAMMOGRAM FOR MALIGNANT NEOPLASM OF BREAST: ICD-10-CM

## 2023-01-31 DIAGNOSIS — Z12.31 VISIT FOR SCREENING MAMMOGRAM: ICD-10-CM

## 2023-01-31 DIAGNOSIS — Z78.0 POSTMENOPAUSAL: ICD-10-CM

## 2023-01-31 DIAGNOSIS — Z85.3 HISTORY OF BREAST CANCER IN FEMALE: ICD-10-CM

## 2023-01-31 PROCEDURE — 77066 DX MAMMO INCL CAD BI: CPT

## 2023-01-31 PROCEDURE — 77080 DXA BONE DENSITY AXIAL: CPT

## 2023-01-31 NOTE — RESULT ENCOUNTER NOTE
Please let her know that DEXA scan shows osteopenia, would recommend taking daily calcium and vitamin D supplementation, repeat DEXA scan in 2 years

## 2023-01-31 NOTE — RESULT ENCOUNTER NOTE
Please let her know that there were some changes seen in left breast, she needs an additional follow-up in 6 months with diagnostic mammogram.

## 2023-03-01 ENCOUNTER — TELEPHONE (OUTPATIENT)
Dept: ONCOLOGY | Age: 68
End: 2023-03-01

## 2023-03-01 DIAGNOSIS — Z87.891 PERSONAL HISTORY OF NICOTINE DEPENDENCE: Primary | ICD-10-CM

## 2023-03-01 NOTE — TELEPHONE ENCOUNTER
Our records indicate that your patient is coming due for their annual lung cancer screening follow up testing. For your convenience, we have pended the order for the scan for you. If you do not agree with the need for the test, please cancel the order and let us know. Sincerely,    [address] [place] [address] Screening Program    Auto printed reminder letter sent to patient.

## 2023-03-29 DIAGNOSIS — K21.9 GASTROESOPHAGEAL REFLUX DISEASE, UNSPECIFIED WHETHER ESOPHAGITIS PRESENT: ICD-10-CM

## 2023-03-29 DIAGNOSIS — E11.65 TYPE 2 DIABETES MELLITUS WITH HYPERGLYCEMIA, WITH LONG-TERM CURRENT USE OF INSULIN (HCC): ICD-10-CM

## 2023-03-29 DIAGNOSIS — I10 ESSENTIAL HYPERTENSION: ICD-10-CM

## 2023-03-29 DIAGNOSIS — R07.89 LEFT-SIDED CHEST WALL PAIN: ICD-10-CM

## 2023-03-29 DIAGNOSIS — Z79.4 TYPE 2 DIABETES MELLITUS WITH HYPERGLYCEMIA, WITH LONG-TERM CURRENT USE OF INSULIN (HCC): ICD-10-CM

## 2023-03-29 RX ORDER — ATENOLOL 50 MG/1
50 TABLET ORAL DAILY
Qty: 90 TABLET | Refills: 1 | Status: SHIPPED | OUTPATIENT
Start: 2023-03-29

## 2023-03-29 RX ORDER — AMLODIPINE BESYLATE 5 MG/1
TABLET ORAL
Qty: 90 TABLET | Refills: 1 | Status: SHIPPED | OUTPATIENT
Start: 2023-03-29

## 2023-03-29 RX ORDER — PANTOPRAZOLE SODIUM 40 MG/1
TABLET, DELAYED RELEASE ORAL
Qty: 90 TABLET | Refills: 1 | Status: SHIPPED | OUTPATIENT
Start: 2023-03-29

## 2023-03-29 RX ORDER — DULAGLUTIDE 0.75 MG/.5ML
INJECTION, SOLUTION SUBCUTANEOUS
Qty: 6 ADJUSTABLE DOSE PRE-FILLED PEN SYRINGE | Refills: 1 | Status: SHIPPED | OUTPATIENT
Start: 2023-03-29

## 2023-03-29 RX ORDER — TIZANIDINE 4 MG/1
4 TABLET ORAL NIGHTLY PRN
Qty: 10 TABLET | Refills: 1 | Status: SHIPPED | OUTPATIENT
Start: 2023-03-29

## 2023-03-29 RX ORDER — INSULIN DEGLUDEC 200 U/ML
INJECTION, SOLUTION SUBCUTANEOUS
Qty: 3 ADJUSTABLE DOSE PRE-FILLED PEN SYRINGE | Refills: 1 | Status: SHIPPED | OUTPATIENT
Start: 2023-03-29

## 2023-03-29 NOTE — TELEPHONE ENCOUNTER
Jennifer Polo is calling to request a refill on the following medication(s):    Medication Request:  Requested Prescriptions     Pending Prescriptions Disp Refills    amLODIPine (NORVASC) 5 MG tablet 90 tablet 1     Sig: TAKE 1 TABLET BY MOUTH EVERY DAY    atenolol (TENORMIN) 50 MG tablet 90 tablet 1     Sig: Take 1 tablet by mouth daily    metFORMIN (GLUCOPHAGE) 500 MG tablet 180 tablet 1     Sig: Take 1 tablet by mouth 2 times daily (with meals)    pantoprazole (PROTONIX) 40 MG tablet 90 tablet 1     Sig: TAKE 1 TABLET BY MOUTH EVERY DAY    Insulin Degludec (TRESIBA FLEXTOUCH) 200 UNIT/ML SOPN       Sig: INJECT 20 UNITS INTO THE SKIN DAILY    Dulaglutide (TRULICITY) 9.78 JF/4.9KS SOPN 6 Adjustable Dose Pre-filled Pen Syringe 1     Sig: INJECT 1 PEN INTO THE SKIN ONCE WEEKLY    tiZANidine (ZANAFLEX) 4 MG tablet 10 tablet 1     Sig: Take 1 tablet by mouth nightly as needed (muscle spasm)       Last Visit Date (If Applicable):  18/57/7335    Next Visit Date:    5/3/2023

## 2023-05-09 ENCOUNTER — OFFICE VISIT (OUTPATIENT)
Dept: FAMILY MEDICINE CLINIC | Age: 68
End: 2023-05-09

## 2023-05-09 VITALS
SYSTOLIC BLOOD PRESSURE: 126 MMHG | BODY MASS INDEX: 39.7 KG/M2 | WEIGHT: 247 LBS | HEIGHT: 66 IN | OXYGEN SATURATION: 97 % | DIASTOLIC BLOOD PRESSURE: 70 MMHG | HEART RATE: 82 BPM | TEMPERATURE: 97.2 F

## 2023-05-09 DIAGNOSIS — I25.10 CORONARY ARTERY CALCIFICATION: ICD-10-CM

## 2023-05-09 DIAGNOSIS — Z79.4 TYPE 2 DIABETES MELLITUS WITH HYPERGLYCEMIA, WITH LONG-TERM CURRENT USE OF INSULIN (HCC): Primary | ICD-10-CM

## 2023-05-09 DIAGNOSIS — E66.01 SEVERE OBESITY (BMI 35.0-39.9) WITH COMORBIDITY (HCC): ICD-10-CM

## 2023-05-09 DIAGNOSIS — I25.84 CORONARY ARTERY CALCIFICATION: ICD-10-CM

## 2023-05-09 DIAGNOSIS — E11.65 TYPE 2 DIABETES MELLITUS WITH HYPERGLYCEMIA, WITH LONG-TERM CURRENT USE OF INSULIN (HCC): Primary | ICD-10-CM

## 2023-05-09 DIAGNOSIS — C50.912 MALIGNANT NEOPLASM OF LEFT FEMALE BREAST, UNSPECIFIED ESTROGEN RECEPTOR STATUS, UNSPECIFIED SITE OF BREAST (HCC): ICD-10-CM

## 2023-05-09 DIAGNOSIS — I10 ESSENTIAL HYPERTENSION: ICD-10-CM

## 2023-05-09 PROBLEM — I73.9 CLAUDICATION OF BOTH LOWER EXTREMITIES (HCC): Status: ACTIVE | Noted: 2023-05-09

## 2023-05-09 PROBLEM — I73.9 CLAUDICATION OF BOTH LOWER EXTREMITIES (HCC): Status: RESOLVED | Noted: 2023-05-09 | Resolved: 2023-05-09

## 2023-05-09 LAB — HBA1C MFR BLD: 9.7 %

## 2023-05-09 RX ORDER — ATORVASTATIN CALCIUM 20 MG/1
20 TABLET, FILM COATED ORAL DAILY
Qty: 30 TABLET | Refills: 5 | Status: SHIPPED | OUTPATIENT
Start: 2023-05-09

## 2023-05-09 RX ORDER — FLASH GLUCOSE SENSOR
KIT MISCELLANEOUS
Qty: 1 EACH | Refills: 1 | Status: SHIPPED | OUTPATIENT
Start: 2023-05-09

## 2023-05-09 RX ORDER — FLASH GLUCOSE SCANNING READER
EACH MISCELLANEOUS
Qty: 1 EACH | Refills: 1 | Status: SHIPPED | OUTPATIENT
Start: 2023-05-09

## 2023-05-09 RX ORDER — DULAGLUTIDE 1.5 MG/.5ML
1.5 INJECTION, SOLUTION SUBCUTANEOUS WEEKLY
Qty: 4 ADJUSTABLE DOSE PRE-FILLED PEN SYRINGE | Refills: 1 | Status: SHIPPED | OUTPATIENT
Start: 2023-05-09

## 2023-05-09 SDOH — ECONOMIC STABILITY: INCOME INSECURITY: IN THE LAST 12 MONTHS, WAS THERE A TIME WHEN YOU WERE NOT ABLE TO PAY THE MORTGAGE OR RENT ON TIME?: NO

## 2023-05-09 SDOH — ECONOMIC STABILITY: HOUSING INSECURITY
IN THE LAST 12 MONTHS, WAS THERE A TIME WHEN YOU DID NOT HAVE A STEADY PLACE TO SLEEP OR SLEPT IN A SHELTER (INCLUDING NOW)?: NO

## 2023-05-09 SDOH — ECONOMIC STABILITY: FOOD INSECURITY: WITHIN THE PAST 12 MONTHS, THE FOOD YOU BOUGHT JUST DIDN'T LAST AND YOU DIDN'T HAVE MONEY TO GET MORE.: NEVER TRUE

## 2023-05-09 SDOH — ECONOMIC STABILITY: FOOD INSECURITY: WITHIN THE PAST 12 MONTHS, YOU WORRIED THAT YOUR FOOD WOULD RUN OUT BEFORE YOU GOT MONEY TO BUY MORE.: NEVER TRUE

## 2023-05-09 ASSESSMENT — ENCOUNTER SYMPTOMS
CONSTIPATION: 0
COUGH: 0
ABDOMINAL PAIN: 0
DIARRHEA: 0
CHEST TIGHTNESS: 0
SHORTNESS OF BREATH: 0
EYE DISCHARGE: 0
SORE THROAT: 0
WHEEZING: 0
VOMITING: 0
NAUSEA: 0

## 2023-05-09 ASSESSMENT — PATIENT HEALTH QUESTIONNAIRE - PHQ9
SUM OF ALL RESPONSES TO PHQ QUESTIONS 1-9: 0
SUM OF ALL RESPONSES TO PHQ QUESTIONS 1-9: 0
1. LITTLE INTEREST OR PLEASURE IN DOING THINGS: 0
2. FEELING DOWN, DEPRESSED OR HOPELESS: 0
SUM OF ALL RESPONSES TO PHQ QUESTIONS 1-9: 0
SUM OF ALL RESPONSES TO PHQ9 QUESTIONS 1 & 2: 0
SUM OF ALL RESPONSES TO PHQ QUESTIONS 1-9: 0

## 2023-05-09 ASSESSMENT — SOCIAL DETERMINANTS OF HEALTH (SDOH): HOW HARD IS IT FOR YOU TO PAY FOR THE VERY BASICS LIKE FOOD, HOUSING, MEDICAL CARE, AND HEATING?: NOT HARD AT ALL

## 2023-05-09 NOTE — PROGRESS NOTES
CPAP     uses nightly    Prolonged emergence from general anesthesia     Sleep apnea       Past Surgical History:   Procedure Laterality Date    BREAST BIOPSY Left 12/15/2020    LEFT BREAST LUMPECTOMY WITH   NEEDLE LOCALIZATION (11:30) performed by Salvatore Gabriel DO at [facility] 227      removed polyps    COLONOSCOPY  10/26/2020    with Biopsy    COLONOSCOPY N/A 10/26/2020    COLONOSCOPY POLYPECTOMY  BIOPSY performed by Manas Cuello MD at [facility], COLON, DIAGNOSTIC      KNEE ARTHROSCOPY Left 1995    [facility] STEROTACTIC LOC BREAST BIOPSY LEFT  11/10/2020    HARI STEROTACTIC LOC BREAST BIOPSY LEFT 11/10/2020 [facility]    OTHER SURGICAL HISTORY      DNC for post menapausal bleeding    TONSILLECTOMY      US THYROID BIOPSY         Family History   Problem Relation Age of Onset    Breast Cancer Mother     Heart Defect Mother         Murmur     Alzheimer's Disease Mother     Diabetes type 2  Mother     Heart Disease Father     Other Father         AA       Social History     Tobacco Use    Smoking status: Former     Packs/day: 2.00     Years: 44.00     Pack years: 88.00     Types: Cigarettes     Quit date: 6/2/2019     Years since quitting: 3.9    Smokeless tobacco: Never   Substance Use Topics    Alcohol use: Not Currently      Current Outpatient Medications   Medication Sig Dispense Refill    dulaglutide (TRULICITY) 1.5 LV/7.8NJ SC injection Inject 0.5 mLs into the skin once a week 4 Adjustable Dose Pre-filled Pen Syringe 1    atorvastatin (LIPITOR) 20 MG tablet Take 1 tablet by mouth daily 30 tablet 5    Continuous Blood Gluc Sensor (FREESTYLE VENECIA 14 DAY SENSOR) MISC Use as directed 1 each 1    Continuous Blood Gluc  (FREESTYLE VENECIA 14 DAY READER) HERBER Use as directed 1 each 1    amLODIPine (NORVASC) 5 MG tablet TAKE 1 TABLET BY MOUTH EVERY DAY 90 tablet 1    atenolol (TENORMIN) 50 MG tablet Take 1 tablet by mouth daily 90 tablet 1    pantoprazole (PROTONIX)

## 2023-05-18 ENCOUNTER — TELEPHONE (OUTPATIENT)
Dept: PHARMACY | Age: 68
End: 2023-05-18

## 2023-06-06 ENCOUNTER — HOSPITAL ENCOUNTER (OUTPATIENT)
Dept: PHARMACY | Age: 68
Setting detail: THERAPIES SERIES
Discharge: HOME | End: 2023-06-06
Payer: MEDICARE

## 2023-06-06 PROCEDURE — 99213 OFFICE O/P EST LOW 20 MIN: CPT

## 2023-06-06 NOTE — DISCHARGE INSTRUCTIONS
Goals:   Consistently increase from 1 meal daily to 2 meals daily. (Long term goal to increase to three daily measl)  Set up diabetic eye exam.  Dr Ulysses Lentz to conduct foot exam during next appointment in September. Start walking 20 minutes daily twice weekly. Take readings on CGM 3-4x daily. Start Lipitor Next Tuesday, June 13.

## 2023-06-06 NOTE — PROGRESS NOTES
glucose trends noted: None due to not checking her BS.    ASSESSMENT     Diabetes Self Management Initial Assessment    DIABETES HISTORY  What type of diabetes do you have? Type 2  Do you have any relatives with diabetes? Yes - father later in life  Have you received any type of diabetes education in the past? No  In your own words, what is diabetes? The intolerance of certain foods that cause the pancrease to  produce insulin to deal with the foods. Are you aware of any potential long-term effects of diabetes? Yes - heart disease, kidney disease, stroke, loss of limb, and neuropathy. Females:  Did you have diabetes during pregnancy? No never pregnant  Are you currently pregnant? No    DIABETES MANAGEMENT  Do you currently check your blood sugar levels at home? Yes - not regularly  What are your target blood glucose values? Fasting: unsure  2 hours after eating: unsure  What is your target A1c? Under 7     LOW blood sugar (<70mg/dL) HIGH blood sugar (>180mg/dL)   How often in last month? Never Has had elevated BS reading but is not checking regularly   What are your symptoms? headache headache   How do you treat? Eating something Did not address     Do you carry a source of fast-acting glucose in case of low blood sugar? No  Do you currently take medications for your diabetes? Yes - Trulicity and Rosalynd Shy        Do you ever skip your diabetes medications? No      How many times in the past year have you been in the hospital or ER because of diabetes? Never     OTHER MEDICAL HISTORY    How would you rate your overall current health? Did not address     Do you perform regular foot care?  Yes - encouraged her to continue, drying carefully after each shower and to get an annual foot exam.       Date of last medical foot exam:  7/2022     Date of last eye exam:  2021    Date of last dental exam: Did not address     Immunizations:   Most Recent Immunizations   Administered Date(s) Administered    [id number]

## 2023-06-07 ENCOUNTER — TELEPHONE (OUTPATIENT)
Dept: PHARMACY | Age: 68
End: 2023-06-07

## 2023-06-07 NOTE — TELEPHONE ENCOUNTER
Called patient and asked if the stinging has resolved where the sensor was placed. It has but she can still feel its' presence. She also reports that she had an elevated reading of 300 interstitial glucose when she was driving in traffic and stressed on her way home but she felt fine. .  Stress can elevate readings. I asked her to check her blood sugar via finger stick if this happens again. She did have breakfast this morning and purchased Glucerna shakes to have for breakfasts. She is logging her meals. I also asked her to accept our [facility] invitation so we can see her readings as well. She agreed to do that. I also informed her of an upcoming [employer] call on July 26-August 3rd. Set up a tele visit for Monday to review meals and interstitial glucose readings. [address] [place] [address]  Ph., CACP, Clinical Pharmacist  Anticoagulation Services, [address] [facility]  6/7/2023  3:52 PM      For Pharmacy Admin Tracking Only    Program: Medication Management  CPA in place:  Yes  Recommendation Provided To: Patient/Caregiver: 2 via Telephone  Intervention Detail: Device Training and Scheduled Appointment  Intervention Accepted By: Patient/Caregiver: 2  Gap Closed?: Yes   Time Spent (min): 10

## 2023-06-08 ENCOUNTER — TELEPHONE (OUTPATIENT)
Dept: PHARMACY | Age: 68
End: 2023-06-08

## 2023-06-08 NOTE — TELEPHONE ENCOUNTER
Called patient because I can not see her results via the [facility] 2 . She states that she needs to get a cord with 2 small ends on it to connect her tablet to the . I provided her with the  number for [facility] [phone]. She will work on getting a cord to upload results. [address] [place] [address]  Ph., CACP, Clinical Pharmacist  Anticoagulation Services, [address] [place] [address] [facility]  6/8/2023  2:37 PM    For Pharmacy Admin Tracking Only    Program: Medication Management  CPA in place:  Yes  Recommendation Provided To: Patient/Caregiver: 1 via Telephone  Intervention Detail: Device Training  Intervention Accepted By: Patient/Caregiver: 1  Gap Closed?: No   Time Spent (min): 10

## 2023-07-06 ENCOUNTER — TELEPHONE (OUTPATIENT)
Dept: FAMILY MEDICINE CLINIC | Age: 68
End: 2023-07-06

## 2023-07-21 DIAGNOSIS — E11.65 TYPE 2 DIABETES MELLITUS WITH HYPERGLYCEMIA, WITH LONG-TERM CURRENT USE OF INSULIN (HCC): ICD-10-CM

## 2023-07-21 DIAGNOSIS — Z79.4 TYPE 2 DIABETES MELLITUS WITH HYPERGLYCEMIA, WITH LONG-TERM CURRENT USE OF INSULIN (HCC): ICD-10-CM

## 2023-07-21 NOTE — TELEPHONE ENCOUNTER
Rambo Nicholas is calling to request a refill on the following medication(s):    Medication Request:  Requested Prescriptions     Pending Prescriptions Disp Refills    Continuous Blood Gluc Sensor (CrushpathSTYLE VENECIA 14 DAY SENSOR) MIS 1 each 1     Sig: Use as directed       Last Visit Date (If Applicable):  9/7/2329    Next Visit Date:    9/11/2023

## 2023-07-24 RX ORDER — FLASH GLUCOSE SENSOR
KIT MISCELLANEOUS
Qty: 1 EACH | Refills: 1 | OUTPATIENT
Start: 2023-07-24

## 2023-08-07 DIAGNOSIS — R92.8 ABNORMAL MAMMOGRAM OF LEFT BREAST: Primary | ICD-10-CM

## 2023-08-22 DIAGNOSIS — I10 ESSENTIAL HYPERTENSION: ICD-10-CM

## 2023-08-22 RX ORDER — AMLODIPINE BESYLATE 5 MG/1
TABLET ORAL
Qty: 90 TABLET | Refills: 1 | Status: SHIPPED | OUTPATIENT
Start: 2023-08-22

## 2023-08-22 RX ORDER — ATENOLOL 50 MG/1
TABLET ORAL
Qty: 90 TABLET | Refills: 1 | Status: SHIPPED | OUTPATIENT
Start: 2023-08-22

## 2023-08-22 NOTE — TELEPHONE ENCOUNTER
Garth Chavira is calling to request a refill on the following medication(s):    Medication Request:  Requested Prescriptions     Pending Prescriptions Disp Refills    atenolol (TENORMIN) 50 MG tablet [Pharmacy Med Name: ATENOLOL 50 MG TABLET] 90 tablet 1     Sig: TAKE 1 TABLET BY MOUTH EVERY DAY    amLODIPine (NORVASC) 5 MG tablet [Pharmacy Med Name: AMLODIPINE BESYLATE 5 MG TAB] 90 tablet 1     Sig: TAKE 1 TABLET BY MOUTH EVERY DAY       Last Visit Date (If Applicable):  6/3/7194    Next Visit Date:    9/11/2023

## 2023-08-29 ENCOUNTER — TELEPHONE (OUTPATIENT)
Dept: FAMILY MEDICINE CLINIC | Age: 68
End: 2023-08-29

## 2023-08-29 DIAGNOSIS — Z79.4 TYPE 2 DIABETES MELLITUS WITH HYPERGLYCEMIA, WITH LONG-TERM CURRENT USE OF INSULIN (HCC): ICD-10-CM

## 2023-08-29 DIAGNOSIS — E11.65 TYPE 2 DIABETES MELLITUS WITH HYPERGLYCEMIA, WITH LONG-TERM CURRENT USE OF INSULIN (HCC): ICD-10-CM

## 2023-08-29 DIAGNOSIS — I10 ESSENTIAL HYPERTENSION: Primary | ICD-10-CM

## 2023-08-29 NOTE — TELEPHONE ENCOUNTER
Patient called in stating that she has an upcoming appointment on 9/11 and she usually gets labs done before hand so she is wondering is you can put those annual labs in before her appointment.

## 2023-08-31 ENCOUNTER — HOSPITAL ENCOUNTER (OUTPATIENT)
Dept: MAMMOGRAPHY | Age: 68
Discharge: HOME | End: 2023-09-02
Payer: MEDICARE

## 2023-08-31 ENCOUNTER — APPOINTMENT (OUTPATIENT)
Dept: ULTRASOUND IMAGING | Age: 68
End: 2023-08-31
Payer: MEDICARE

## 2023-08-31 DIAGNOSIS — R92.8 ABNORMAL MAMMOGRAM OF LEFT BREAST: ICD-10-CM

## 2023-08-31 PROCEDURE — G0279 TOMOSYNTHESIS, MAMMO: HCPCS

## 2023-09-05 ENCOUNTER — HOSPITAL ENCOUNTER (OUTPATIENT)
Age: 68
Setting detail: SPECIMEN
Discharge: HOME | End: 2023-09-05

## 2023-09-05 DIAGNOSIS — E11.65 TYPE 2 DIABETES MELLITUS WITH HYPERGLYCEMIA, WITH LONG-TERM CURRENT USE OF INSULIN (HCC): ICD-10-CM

## 2023-09-05 DIAGNOSIS — Z79.4 TYPE 2 DIABETES MELLITUS WITH HYPERGLYCEMIA, WITH LONG-TERM CURRENT USE OF INSULIN (HCC): ICD-10-CM

## 2023-09-05 DIAGNOSIS — I10 ESSENTIAL HYPERTENSION: ICD-10-CM

## 2023-09-05 LAB
BASOPHILS # BLD: 0.05 K/UL (ref 0–0.2)
BASOPHILS NFR BLD: 1 % (ref 0–2)
EOSINOPHIL # BLD: 0.15 K/UL (ref 0–0.44)
EOSINOPHILS RELATIVE PERCENT: 2 % (ref 1–4)
ERYTHROCYTE [DISTWIDTH] IN BLOOD BY AUTOMATED COUNT: 12.2 % (ref 11.8–14.4)
HCT VFR BLD AUTO: 43.7 % (ref 36.3–47.1)
HGB BLD-MCNC: 14.4 G/DL (ref 11.9–15.1)
IMM GRANULOCYTES # BLD AUTO: <0.03 K/UL (ref 0–0.3)
IMM GRANULOCYTES NFR BLD: 0 %
LYMPHOCYTES NFR BLD: 2.13 K/UL (ref 1.1–3.7)
LYMPHOCYTES RELATIVE PERCENT: 28 % (ref 24–43)
MCH RBC QN AUTO: 30.8 PG (ref 25.2–33.5)
MCHC RBC AUTO-ENTMCNC: 33 G/DL (ref 28.4–34.8)
MCV RBC AUTO: 93.6 FL (ref 82.6–102.9)
MONOCYTES NFR BLD: 0.66 K/UL (ref 0.1–1.2)
MONOCYTES NFR BLD: 9 % (ref 3–12)
NEUTROPHILS NFR BLD: 60 % (ref 36–65)
NEUTS SEG NFR BLD: 4.71 K/UL (ref 1.5–8.1)
NRBC BLD-RTO: 0 PER 100 WBC
PLATELET # BLD AUTO: 252 K/UL (ref 138–453)
PMV BLD AUTO: 10 FL (ref 8.1–13.5)
RBC # BLD AUTO: 4.67 M/UL (ref 3.95–5.11)
WBC OTHER # BLD: 7.7 K/UL (ref 3.5–11.3)

## 2023-09-06 LAB
25(OH)D3 SERPL-MCNC: 26.4 NG/ML
ALBUMIN SERPL-MCNC: 4.1 G/DL (ref 3.5–5.2)
ALBUMIN/GLOB SERPL: 1.4 {RATIO} (ref 1–2.5)
ALP SERPL-CCNC: 83 U/L (ref 35–104)
ALT SERPL-CCNC: 18 U/L (ref 5–33)
ANION GAP SERPL CALCULATED.3IONS-SCNC: 16 MMOL/L (ref 9–17)
AST SERPL-CCNC: 21 U/L
BILIRUB SERPL-MCNC: 0.4 MG/DL (ref 0.3–1.2)
BUN SERPL-MCNC: 11 MG/DL (ref 8–23)
CALCIUM SERPL-MCNC: 9.5 MG/DL (ref 8.6–10.4)
CHLORIDE SERPL-SCNC: 103 MMOL/L (ref 98–107)
CHOLEST SERPL-MCNC: 192 MG/DL
CHOLESTEROL/HDL RATIO: 4.2
CO2 SERPL-SCNC: 19 MMOL/L (ref 20–31)
CREAT SERPL-MCNC: 0.7 MG/DL (ref 0.5–0.9)
CREAT UR-MCNC: 212.9 MG/DL (ref 28–217)
EST. AVERAGE GLUCOSE BLD GHB EST-MCNC: 180 MG/DL
GFR SERPL CREATININE-BSD FRML MDRD: >60 ML/MIN/1.73M2
GLUCOSE P FAST SERPL-MCNC: 135 MG/DL (ref 70–99)
HBA1C MFR BLD: 7.9 % (ref 4–6)
HDLC SERPL-MCNC: 46 MG/DL
LDLC SERPL CALC-MCNC: 122 MG/DL (ref 0–130)
MICROALBUMIN UR-MCNC: 23 MG/L
MICROALBUMIN/CREAT UR-RTO: 11 MCG/MG CREAT
POTASSIUM SERPL-SCNC: 4.4 MMOL/L (ref 3.7–5.3)
PROT SERPL-MCNC: 7.1 G/DL (ref 6.4–8.3)
SODIUM SERPL-SCNC: 138 MMOL/L (ref 135–144)
TRIGL SERPL-MCNC: 119 MG/DL
TSH SERPL DL<=0.05 MIU/L-ACNC: 1.77 UIU/ML (ref 0.3–5)

## 2023-09-11 ENCOUNTER — OFFICE VISIT (OUTPATIENT)
Dept: FAMILY MEDICINE CLINIC | Age: 68
End: 2023-09-11
Payer: MEDICARE

## 2023-09-11 ENCOUNTER — TELEPHONE (OUTPATIENT)
Dept: FAMILY MEDICINE CLINIC | Age: 68
End: 2023-09-11

## 2023-09-11 VITALS
OXYGEN SATURATION: 96 % | SYSTOLIC BLOOD PRESSURE: 126 MMHG | DIASTOLIC BLOOD PRESSURE: 82 MMHG | WEIGHT: 235.6 LBS | HEART RATE: 78 BPM | BODY MASS INDEX: 37.86 KG/M2 | HEIGHT: 66 IN

## 2023-09-11 DIAGNOSIS — E11.65 TYPE 2 DIABETES MELLITUS WITH HYPERGLYCEMIA, WITH LONG-TERM CURRENT USE OF INSULIN (HCC): ICD-10-CM

## 2023-09-11 DIAGNOSIS — Z00.00 INITIAL MEDICARE ANNUAL WELLNESS VISIT: Primary | ICD-10-CM

## 2023-09-11 DIAGNOSIS — Z12.31 ENCOUNTER FOR SCREENING MAMMOGRAM FOR MALIGNANT NEOPLASM OF BREAST: ICD-10-CM

## 2023-09-11 DIAGNOSIS — Z12.11 SCREEN FOR COLON CANCER: ICD-10-CM

## 2023-09-11 DIAGNOSIS — I25.10 CORONARY ARTERY CALCIFICATION: ICD-10-CM

## 2023-09-11 DIAGNOSIS — Z79.4 TYPE 2 DIABETES MELLITUS WITH HYPERGLYCEMIA, WITH LONG-TERM CURRENT USE OF INSULIN (HCC): ICD-10-CM

## 2023-09-11 DIAGNOSIS — I25.84 CORONARY ARTERY CALCIFICATION: ICD-10-CM

## 2023-09-11 DIAGNOSIS — E55.9 VITAMIN D DEFICIENCY: ICD-10-CM

## 2023-09-11 PROCEDURE — 3079F DIAST BP 80-89 MM HG: CPT | Performed by: STUDENT IN AN ORGANIZED HEALTH CARE EDUCATION/TRAINING PROGRAM

## 2023-09-11 PROCEDURE — 1123F ACP DISCUSS/DSCN MKR DOCD: CPT | Performed by: STUDENT IN AN ORGANIZED HEALTH CARE EDUCATION/TRAINING PROGRAM

## 2023-09-11 PROCEDURE — 3051F HG A1C>EQUAL 7.0%<8.0%: CPT | Performed by: STUDENT IN AN ORGANIZED HEALTH CARE EDUCATION/TRAINING PROGRAM

## 2023-09-11 PROCEDURE — G0438 PPPS, INITIAL VISIT: HCPCS | Performed by: STUDENT IN AN ORGANIZED HEALTH CARE EDUCATION/TRAINING PROGRAM

## 2023-09-11 PROCEDURE — 3074F SYST BP LT 130 MM HG: CPT | Performed by: STUDENT IN AN ORGANIZED HEALTH CARE EDUCATION/TRAINING PROGRAM

## 2023-09-11 RX ORDER — ROSUVASTATIN CALCIUM 5 MG/1
5 TABLET, COATED ORAL NIGHTLY
Qty: 30 TABLET | Refills: 3 | Status: SHIPPED | OUTPATIENT
Start: 2023-09-11

## 2023-09-11 RX ORDER — LEVOFLOXACIN 750 MG/1
TABLET ORAL
COMMUNITY
Start: 2023-08-29

## 2023-09-11 RX ORDER — DULAGLUTIDE 3 MG/.5ML
3 INJECTION, SOLUTION SUBCUTANEOUS WEEKLY
Qty: 2 ADJUSTABLE DOSE PRE-FILLED PEN SYRINGE | Refills: 1 | Status: SHIPPED | OUTPATIENT
Start: 2023-09-11

## 2023-09-11 RX ORDER — CHOLECALCIFEROL (VITAMIN D3) 125 MCG
1 CAPSULE ORAL DAILY
Qty: 30 TABLET | Refills: 2 | Status: SHIPPED | OUTPATIENT
Start: 2023-09-11 | End: 2023-12-10

## 2023-09-11 SDOH — HEALTH STABILITY: PHYSICAL HEALTH: ON AVERAGE, HOW MANY DAYS PER WEEK DO YOU ENGAGE IN MODERATE TO STRENUOUS EXERCISE (LIKE A BRISK WALK)?: 1 DAY

## 2023-09-11 SDOH — HEALTH STABILITY: PHYSICAL HEALTH: ON AVERAGE, HOW MANY MINUTES DO YOU ENGAGE IN EXERCISE AT THIS LEVEL?: 10 MIN

## 2023-09-11 ASSESSMENT — LIFESTYLE VARIABLES
HOW OFTEN DO YOU HAVE A DRINK CONTAINING ALCOHOL: 1
HOW MANY STANDARD DRINKS CONTAINING ALCOHOL DO YOU HAVE ON A TYPICAL DAY: PATIENT DOES NOT DRINK
HOW OFTEN DO YOU HAVE SIX OR MORE DRINKS ON ONE OCCASION: 1
HOW OFTEN DO YOU HAVE A DRINK CONTAINING ALCOHOL: NEVER
HOW MANY STANDARD DRINKS CONTAINING ALCOHOL DO YOU HAVE ON A TYPICAL DAY: 0

## 2023-09-11 ASSESSMENT — PATIENT HEALTH QUESTIONNAIRE - PHQ9
SUM OF ALL RESPONSES TO PHQ QUESTIONS 1-9: 0
SUM OF ALL RESPONSES TO PHQ QUESTIONS 1-9: 0
SUM OF ALL RESPONSES TO PHQ9 QUESTIONS 1 & 2: 0
SUM OF ALL RESPONSES TO PHQ QUESTIONS 1-9: 0
1. LITTLE INTEREST OR PLEASURE IN DOING THINGS: 0
2. FEELING DOWN, DEPRESSED OR HOPELESS: 0
SUM OF ALL RESPONSES TO PHQ QUESTIONS 1-9: 0

## 2023-09-11 NOTE — TELEPHONE ENCOUNTER
Patient stated that she needs an order placed for another mammogram and a colonoscopy. She stated she forgot to ask during appointment.

## 2023-09-11 NOTE — PROGRESS NOTES
MD   levoFLOXacin (LEVAQUIN) 750 MG tablet TAKE 1 TABLET (750 MG TOTAL) BY MOUTH IN THE MORNING FOR 5 DAYS.  Yes Historical Provider, MD   Dulaglutide (TRULICITY) 3 VH/7.4AC SOPN Inject 3 mg into the skin once a week Yes Aloysius Canavan, MD   rosuvastatin (CRESTOR) 5 MG tablet Take 1 tablet by mouth nightly Yes Aloysius Canavan, MD   Cholecalciferol (VITAMIN D3) 50 MCG (2000 UT) TABS Take 1 tablet by mouth daily Yes Aloysius Canavan, MD   atenolol (TENORMIN) 50 MG tablet TAKE 1 TABLET BY MOUTH EVERY DAY Yes Aloysius Canavan, MD   amLODIPine (NORVASC) 5 MG tablet TAKE 1 TABLET BY MOUTH EVERY DAY Yes Aloysius Canavan, MD   Continuous Blood Gluc Sensor (FREESTYLE VENECIA 2 SENSOR) MISC USE AS DIRECTED Yes Aloysius Canavan, MD   Continuous Blood Gluc  (FREESTYLE VENECIA 14 DAY READER) HERBER Use as directed Yes Aloysius Canavan, MD   pantoprazole (PROTONIX) 40 MG tablet TAKE 1 TABLET BY MOUTH EVERY DAY Yes Aloysius Canavan, MD   Insulin Degludec (TRESIBA FLEXTOUCH) 200 UNIT/ML SOPN INJECT 20 UNITS INTO THE SKIN DAILY Yes Aloysius Canavan, MD   tiZANidine (ZANAFLEX) 4 MG tablet Take 1 tablet by mouth nightly as needed (muscle spasm) Yes Aloysius Canavan, MD   Insulin Pen Needle 32G X 4 MM MISC 1 each by Does not apply route daily Yes Aloysius Canavan, MD   Multiple Vitamin (MULTI-VITAMIN DAILY PO) Take by mouth daily Yes Historical Provider, MD   aspirin 81 MG tablet Take 1 tablet by mouth daily Yes Historical Provider, MD   Norva Broom Oil 350 MG CAPS Take 500 mg by mouth  Yes Historical Provider, MD Trejo (Including outside providers/suppliers regularly involved in providing care):   Patient Care Team:  Aloysius Canavan, MD as PCP - General (Family Medicine)  Aloysius Canavan, MD as PCP - Empaneled Provider     Reviewed and updated this visit:  Tobacco  Allergies  Meds  Problems  Med Hx  Surg Hx  Soc Hx  Fam Hx

## 2023-09-13 DIAGNOSIS — E11.65 TYPE 2 DIABETES MELLITUS WITH HYPERGLYCEMIA, WITH LONG-TERM CURRENT USE OF INSULIN (HCC): ICD-10-CM

## 2023-09-13 DIAGNOSIS — Z79.4 TYPE 2 DIABETES MELLITUS WITH HYPERGLYCEMIA, WITH LONG-TERM CURRENT USE OF INSULIN (HCC): ICD-10-CM

## 2023-09-14 RX ORDER — INSULIN DEGLUDEC 200 U/ML
INJECTION, SOLUTION SUBCUTANEOUS
Qty: 9 ADJUSTABLE DOSE PRE-FILLED PEN SYRINGE | Refills: 1 | Status: SHIPPED | OUTPATIENT
Start: 2023-09-14

## 2023-09-14 NOTE — TELEPHONE ENCOUNTER
Frances Carranza is calling to request a refill on the following medication(s):    Medication Request:  Requested Prescriptions     Pending Prescriptions Disp Refills    [address] 200 UNIT/ML SOPN [Pharmacy Med Name: Roselyn Running 200 UNIT/ML]  1     Sig: INJECT 20 UNITS INTO THE SKIN DAILY       Last Visit Date (If Applicable):  2/81/3412    Next Visit Date:    12/15/2023

## 2023-09-24 DIAGNOSIS — K21.9 GASTROESOPHAGEAL REFLUX DISEASE, UNSPECIFIED WHETHER ESOPHAGITIS PRESENT: ICD-10-CM

## 2023-09-24 DIAGNOSIS — E11.65 TYPE 2 DIABETES MELLITUS WITH HYPERGLYCEMIA, WITH LONG-TERM CURRENT USE OF INSULIN (HCC): ICD-10-CM

## 2023-09-24 DIAGNOSIS — Z79.4 TYPE 2 DIABETES MELLITUS WITH HYPERGLYCEMIA, WITH LONG-TERM CURRENT USE OF INSULIN (HCC): ICD-10-CM

## 2023-09-25 RX ORDER — PANTOPRAZOLE SODIUM 40 MG/1
TABLET, DELAYED RELEASE ORAL
Qty: 90 TABLET | Refills: 1 | Status: SHIPPED | OUTPATIENT
Start: 2023-09-25

## 2023-09-25 NOTE — TELEPHONE ENCOUNTER
Jamir Kirby is calling to request a refill on the following medication(s):    Medication Request:  Requested Prescriptions     Pending Prescriptions Disp Refills    Continuous Blood Gluc Sensor (FREESTYLE VENECIA 2 SENSOR) MISC [Pharmacy Med Name: Robert Vaughn 2 SENSOR]  1     Sig: USE AS DIRECTED TO MONITOR BLOOD SUGAR    pantoprazole (PROTONIX) 40 MG tablet [Pharmacy Med Name: PANTOPRAZOLE SOD DR 40 MG TAB] 90 tablet 1     Sig: TAKE 1 TABLET BY MOUTH EVERY DAY       Last Visit Date (If Applicable):  2/74/9341    Next Visit Date:    12/15/2023

## 2023-10-08 DIAGNOSIS — R07.89 LEFT-SIDED CHEST WALL PAIN: ICD-10-CM

## 2023-10-09 RX ORDER — TIZANIDINE 4 MG/1
4 TABLET ORAL NIGHTLY PRN
Qty: 10 TABLET | Refills: 1 | Status: SHIPPED | OUTPATIENT
Start: 2023-10-09

## 2023-10-09 NOTE — TELEPHONE ENCOUNTER
Keny Fry is calling to request a refill on the following medication(s):    Medication Request:  Requested Prescriptions     Pending Prescriptions Disp Refills    tiZANidine (ZANAFLEX) 4 MG tablet [Pharmacy Med Name: TIZANIDINE HCL 4 MG TABLET] 10 tablet 1     Sig: TAKE 1 TABLET BY MOUTH NIGHTLY AS NEEDED (MUSCLE SPASM).        Last Visit Date (If Applicable):  3/38/8048    Next Visit Date:    12/15/2023

## 2023-10-27 DIAGNOSIS — E11.65 TYPE 2 DIABETES MELLITUS WITH HYPERGLYCEMIA, WITH LONG-TERM CURRENT USE OF INSULIN (HCC): ICD-10-CM

## 2023-10-27 DIAGNOSIS — Z79.4 TYPE 2 DIABETES MELLITUS WITH HYPERGLYCEMIA, WITH LONG-TERM CURRENT USE OF INSULIN (HCC): ICD-10-CM

## 2023-10-27 DIAGNOSIS — E11.65 TYPE 2 DIABETES MELLITUS WITH HYPERGLYCEMIA (HCC): ICD-10-CM

## 2023-10-27 RX ORDER — PEN NEEDLE, DIABETIC 32GX 5/32"
NEEDLE, DISPOSABLE MISCELLANEOUS
Qty: 100 EACH | Refills: 3 | Status: SHIPPED | OUTPATIENT
Start: 2023-10-27 | End: 2023-10-27

## 2023-10-27 NOTE — TELEPHONE ENCOUNTER
Vannessa Riggs is calling to request a refill on the following medication(s):    Medication Request:  Requested Prescriptions     Pending Prescriptions Disp Refills    Insulin Pen Needle 32G X 4 MM MISC 100 each 3     Si each by Does not apply route daily    Continuous Blood Gluc Sensor (FREESTYLE VENECIA 2 SENSOR) MISC 2 each 1     Sig: USE AS DIRECTED TO MONITOR BLOOD SUGAR       Last Visit Date (If Applicable):      Next Visit Date:    12/15/2023

## 2023-10-27 NOTE — TELEPHONE ENCOUNTER
Keny Fry is calling to request a refill on the following medication(s):    Medication Request:  Requested Prescriptions     Pending Prescriptions Disp Refills    BD PEN NEEDLE ROSALINO 2ND GEN 32G X 4 MM MISC [Pharmacy Med Name: BD ROSALINO 2 GEN PEN NDL 32G 4MM]  3     Sig: USE AS DIRECTED       Last Visit Date (If Applicable):  1/82/4065    Next Visit Date:    10/27/2023

## 2023-11-09 DIAGNOSIS — E11.65 TYPE 2 DIABETES MELLITUS WITH HYPERGLYCEMIA, WITH LONG-TERM CURRENT USE OF INSULIN (HCC): ICD-10-CM

## 2023-11-09 DIAGNOSIS — Z79.4 TYPE 2 DIABETES MELLITUS WITH HYPERGLYCEMIA, WITH LONG-TERM CURRENT USE OF INSULIN (HCC): ICD-10-CM

## 2023-11-09 RX ORDER — DULAGLUTIDE 3 MG/.5ML
INJECTION, SOLUTION SUBCUTANEOUS
Qty: 2 ADJUSTABLE DOSE PRE-FILLED PEN SYRINGE | Refills: 1 | Status: SHIPPED | OUTPATIENT
Start: 2023-11-09

## 2023-11-09 NOTE — TELEPHONE ENCOUNTER
Vlad aRngel is calling to request a refill on the following medication(s):    Medication Request:  Requested Prescriptions     Pending Prescriptions Disp Refills    TRFlatter World 3 HN/8.0VT SOPN [Pharmacy Med Name: TRULICITY 3 QV/4.6 ML PEN]  1     Sig: INJECT 3 MG INTO THE SKIN ONE TIME PER WEEK       Last Visit Date (If Applicable):  9/86/3793    Next Visit Date:    12/15/2023

## 2023-11-10 ENCOUNTER — TELEPHONE (OUTPATIENT)
Dept: GASTROENTEROLOGY | Age: 68
End: 2023-11-10

## 2023-11-10 NOTE — TELEPHONE ENCOUNTER
Patient Vijaya Ron asking for return phone call for scheduling colonoscopy procedure with Dr. Ana Bernstein (referral date: 9/11/23).

## 2023-11-15 DIAGNOSIS — Z12.11 SCREEN FOR COLON CANCER: Primary | ICD-10-CM

## 2023-11-15 DIAGNOSIS — Z86.010 HISTORY OF COLON POLYPS: ICD-10-CM

## 2023-11-15 NOTE — TELEPHONE ENCOUNTER
Called patient and reviewed colon screening questionnaire. Okay to schedule. Scheduled for 1/18/24 STA 9:45 a.m. Reviewed bowel prep instructions with patient over phone and sent via [employer]. Writer thanked and call ended.

## 2023-11-16 RX ORDER — BISACODYL 5 MG/1
TABLET, DELAYED RELEASE ORAL
Qty: 4 TABLET | Refills: 0 | Status: SHIPPED | OUTPATIENT
Start: 2023-11-16

## 2023-11-16 RX ORDER — POLYETHYLENE GLYCOL 3350 17 G/17G
POWDER, FOR SOLUTION ORAL
Qty: 238 G | Refills: 0 | Status: SHIPPED | OUTPATIENT
Start: 2023-11-16

## 2023-11-19 DIAGNOSIS — Z79.4 TYPE 2 DIABETES MELLITUS WITH HYPERGLYCEMIA, WITH LONG-TERM CURRENT USE OF INSULIN (HCC): ICD-10-CM

## 2023-11-19 DIAGNOSIS — R07.89 LEFT-SIDED CHEST WALL PAIN: ICD-10-CM

## 2023-11-19 DIAGNOSIS — E11.65 TYPE 2 DIABETES MELLITUS WITH HYPERGLYCEMIA, WITH LONG-TERM CURRENT USE OF INSULIN (HCC): ICD-10-CM

## 2023-11-20 DIAGNOSIS — Z79.4 TYPE 2 DIABETES MELLITUS WITH HYPERGLYCEMIA, WITH LONG-TERM CURRENT USE OF INSULIN (HCC): ICD-10-CM

## 2023-11-20 DIAGNOSIS — E11.65 TYPE 2 DIABETES MELLITUS WITH HYPERGLYCEMIA, WITH LONG-TERM CURRENT USE OF INSULIN (HCC): ICD-10-CM

## 2023-11-20 RX ORDER — DULAGLUTIDE 3 MG/.5ML
3 INJECTION, SOLUTION SUBCUTANEOUS WEEKLY
Qty: 2 ADJUSTABLE DOSE PRE-FILLED PEN SYRINGE | Refills: 1 | Status: SHIPPED | OUTPATIENT
Start: 2023-11-20

## 2023-11-20 RX ORDER — TIZANIDINE 4 MG/1
4 TABLET ORAL NIGHTLY PRN
Qty: 10 TABLET | Refills: 1 | Status: SHIPPED | OUTPATIENT
Start: 2023-11-20

## 2023-11-20 NOTE — TELEPHONE ENCOUNTER
Vicky Crespo is calling to request a refill on the following medication(s):    Medication Request:  Requested Prescriptions     Pending Prescriptions Disp Refills    tiZANidine (ZANAFLEX) 4 MG tablet 10 tablet 1     Sig: Take 1 tablet by mouth nightly as needed (muscle spasm)    Continuous Blood Gluc Sensor (FREESTYLE VENECIA 2 SENSOR) MISC 2 each 1     Sig: USE AS DIRECTED TO MONITOR BLOOD SUGAR    Dulaglutide (TRULICITY) 3 AC/7.8XD SOPN 2 Adjustable Dose Pre-filled Pen Syringe 1       Last Visit Date (If Applicable):  6/94/2844    Next Visit Date:    12/15/2023

## 2023-11-20 NOTE — TELEPHONE ENCOUNTER
Directions for the Freestyle can't say use asa directed.  It has to be a specific in order for insurance to pay for it

## 2023-11-22 DIAGNOSIS — Z79.4 TYPE 2 DIABETES MELLITUS WITH HYPERGLYCEMIA, WITH LONG-TERM CURRENT USE OF INSULIN (HCC): ICD-10-CM

## 2023-11-22 DIAGNOSIS — E11.65 TYPE 2 DIABETES MELLITUS WITH HYPERGLYCEMIA, WITH LONG-TERM CURRENT USE OF INSULIN (HCC): ICD-10-CM

## 2023-11-22 NOTE — TELEPHONE ENCOUNTER
Requested Prescriptions     Pending Prescriptions Disp Refills    Continuous Blood Gluc Sensor (FREESTYLE VENECIA 2 SENSOR) MISC 2 each 1     Sig: USE AS DIRECTED TO MONITOR BLOOD SUGAR

## 2023-11-28 DIAGNOSIS — E55.9 VITAMIN D DEFICIENCY: ICD-10-CM

## 2023-11-28 RX ORDER — CHOLECALCIFEROL (VITAMIN D3) 125 MCG
1 CAPSULE ORAL DAILY
Qty: 90 TABLET | Refills: 0 | Status: SHIPPED | OUTPATIENT
Start: 2023-11-28 | End: 2024-02-26

## 2023-11-28 NOTE — TELEPHONE ENCOUNTER
Requested Prescriptions     Pending Prescriptions Disp Refills    Cholecalciferol (VITAMIN D3) 50 MCG (2000 UT) TABS 90 tablet 0     Sig: Take 1 tablet by mouth daily

## 2023-12-15 ENCOUNTER — OFFICE VISIT (OUTPATIENT)
Dept: FAMILY MEDICINE CLINIC | Age: 68
End: 2023-12-15

## 2023-12-15 VITALS
HEART RATE: 79 BPM | HEIGHT: 66 IN | DIASTOLIC BLOOD PRESSURE: 80 MMHG | OXYGEN SATURATION: 94 % | WEIGHT: 216 LBS | BODY MASS INDEX: 34.72 KG/M2 | SYSTOLIC BLOOD PRESSURE: 114 MMHG

## 2023-12-15 DIAGNOSIS — Z79.4 TYPE 2 DIABETES MELLITUS WITH HYPERGLYCEMIA, WITH LONG-TERM CURRENT USE OF INSULIN (HCC): Primary | ICD-10-CM

## 2023-12-15 DIAGNOSIS — I10 ESSENTIAL HYPERTENSION: ICD-10-CM

## 2023-12-15 DIAGNOSIS — E11.65 TYPE 2 DIABETES MELLITUS WITH HYPERGLYCEMIA, WITH LONG-TERM CURRENT USE OF INSULIN (HCC): Primary | ICD-10-CM

## 2023-12-15 LAB — HBA1C MFR BLD: 6.2 %

## 2023-12-15 SDOH — ECONOMIC STABILITY: FOOD INSECURITY: WITHIN THE PAST 12 MONTHS, THE FOOD YOU BOUGHT JUST DIDN'T LAST AND YOU DIDN'T HAVE MONEY TO GET MORE.: NEVER TRUE

## 2023-12-15 SDOH — ECONOMIC STABILITY: FOOD INSECURITY: WITHIN THE PAST 12 MONTHS, YOU WORRIED THAT YOUR FOOD WOULD RUN OUT BEFORE YOU GOT MONEY TO BUY MORE.: NEVER TRUE

## 2023-12-15 SDOH — ECONOMIC STABILITY: INCOME INSECURITY: HOW HARD IS IT FOR YOU TO PAY FOR THE VERY BASICS LIKE FOOD, HOUSING, MEDICAL CARE, AND HEATING?: NOT HARD AT ALL

## 2023-12-15 ASSESSMENT — PATIENT HEALTH QUESTIONNAIRE - PHQ9
SUM OF ALL RESPONSES TO PHQ9 QUESTIONS 1 & 2: 0
SUM OF ALL RESPONSES TO PHQ QUESTIONS 1-9: 0
1. LITTLE INTEREST OR PLEASURE IN DOING THINGS: 0
SUM OF ALL RESPONSES TO PHQ QUESTIONS 1-9: 0
2. FEELING DOWN, DEPRESSED OR HOPELESS: 0

## 2023-12-15 NOTE — PROGRESS NOTES
Depression Screen  09/11/2024    Annual Wellness Visit (AWV)  09/11/2024    A1C test (Diabetic or Prediabetic)  12/15/2024    DTaP/Tdap/Td vaccine (2 - Td or Tdap) 08/20/2030    DEXA (modify frequency per FRAX score)  Completed    Flu vaccine  Completed    Pneumococcal 65+ years Vaccine  Completed    Hepatitis C screen  Completed    Hepatitis A vaccine  Aged Out    Hib vaccine  Aged Out    Meningococcal (ACWY) vaccine  Aged Out    Pneumococcal 0-64 years Vaccine  Discontinued    HIV screen  Discontinued    Cervical cancer screen  Discontinued       Subjective:     Review of Systems   Constitutional:  Negative for appetite change, fatigue and fever. HENT:  Negative for congestion, ear pain, hearing loss and sore throat. Eyes:  Negative for discharge and visual disturbance. Respiratory:  Negative for cough, chest tightness, shortness of breath and wheezing. Cardiovascular:  Negative for chest pain, palpitations and leg swelling. Gastrointestinal:  Negative for abdominal pain, constipation, diarrhea, nausea and vomiting. Genitourinary:  Negative for flank pain, frequency, hematuria and urgency. Musculoskeletal:  Negative for arthralgias, gait problem, joint swelling and myalgias. Skin: Negative. Neurological:  Negative for dizziness, weakness, numbness and headaches. Psychiatric/Behavioral: Negative. Negative for dysphoric mood. The patient is not nervous/anxious. Objective:     Physical Exam  Vitals reviewed. Constitutional:       Appearance: Normal appearance. She is obese. HENT:      Head: Normocephalic and atraumatic. Nose: Nose normal.      Mouth/Throat:      Mouth: Mucous membranes are moist.      Pharynx: Oropharynx is clear. Eyes:      Extraocular Movements: Extraocular movements intact. Conjunctiva/sclera: Conjunctivae normal.      Pupils: Pupils are equal, round, and reactive to light. Cardiovascular:      Rate and Rhythm: Normal rate and regular rhythm.

## 2024-01-14 DIAGNOSIS — E55.9 VITAMIN D DEFICIENCY: ICD-10-CM

## 2024-01-15 RX ORDER — CHOLECALCIFEROL (VITAMIN D3) 50 MCG
2000 TABLET ORAL DAILY
Qty: 90 TABLET | Refills: 0 | Status: SHIPPED | OUTPATIENT
Start: 2024-01-15

## 2024-01-15 NOTE — TELEPHONE ENCOUNTER
Love Rosa is calling to request a refill on the following medication(s):    Medication Request:  Requested Prescriptions     Pending Prescriptions Disp Refills    vitamin D (CHOLECALCIFEROL) 50 MCG (2000 UT) TABS tablet [Pharmacy Med Name: VITAMIN D3 50 MCG TABLET] 90 tablet 0     Sig: TAKE 1 TABLET BY MOUTH EVERY DAY       Last Visit Date (If Applicable):  12/15/2023    Next Visit Date:    3/20/2024

## 2024-01-18 ENCOUNTER — HOSPITAL ENCOUNTER (OUTPATIENT)
Age: 69
Setting detail: OUTPATIENT SURGERY
Discharge: HOME | End: 2024-01-18
Attending: INTERNAL MEDICINE | Admitting: INTERNAL MEDICINE
Payer: MEDICARE

## 2024-01-18 ENCOUNTER — ANESTHESIA (OUTPATIENT)
Dept: OPERATING ROOM | Age: 69
End: 2024-01-18
Payer: MEDICARE

## 2024-01-18 ENCOUNTER — ANESTHESIA EVENT (OUTPATIENT)
Dept: OPERATING ROOM | Age: 69
End: 2024-01-18
Payer: MEDICARE

## 2024-01-18 VITALS
HEART RATE: 77 BPM | OXYGEN SATURATION: 95 % | RESPIRATION RATE: 15 BRPM | HEIGHT: 66 IN | DIASTOLIC BLOOD PRESSURE: 70 MMHG | WEIGHT: 214 LBS | BODY MASS INDEX: 34.39 KG/M2 | TEMPERATURE: 97.9 F | SYSTOLIC BLOOD PRESSURE: 112 MMHG

## 2024-01-18 DIAGNOSIS — Z86.010 HX OF COLONIC POLYPS: ICD-10-CM

## 2024-01-18 DIAGNOSIS — Z12.11 SCREENING FOR COLON CANCER: ICD-10-CM

## 2024-01-18 LAB — GLUCOSE BLD-MCNC: 132 MG/DL (ref 65–105)

## 2024-01-18 PROCEDURE — 3700000001 HC ADD 15 MINUTES (ANESTHESIA): Performed by: INTERNAL MEDICINE

## 2024-01-18 PROCEDURE — 88305 TISSUE EXAM BY PATHOLOGIST: CPT

## 2024-01-18 PROCEDURE — 2709999900 HC NON-CHARGEABLE SUPPLY: Performed by: INTERNAL MEDICINE

## 2024-01-18 PROCEDURE — 7100000010 HC PHASE II RECOVERY - FIRST 15 MIN: Performed by: INTERNAL MEDICINE

## 2024-01-18 PROCEDURE — 6360000002 HC RX W HCPCS: Performed by: NURSE ANESTHETIST, CERTIFIED REGISTERED

## 2024-01-18 PROCEDURE — 2500000003 HC RX 250 WO HCPCS: Performed by: NURSE ANESTHETIST, CERTIFIED REGISTERED

## 2024-01-18 PROCEDURE — 7100000011 HC PHASE II RECOVERY - ADDTL 15 MIN: Performed by: INTERNAL MEDICINE

## 2024-01-18 PROCEDURE — 3609010600 HC COLONOSCOPY POLYPECTOMY SNARE/COLD BIOPSY: Performed by: INTERNAL MEDICINE

## 2024-01-18 PROCEDURE — 82947 ASSAY GLUCOSE BLOOD QUANT: CPT

## 2024-01-18 PROCEDURE — 3700000000 HC ANESTHESIA ATTENDED CARE: Performed by: INTERNAL MEDICINE

## 2024-01-18 RX ORDER — SODIUM CHLORIDE 9 MG/ML
INJECTION, SOLUTION INTRAVENOUS CONTINUOUS
Status: DISCONTINUED | OUTPATIENT
Start: 2024-01-18 | End: 2024-01-18 | Stop reason: HOSPADM

## 2024-01-18 RX ORDER — SODIUM CHLORIDE 0.9 % (FLUSH) 0.9 %
5-40 SYRINGE (ML) INJECTION PRN
Status: DISCONTINUED | OUTPATIENT
Start: 2024-01-18 | End: 2024-01-18 | Stop reason: HOSPADM

## 2024-01-18 RX ORDER — SODIUM CHLORIDE 0.9 % (FLUSH) 0.9 %
5-40 SYRINGE (ML) INJECTION EVERY 12 HOURS SCHEDULED
Status: DISCONTINUED | OUTPATIENT
Start: 2024-01-18 | End: 2024-01-18 | Stop reason: HOSPADM

## 2024-01-18 RX ORDER — SODIUM CHLORIDE, SODIUM LACTATE, POTASSIUM CHLORIDE, CALCIUM CHLORIDE 600; 310; 30; 20 MG/100ML; MG/100ML; MG/100ML; MG/100ML
INJECTION, SOLUTION INTRAVENOUS CONTINUOUS
Status: DISCONTINUED | OUTPATIENT
Start: 2024-01-18 | End: 2024-01-18 | Stop reason: HOSPADM

## 2024-01-18 RX ORDER — LIDOCAINE HYDROCHLORIDE 20 MG/ML
INJECTION, SOLUTION EPIDURAL; INFILTRATION; INTRACAUDAL; PERINEURAL PRN
Status: DISCONTINUED | OUTPATIENT
Start: 2024-01-18 | End: 2024-01-18 | Stop reason: SDUPTHER

## 2024-01-18 RX ORDER — LIDOCAINE HYDROCHLORIDE 10 MG/ML
1 INJECTION, SOLUTION EPIDURAL; INFILTRATION; INTRACAUDAL; PERINEURAL
Status: DISCONTINUED | OUTPATIENT
Start: 2024-01-19 | End: 2024-01-18 | Stop reason: HOSPADM

## 2024-01-18 RX ORDER — PROPOFOL 10 MG/ML
INJECTION, EMULSION INTRAVENOUS CONTINUOUS PRN
Status: DISCONTINUED | OUTPATIENT
Start: 2024-01-18 | End: 2024-01-18 | Stop reason: SDUPTHER

## 2024-01-18 RX ORDER — SODIUM CHLORIDE 9 MG/ML
INJECTION, SOLUTION INTRAVENOUS PRN
Status: DISCONTINUED | OUTPATIENT
Start: 2024-01-18 | End: 2024-01-18 | Stop reason: HOSPADM

## 2024-01-18 RX ADMIN — PROPOFOL 140 MCG/KG/MIN: 10 INJECTION, EMULSION INTRAVENOUS at 09:52

## 2024-01-18 RX ADMIN — LIDOCAINE HYDROCHLORIDE 60 MG: 20 INJECTION, SOLUTION EPIDURAL; INFILTRATION; INTRACAUDAL; PERINEURAL at 09:51

## 2024-01-18 ASSESSMENT — PAIN - FUNCTIONAL ASSESSMENT
PAIN_FUNCTIONAL_ASSESSMENT: 0-10
PAIN_FUNCTIONAL_ASSESSMENT: FACE, LEGS, ACTIVITY, CRY, AND CONSOLABILITY (FLACC)

## 2024-01-18 ASSESSMENT — LIFESTYLE VARIABLES: SMOKING_STATUS: 0

## 2024-01-18 ASSESSMENT — ENCOUNTER SYMPTOMS: SHORTNESS OF BREATH: 0

## 2024-01-18 NOTE — ANESTHESIA PRE PROCEDURE
ABGs: No results found for: \"PHART\", \"PO2ART\", \"ZKA2ETH\", \"OYC3WST\", \"BEART\", \"L2GGPUHI\"     Type & Screen (If Applicable):  No results found for: \"LABABO\", \"LABRH\"    Drug/Infectious Status (If Applicable):  Lab Results   Component Value Date/Time    HEPCAB NONREACTIVE 04/27/2021 09:57 AM       COVID-19 Screening (If Applicable):   Lab Results   Component Value Date/Time    COVID19 Not Detected 12/11/2020 04:00 PM    COVID19 Not Detected 10/22/2020 09:00 AM           Anesthesia Evaluation    Airway: Mallampati: I  TM distance: >3 FB   Neck ROM: full  Mouth opening: > = 3 FB   Dental:          Pulmonary:   (+)     sleep apnea:           (-) shortness of breath and not a current smoker                           Cardiovascular:        (-)  angina                Neuro/Psych:               GI/Hepatic/Renal:             Endo/Other:    (+) Diabetes.                 Abdominal:             Vascular:          Other Findings:       Anesthesia Plan      MAC     ASA 3                               Snow Rodriguez MD   1/18/2024

## 2024-01-18 NOTE — DISCHARGE INSTRUCTIONS
Colonoscopy: What to Expect at Home  Your Recovery  Your doctor will talk to you about when you will need your next colonoscopy. The results of your test and your risk for colorectal cancer will help your doctor decide how often you need to be checked.  After the test, you may be bloated or have gas pains. You may need to pass gas. If a biopsy was done or a polyp was removed, you may have streaks of blood in your stool (feces) for a few days.    How can you care for yourself at home?  Activity  Rest as much as you need to after you go home.  You should be able to go back to your usual activities the day after the test.  Diet  Follow your doctor’s directions for eating.  Drink plenty of fluids (unless your doctor has told you not to) to replace the fluids that were lost during the colon prep.  Medicines  If polyps were removed or a biopsy was done during the test, your doctor may tell you not to take aspirin or other anti-inflammatory medicines, such as ibuprofen (Advil, Motrin) and naproxen (Aleve), for a few days.  Follow-up care is a key part of your treatment and safety. Be sure to make and go to all appointments, and call your doctor if you are having problems.  When should you call for help?  Call 911 anytime you think you may need emergency care. For example, call if:  You passed out (lost consciousness).  You pass maroon or bloody stools.  You have severe belly pain.  Call your doctor now or seek immediate medical care if:  Your stools are black and tarlike.  Your stools have streaks of blood, but you did not have a biopsy or any polyps removed.  You have belly pain, or your belly is swollen and firm.  You vomit.  You have a fever.  You are very dizzy.  Watch closely for changes in your health, and be sure to contact your doctor if you have any problems.   Where can you learn more?   Go to [email or web address] and sign in to your [employer] account. Enter [id number] in the Search [employer]

## 2024-01-18 NOTE — PROGRESS NOTES
Spoke with Christine, pt's sister on the phone to verify she will check in on pt today and lives close by. Discharge instructions reviewed with pt's sister Christine. Dr. Rodriguez notified that pt does not have family present with her here but her sister will be checking in on her later. States he is okay with pt going home via medical transport and sister checking in on her later.

## 2024-01-18 NOTE — ANESTHESIA POSTPROCEDURE EVALUATION
Department of Anesthesiology  Postprocedure Note    Patient: Love Rosa  MRN: [id number]  YOB: 1955  Date of evaluation: 1/18/2024    Procedure Summary       Date: 01/18/24 Room / Location: [address]    Anesthesia Start: 0949 Anesthesia Stop: 1022    Procedure: COLONOSCOPY POLYPECTOMY SNARE/COLD BIOPSY Diagnosis:       Screening for colon cancer      Hx of colonic polyps      (Screening for colon cancer [Z12.11])      (Hx of colonic polyps [Z86.010])    Surgeons: Paola Duval MD Responsible Provider: Snow Rodriguez MD    Anesthesia Type: MAC ASA Status: 3            Anesthesia Type: No value filed.    Ronak Phase I:      Ronak Phase II: Ronak Score: 10    Anesthesia Post Evaluation    Cardiovascular status: hemodynamically stable    No notable events documented.

## 2024-01-18 NOTE — OP NOTE
PROCEDURE NOTE    DATE OF PROCEDURE: 1/18/2024    SURGEON: Paola Duval MD    ASSISTANT: None    PREOPERATIVE DIAGNOSIS: HX OF COLON POLYPS  SCREENING    POSTOPERATIVE DIAGNOSIS: as described below    OPERATION: Total colonoscopy   COLD BIOPSY POLYPECTOMY  ANESTHESIA: MAC PER ANESTHESIA     ESTIMATED BLOOD LOSS: less than 50     COMPLICATIONS: None.     SPECIMENS:  Was Obtained:     HISTORY: The patient is a 68 y.o. year old female with history of above preop diagnosis.  I recommended colonoscopy with possible biopsy or polypectomy and I explained the risk, benefits, expected outcome, and alternatives to the procedure.  Risks included but are not limited to bleeding, infection, respiratory distress, hypotension, and perforation of the colon and possibility of missing a lesion.  The patient understands and is in agreement.      PROCEDURE: The patient was given IV conscious sedation.  The patient's SPO2 remained above 90% throughout the procedure. The colonoscope was inserted per rectum and advanced under direct vision to the cecum without difficulty.  The prep was good.    REDUNDANT COLON   PRESSURES WERE APPLIED TO REACH CECUM  Findings:  Terminal ileum: normal    Cecum/Ascending colon: normal    Transverse colon: abnormal: A 4 MM POLYP WAS EXCISED WITH JUMBO BIOPSY FORCEPS    Descending/Sigmoid colon: abnormal: SCATTERED DIVRTICULOSIS    Rectum/Anus: examined in normal and retroflexed positions and was abnormal: INT HEMORRHOIDS WITH SKIN TAGS    Withdrawal Time was (minutes): 13    The colon was decompressed and the scope was removed.  The patient tolerated the procedure well.     Recommendations/Plan:   Lifestyle and dietary modifications as discussed  F/U Biopsies  F/U In Office in 3-4 weeks  Discussed with the family  Colonoscopy Recall :5 year  POST SEDATION PATIENT WAS STABLE WITH STABLE VITAL SIGNS AND OXYGEN SATURATIONS AND WAS DISCHARGED HOME WITH RIDE IN A STABLE CONDITION.    Electronically signed by

## 2024-01-18 NOTE — H&P
24 hour(s)).    No results for input(s): \"HGB\", \"HCT\", \"WBC\", \"MCV\", \"PLATELET\", \"NA\", \"K\", \"CL\", \"CO2\", \"BUN\", \"CREATININE\", \"GLUCOSE\", \"INR\", \"PROTIME\", \"APTT\", \"AST\", \"ALT\", \"LABALBU\", \"HCG\" in the last 720 hours.    No results for input(s): \"COVID19\" in the last 720 hours.  Imaging/Diagnostics:    No results found.      Diagnosis:      Screening for colon cancer; Hx of colonic polyps    Plans:     1. COLORECTAL CANCER SCREENING, NOT HIGH RISK      Nidia Isaac, HORACE - CNP  1/18/2024  9:04 AM

## 2024-01-19 LAB
GLUCOSE BLD-MCNC: 95 MG/DL (ref 65–105)
SURGICAL PATHOLOGY REPORT: NORMAL

## 2024-01-30 ENCOUNTER — TELEPHONE (OUTPATIENT)
Dept: FAMILY MEDICINE CLINIC | Age: 69
End: 2024-01-30

## 2024-01-30 ENCOUNTER — TELEPHONE (OUTPATIENT)
Dept: GASTROENTEROLOGY | Age: 69
End: 2024-01-30

## 2024-01-30 DIAGNOSIS — R92.8 ABNORMAL MAMMOGRAM OF LEFT BREAST: Primary | ICD-10-CM

## 2024-01-30 NOTE — TELEPHONE ENCOUNTER
Patient called to schedule a 6 month follow up mammogram, but does not have an order. Can you place an order for a diagnostic mammogram.

## 2024-01-31 NOTE — TELEPHONE ENCOUNTER
Reviewed pt Op note and biopsy results, called pt discussed colonoscopy and biopsy results, also discussed internal hemorrhoids with pt, pt thanked writer call ended.

## 2024-02-16 ENCOUNTER — APPOINTMENT (OUTPATIENT)
Dept: CT IMAGING | Age: 69
End: 2024-02-16
Payer: MEDICARE

## 2024-02-16 ENCOUNTER — HOSPITAL ENCOUNTER (EMERGENCY)
Age: 69
Discharge: HOME | End: 2024-02-16
Attending: EMERGENCY MEDICINE
Payer: MEDICARE

## 2024-02-16 VITALS
RESPIRATION RATE: 18 BRPM | TEMPERATURE: 97.7 F | WEIGHT: 212 LBS | OXYGEN SATURATION: 96 % | HEART RATE: 83 BPM | SYSTOLIC BLOOD PRESSURE: 120 MMHG | BODY MASS INDEX: 34.74 KG/M2 | DIASTOLIC BLOOD PRESSURE: 80 MMHG

## 2024-02-16 DIAGNOSIS — K59.00 CONSTIPATION, UNSPECIFIED CONSTIPATION TYPE: ICD-10-CM

## 2024-02-16 DIAGNOSIS — R10.9 UNDIFFERENTIATED ABDOMINAL PAIN: Primary | ICD-10-CM

## 2024-02-16 LAB
ALBUMIN SERPL-MCNC: 4.1 G/DL (ref 3.5–5.2)
ALP SERPL-CCNC: 89 U/L (ref 35–104)
ALT SERPL-CCNC: 12 U/L (ref 5–33)
ANION GAP SERPL CALCULATED.3IONS-SCNC: 12 MMOL/L (ref 9–17)
AST SERPL-CCNC: 17 U/L
BASOPHILS # BLD: 0.06 K/UL (ref 0–0.2)
BASOPHILS NFR BLD: 1 % (ref 0–2)
BILIRUB SERPL-MCNC: 0.5 MG/DL (ref 0.3–1.2)
BUN SERPL-MCNC: 16 MG/DL (ref 8–23)
BUN/CREAT SERPL: 23 (ref 9–20)
CALCIUM SERPL-MCNC: 9.6 MG/DL (ref 8.6–10.4)
CHLORIDE SERPL-SCNC: 100 MMOL/L (ref 98–107)
CO2 SERPL-SCNC: 27 MMOL/L (ref 20–31)
CREAT SERPL-MCNC: 0.7 MG/DL (ref 0.5–0.9)
EOSINOPHIL # BLD: 0.12 K/UL (ref 0–0.44)
EOSINOPHILS RELATIVE PERCENT: 1 % (ref 1–4)
ERYTHROCYTE [DISTWIDTH] IN BLOOD BY AUTOMATED COUNT: 13.1 % (ref 11.8–14.4)
GFR SERPL CREATININE-BSD FRML MDRD: >60 ML/MIN/1.73M2
GLUCOSE SERPL-MCNC: 108 MG/DL (ref 70–99)
HCT VFR BLD AUTO: 43.2 % (ref 36.3–47.1)
HGB BLD-MCNC: 14.6 G/DL (ref 11.9–15.1)
IMM GRANULOCYTES # BLD AUTO: 0.03 K/UL (ref 0–0.3)
IMM GRANULOCYTES NFR BLD: 0 %
LYMPHOCYTES NFR BLD: 2.26 K/UL (ref 1.1–3.7)
LYMPHOCYTES RELATIVE PERCENT: 23 % (ref 24–43)
MCH RBC QN AUTO: 31.1 PG (ref 25.2–33.5)
MCHC RBC AUTO-ENTMCNC: 33.8 G/DL (ref 28.4–34.8)
MCV RBC AUTO: 92.1 FL (ref 82.6–102.9)
MONOCYTES NFR BLD: 0.68 K/UL (ref 0.1–1.2)
MONOCYTES NFR BLD: 7 % (ref 3–12)
NEUTROPHILS NFR BLD: 68 % (ref 36–65)
NEUTS SEG NFR BLD: 6.53 K/UL (ref 1.5–8.1)
NRBC BLD-RTO: 0 PER 100 WBC
PLATELET # BLD AUTO: 235 K/UL (ref 138–453)
PMV BLD AUTO: 9.7 FL (ref 8.1–13.5)
POTASSIUM SERPL-SCNC: 4.6 MMOL/L (ref 3.7–5.3)
PROT SERPL-MCNC: 7 G/DL (ref 6.4–8.3)
RBC # BLD AUTO: 4.69 M/UL (ref 3.95–5.11)
SODIUM SERPL-SCNC: 139 MMOL/L (ref 135–144)
WBC OTHER # BLD: 9.7 K/UL (ref 3.5–11.3)

## 2024-02-16 PROCEDURE — 2580000003 HC RX 258: Performed by: EMERGENCY MEDICINE

## 2024-02-16 PROCEDURE — 74177 CT ABD & PELVIS W/CONTRAST: CPT

## 2024-02-16 PROCEDURE — 96374 THER/PROPH/DIAG INJ IV PUSH: CPT

## 2024-02-16 PROCEDURE — 85025 COMPLETE CBC W/AUTO DIFF WBC: CPT

## 2024-02-16 PROCEDURE — 6360000004 HC RX CONTRAST MEDICATION: Performed by: EMERGENCY MEDICINE

## 2024-02-16 PROCEDURE — 6360000002 HC RX W HCPCS: Performed by: EMERGENCY MEDICINE

## 2024-02-16 PROCEDURE — 6370000000 HC RX 637 (ALT 250 FOR IP): Performed by: EMERGENCY MEDICINE

## 2024-02-16 PROCEDURE — 99285 EMERGENCY DEPT VISIT HI MDM: CPT

## 2024-02-16 PROCEDURE — 80053 COMPREHEN METABOLIC PANEL: CPT

## 2024-02-16 RX ORDER — SODIUM CHLORIDE 0.9 % (FLUSH) 0.9 %
10 SYRINGE (ML) INJECTION PRN
Status: DISCONTINUED | OUTPATIENT
Start: 2024-02-16 | End: 2024-02-16 | Stop reason: HOSPADM

## 2024-02-16 RX ORDER — 0.9 % SODIUM CHLORIDE 0.9 %
1000 INTRAVENOUS SOLUTION INTRAVENOUS ONCE
Status: COMPLETED | OUTPATIENT
Start: 2024-02-16 | End: 2024-02-16

## 2024-02-16 RX ORDER — ENEMA 19; 7 G/133ML; G/133ML
118 ENEMA RECTAL ONCE
Status: COMPLETED | OUTPATIENT
Start: 2024-02-16 | End: 2024-02-16

## 2024-02-16 RX ORDER — ONDANSETRON 2 MG/ML
4 INJECTION INTRAMUSCULAR; INTRAVENOUS ONCE
Status: COMPLETED | OUTPATIENT
Start: 2024-02-16 | End: 2024-02-16

## 2024-02-16 RX ORDER — DOCUSATE SODIUM 100 MG/1
100 CAPSULE, LIQUID FILLED ORAL 2 TIMES DAILY
Qty: 60 CAPSULE | Refills: 0 | Status: SHIPPED | OUTPATIENT
Start: 2024-02-16 | End: 2024-03-17

## 2024-02-16 RX ORDER — 0.9 % SODIUM CHLORIDE 0.9 %
80 INTRAVENOUS SOLUTION INTRAVENOUS ONCE
Status: DISCONTINUED | OUTPATIENT
Start: 2024-02-16 | End: 2024-02-16 | Stop reason: HOSPADM

## 2024-02-16 RX ADMIN — SODIUM CHLORIDE 1000 ML: 9 INJECTION, SOLUTION INTRAVENOUS at 12:09

## 2024-02-16 RX ADMIN — SODIUM CHLORIDE, PRESERVATIVE FREE 10 ML: 5 INJECTION INTRAVENOUS at 13:05

## 2024-02-16 RX ADMIN — Medication 80 ML: at 13:06

## 2024-02-16 RX ADMIN — ONDANSETRON 4 MG: 2 INJECTION INTRAMUSCULAR; INTRAVENOUS at 12:10

## 2024-02-16 RX ADMIN — IOPAMIDOL 75 ML: 755 INJECTION, SOLUTION INTRAVENOUS at 13:05

## 2024-02-16 RX ADMIN — SODIUM PHOSPHATE, DIBASIC AND SODIUM PHOSPHATE, MONOBASIC 1 ENEMA: 7; 19 ENEMA RECTAL at 14:18

## 2024-02-16 RX ADMIN — SODIUM PHOSPHATE, DIBASIC AND SODIUM PHOSPHATE, MONOBASIC 1 ENEMA: 7; 19 ENEMA RECTAL at 14:19

## 2024-02-16 NOTE — ED PROVIDER NOTES
mouth 2 times daily     Dispense:  60 capsule     Refill:  0    sodium phosphate (FLEET) rectal enema 1 enema    sodium phosphate (FLEET) rectal enema 1 enema     DISCHARGE PRESCRIPTIONS:  New Prescriptions    DOCUSATE SODIUM (COLACE) 100 MG CAPSULE    Take 1 capsule by mouth 2 times daily     PHYSICIAN CONSULTS ORDERED THIS ENCOUNTER:  None  FINAL IMPRESSION      1. Undifferentiated abdominal pain    2. Constipation, unspecified constipation type          DISPOSITION/PLAN   DISPOSITION Decision To Discharge 02/16/2024 02:07:15 PM      OUTPATIENT FOLLOW UP THE PATIENT:  Anuja Justin MD  [address] [place] [address]  [phone]    In 2 days        MD Magdalena Hernandez, Aguilar SHEN MD  02/16/24 1140

## 2024-02-16 NOTE — DISCHARGE INSTRUCTIONS
Try MiraLAX OTC to help with constipation.    Return to this emergency room immediately if your symptoms persist, worsen or if new ones form.    Make sure you follow-up with your primary care doctor and your GI within the next 1-2 business days.

## 2024-02-19 ENCOUNTER — TELEPHONE (OUTPATIENT)
Dept: FAMILY MEDICINE CLINIC | Age: 69
End: 2024-02-19

## 2024-02-19 ENCOUNTER — E-VISIT (OUTPATIENT)
Dept: PRIMARY CARE CLINIC | Age: 69
End: 2024-02-19
Payer: MEDICARE

## 2024-02-19 DIAGNOSIS — J32.9 SINUSITIS, UNSPECIFIED CHRONICITY, UNSPECIFIED LOCATION: Primary | ICD-10-CM

## 2024-02-19 DIAGNOSIS — J06.9 UPPER RESPIRATORY TRACT INFECTION, UNSPECIFIED TYPE: Primary | ICD-10-CM

## 2024-02-19 PROCEDURE — 99422 OL DIG E/M SVC 11-20 MIN: CPT | Performed by: NURSE PRACTITIONER

## 2024-02-19 RX ORDER — AZITHROMYCIN 250 MG/1
TABLET, FILM COATED ORAL
Qty: 6 TABLET | Refills: 0 | Status: SHIPPED | OUTPATIENT
Start: 2024-02-19 | End: 2024-02-29

## 2024-02-19 ASSESSMENT — LIFESTYLE VARIABLES
PACKS_PER_DAY: 1
SMOKING_STATUS: NO, I'M A FORMER SMOKER
SMOKING_YEARS: 30

## 2024-02-20 DIAGNOSIS — I10 ESSENTIAL HYPERTENSION: ICD-10-CM

## 2024-02-21 RX ORDER — ATENOLOL 50 MG/1
TABLET ORAL
Qty: 90 TABLET | Refills: 1 | Status: SHIPPED | OUTPATIENT
Start: 2024-02-21

## 2024-02-21 RX ORDER — AMLODIPINE BESYLATE 5 MG/1
TABLET ORAL
Qty: 90 TABLET | Refills: 1 | Status: SHIPPED | OUTPATIENT
Start: 2024-02-21

## 2024-02-21 NOTE — TELEPHONE ENCOUNTER
Love Rosa is calling to request a refill on the following medication(s):    Medication Request:  Requested Prescriptions     Pending Prescriptions Disp Refills    amLODIPine (NORVASC) 5 MG tablet [Pharmacy Med Name: AMLODIPINE BESYLATE 5 MG TAB] 90 tablet 1     Sig: TAKE 1 TABLET BY MOUTH EVERY DAY    atenolol (TENORMIN) 50 MG tablet [Pharmacy Med Name: ATENOLOL 50 MG TABLET] 90 tablet 1     Sig: TAKE 1 TABLET BY MOUTH EVERY DAY       Last Visit Date (If Applicable):  12/15/2023    Next Visit Date:    3/20/2024

## 2024-02-27 ENCOUNTER — HOSPITAL ENCOUNTER (OUTPATIENT)
Dept: MAMMOGRAPHY | Age: 69
Discharge: HOME | End: 2024-02-29
Payer: MEDICARE

## 2024-02-27 DIAGNOSIS — R92.8 ABNORMAL MAMMOGRAM OF LEFT BREAST: ICD-10-CM

## 2024-02-27 PROCEDURE — G0279 TOMOSYNTHESIS, MAMMO: HCPCS

## 2024-03-10 DIAGNOSIS — E11.65 TYPE 2 DIABETES MELLITUS WITH HYPERGLYCEMIA, WITH LONG-TERM CURRENT USE OF INSULIN (HCC): ICD-10-CM

## 2024-03-10 DIAGNOSIS — Z79.4 TYPE 2 DIABETES MELLITUS WITH HYPERGLYCEMIA, WITH LONG-TERM CURRENT USE OF INSULIN (HCC): ICD-10-CM

## 2024-03-10 DIAGNOSIS — R07.89 LEFT-SIDED CHEST WALL PAIN: ICD-10-CM

## 2024-03-10 DIAGNOSIS — K21.9 GASTROESOPHAGEAL REFLUX DISEASE, UNSPECIFIED WHETHER ESOPHAGITIS PRESENT: ICD-10-CM

## 2024-03-11 RX ORDER — DULAGLUTIDE 3 MG/.5ML
INJECTION, SOLUTION SUBCUTANEOUS
Qty: 2 ADJUSTABLE DOSE PRE-FILLED PEN SYRINGE | Refills: 1 | Status: SHIPPED | OUTPATIENT
Start: 2024-03-11

## 2024-03-11 RX ORDER — PANTOPRAZOLE SODIUM 40 MG/1
TABLET, DELAYED RELEASE ORAL
Qty: 90 TABLET | Refills: 1 | Status: SHIPPED | OUTPATIENT
Start: 2024-03-11

## 2024-03-11 RX ORDER — DULAGLUTIDE 3 MG/.5ML
3 INJECTION, SOLUTION SUBCUTANEOUS WEEKLY
Qty: 2 ADJUSTABLE DOSE PRE-FILLED PEN SYRINGE | Refills: 1 | Status: SHIPPED | OUTPATIENT
Start: 2024-03-11

## 2024-03-11 RX ORDER — TIZANIDINE 4 MG/1
4 TABLET ORAL NIGHTLY PRN
Qty: 10 TABLET | Refills: 1 | Status: SHIPPED | OUTPATIENT
Start: 2024-03-11

## 2024-03-11 NOTE — TELEPHONE ENCOUNTER
Love Rosa is calling to request a refill on the following medication(s):    Medication Request:  Requested Prescriptions     Pending Prescriptions Disp Refills    TRULICITY 3 MG/0.5ML SOPN [Pharmacy Med Name: TRULICITY 3 MG/0.5 ML PEN]  1     Sig: INJECT 3 MG INTO THE SKIN ONE TIME PER WEEK    pantoprazole (PROTONIX) 40 MG tablet [Pharmacy Med Name: PANTOPRAZOLE SOD DR 40 MG TAB] 90 tablet 1     Sig: TAKE 1 TABLET BY MOUTH EVERY DAY       Last Visit Date (If Applicable):  12/15/2023    Next Visit Date:    3/10/2024

## 2024-03-11 NOTE — TELEPHONE ENCOUNTER
Love Rosa is calling to request a refill on the following medication(s):    Medication Request:  Requested Prescriptions     Pending Prescriptions Disp Refills    tiZANidine (ZANAFLEX) 4 MG tablet 10 tablet 1     Sig: Take 1 tablet by mouth nightly as needed (muscle spasm)    Dulaglutide (TRULICITY) 3 MG/0.5ML SOPN 2 Adjustable Dose Pre-filled Pen Syringe 1     Sig: Inject 3 mg into the skin once a week       Last Visit Date (If Applicable):  12/15/2023    Next Visit Date:    3/20/2024

## 2024-03-13 ENCOUNTER — TELEPHONE (OUTPATIENT)
Dept: ONCOLOGY | Age: 69
End: 2024-03-13

## 2024-03-13 DIAGNOSIS — Z87.891 PERSONAL HISTORY OF NICOTINE DEPENDENCE: Primary | ICD-10-CM

## 2024-03-13 NOTE — TELEPHONE ENCOUNTER
Our records indicate that your patient is coming due for their annual lung cancer screening follow up testing.     For your convenience, we have pended the order for the scan for you. If you do not agree with the need for the test, please cancel the order and let us know.     Sincerely,    [facility]   Lung Cancer Screening Program    Auto printed reminder letter sent to patient.

## 2024-03-17 SDOH — HEALTH STABILITY: PHYSICAL HEALTH: ON AVERAGE, HOW MANY DAYS PER WEEK DO YOU ENGAGE IN MODERATE TO STRENUOUS EXERCISE (LIKE A BRISK WALK)?: 3 DAYS

## 2024-03-17 SDOH — HEALTH STABILITY: PHYSICAL HEALTH: ON AVERAGE, HOW MANY MINUTES DO YOU ENGAGE IN EXERCISE AT THIS LEVEL?: 20 MIN

## 2024-03-17 ASSESSMENT — PATIENT HEALTH QUESTIONNAIRE - PHQ9
SUM OF ALL RESPONSES TO PHQ QUESTIONS 1-9: 1
2. FEELING DOWN, DEPRESSED OR HOPELESS: SEVERAL DAYS
SUM OF ALL RESPONSES TO PHQ QUESTIONS 1-9: 1
1. LITTLE INTEREST OR PLEASURE IN DOING THINGS: NOT AT ALL
SUM OF ALL RESPONSES TO PHQ9 QUESTIONS 1 & 2: 1
SUM OF ALL RESPONSES TO PHQ QUESTIONS 1-9: 1
SUM OF ALL RESPONSES TO PHQ QUESTIONS 1-9: 1

## 2024-03-17 ASSESSMENT — LIFESTYLE VARIABLES
HOW MANY STANDARD DRINKS CONTAINING ALCOHOL DO YOU HAVE ON A TYPICAL DAY: 0
HOW OFTEN DO YOU HAVE A DRINK CONTAINING ALCOHOL: 1
HOW OFTEN DO YOU HAVE A DRINK CONTAINING ALCOHOL: NEVER
HOW OFTEN DO YOU HAVE SIX OR MORE DRINKS ON ONE OCCASION: 1
HOW MANY STANDARD DRINKS CONTAINING ALCOHOL DO YOU HAVE ON A TYPICAL DAY: PATIENT DOES NOT DRINK

## 2024-03-20 ENCOUNTER — OFFICE VISIT (OUTPATIENT)
Dept: FAMILY MEDICINE CLINIC | Age: 69
End: 2024-03-20

## 2024-03-20 ENCOUNTER — HOSPITAL ENCOUNTER (OUTPATIENT)
Age: 69
Discharge: HOME | End: 2024-03-22
Payer: MEDICARE

## 2024-03-20 ENCOUNTER — HOSPITAL ENCOUNTER (OUTPATIENT)
Dept: GENERAL RADIOLOGY | Age: 69
Discharge: HOME | End: 2024-03-22
Payer: MEDICARE

## 2024-03-20 VITALS
HEART RATE: 79 BPM | SYSTOLIC BLOOD PRESSURE: 134 MMHG | BODY MASS INDEX: 34.23 KG/M2 | WEIGHT: 213 LBS | HEIGHT: 66 IN | DIASTOLIC BLOOD PRESSURE: 82 MMHG | OXYGEN SATURATION: 97 %

## 2024-03-20 DIAGNOSIS — E11.42 DIABETIC POLYNEUROPATHY ASSOCIATED WITH TYPE 2 DIABETES MELLITUS (HCC): ICD-10-CM

## 2024-03-20 DIAGNOSIS — K21.9 GASTROESOPHAGEAL REFLUX DISEASE, UNSPECIFIED WHETHER ESOPHAGITIS PRESENT: ICD-10-CM

## 2024-03-20 DIAGNOSIS — R25.2 MUSCLE CRAMPS: ICD-10-CM

## 2024-03-20 DIAGNOSIS — Z00.00 MEDICARE ANNUAL WELLNESS VISIT, SUBSEQUENT: Primary | ICD-10-CM

## 2024-03-20 DIAGNOSIS — E11.65 TYPE 2 DIABETES MELLITUS WITH HYPERGLYCEMIA, WITH LONG-TERM CURRENT USE OF INSULIN (HCC): ICD-10-CM

## 2024-03-20 DIAGNOSIS — R10.31 RIGHT LOWER QUADRANT ABDOMINAL PAIN: ICD-10-CM

## 2024-03-20 DIAGNOSIS — Z79.4 TYPE 2 DIABETES MELLITUS WITH HYPERGLYCEMIA, WITH LONG-TERM CURRENT USE OF INSULIN (HCC): ICD-10-CM

## 2024-03-20 DIAGNOSIS — I10 ESSENTIAL HYPERTENSION: ICD-10-CM

## 2024-03-20 DIAGNOSIS — E55.9 VITAMIN D DEFICIENCY: ICD-10-CM

## 2024-03-20 DIAGNOSIS — C50.912 MALIGNANT NEOPLASM OF LEFT FEMALE BREAST, UNSPECIFIED ESTROGEN RECEPTOR STATUS, UNSPECIFIED SITE OF BREAST (HCC): ICD-10-CM

## 2024-03-20 LAB — HBA1C MFR BLD: 6 %

## 2024-03-20 PROCEDURE — 74019 RADEX ABDOMEN 2 VIEWS: CPT

## 2024-03-20 RX ORDER — GABAPENTIN 100 MG/1
100 CAPSULE ORAL NIGHTLY
Qty: 30 CAPSULE | Refills: 0 | Status: SHIPPED | OUTPATIENT
Start: 2024-03-20 | End: 2024-04-19

## 2024-03-20 RX ORDER — TIZANIDINE 4 MG/1
4 TABLET ORAL NIGHTLY PRN
Qty: 10 TABLET | Refills: 1 | Status: SHIPPED | OUTPATIENT
Start: 2024-03-20

## 2024-03-20 RX ORDER — DULAGLUTIDE 3 MG/.5ML
INJECTION, SOLUTION SUBCUTANEOUS
Qty: 2 ADJUSTABLE DOSE PRE-FILLED PEN SYRINGE | Refills: 1 | Status: SHIPPED | OUTPATIENT
Start: 2024-03-20

## 2024-03-20 RX ORDER — PANTOPRAZOLE SODIUM 40 MG/1
40 TABLET, DELAYED RELEASE ORAL DAILY
Qty: 90 TABLET | Refills: 1 | Status: SHIPPED | OUTPATIENT
Start: 2024-03-20

## 2024-03-20 RX ORDER — CHOLECALCIFEROL (VITAMIN D3) 50 MCG
2000 TABLET ORAL DAILY
Qty: 90 TABLET | Refills: 0 | Status: SHIPPED | OUTPATIENT
Start: 2024-03-20

## 2024-03-20 RX ORDER — ATENOLOL 50 MG/1
50 TABLET ORAL DAILY
Qty: 90 TABLET | Refills: 1 | Status: SHIPPED | OUTPATIENT
Start: 2024-03-20

## 2024-03-20 RX ORDER — AMLODIPINE BESYLATE 5 MG/1
5 TABLET ORAL DAILY
Qty: 90 TABLET | Refills: 1 | Status: SHIPPED | OUTPATIENT
Start: 2024-03-20

## 2024-03-20 NOTE — PATIENT INSTRUCTIONS

## 2024-03-20 NOTE — PROGRESS NOTES
Medicare Annual Wellness Visit    Love Rosa is here for Medicare AWV    Assessment & Plan   Medicare annual wellness visit, subsequent  Right lower quadrant abdominal pain  -     XR ABDOMEN (2 VIEWS); Future  -     CTA ABDOMEN PELVIS W CONTRAST; Future  -     gabapentin (NEURONTIN) 100 MG capsule; Take 1 capsule by mouth nightly for 30 days. Intended supply: 30 days, Disp-30 capsule, R-0Normal  Essential hypertension  -     amLODIPine (NORVASC) 5 MG tablet; Take 1 tablet by mouth daily, Disp-90 tablet, R-1Normal  -     atenolol (TENORMIN) 50 MG tablet; Take 1 tablet by mouth daily, Disp-90 tablet, R-1Normal  Type 2 diabetes mellitus with hyperglycemia, with long-term current use of insulin (HCC)  -     Dulaglutide (TRULICITY) 3 MG/0.5ML SOPN; INJECT 3 MG INTO THE SKIN ONE TIME PER WEEK, Disp-2 Adjustable Dose Pre-filled Pen Syringe, R-1Normal  -     POCT glycosylated hemoglobin (Hb A1C)  Diabetic polyneuropathy associated with type 2 diabetes mellitus (HCC)  Malignant neoplasm of left female breast, unspecified estrogen receptor status, unspecified site of breast (HCC)  Gastroesophageal reflux disease, unspecified whether esophagitis present  -     pantoprazole (PROTONIX) 40 MG tablet; Take 1 tablet by mouth daily, Disp-90 tablet, R-1Normal  Muscle cramps  -     tiZANidine (ZANAFLEX) 4 MG tablet; Take 1 tablet by mouth nightly as needed (muscle spasm), Disp-10 tablet, R-1Normal  Vitamin D deficiency  -     vitamin D (CHOLECALCIFEROL) 50 MCG (2000 UT) TABS tablet; Take 1 tablet by mouth daily, Disp-90 tablet, R-0Normal    Recommendations for Preventive Services Due: see orders and patient instructions/AVS.  Recommended screening schedule for the next 5-10 years is provided to the patient in written form: see Patient Instructions/AVS.     Return in 3 months (on 6/20/2024) for Follow up DM/HTN.     Subjective   The following acute and/or chronic problems were also addressed today:  Her diabetes is well-controlled

## 2024-03-21 ENCOUNTER — HOSPITAL ENCOUNTER (OUTPATIENT)
Dept: CT IMAGING | Age: 69
Discharge: HOME | End: 2024-03-23
Payer: MEDICARE

## 2024-03-21 DIAGNOSIS — R10.9 ACUTE ABDOMINAL PAIN: ICD-10-CM

## 2024-03-21 DIAGNOSIS — R10.9 ACUTE ABDOMINAL PAIN: Primary | ICD-10-CM

## 2024-03-21 DIAGNOSIS — G89.18 OTHER ACUTE POSTPROCEDURAL PAIN: Primary | ICD-10-CM

## 2024-03-21 LAB — CREAT BLD-MCNC: 0.8 MG/DL (ref 0.6–1.4)

## 2024-03-21 PROCEDURE — 74177 CT ABD & PELVIS W/CONTRAST: CPT

## 2024-03-21 PROCEDURE — 82565 ASSAY OF CREATININE: CPT

## 2024-03-21 PROCEDURE — 2580000003 HC RX 258: Performed by: STUDENT IN AN ORGANIZED HEALTH CARE EDUCATION/TRAINING PROGRAM

## 2024-03-21 PROCEDURE — 6360000004 HC RX CONTRAST MEDICATION: Performed by: STUDENT IN AN ORGANIZED HEALTH CARE EDUCATION/TRAINING PROGRAM

## 2024-03-21 RX ORDER — SODIUM CHLORIDE 0.9 % (FLUSH) 0.9 %
10 SYRINGE (ML) INJECTION PRN
Status: DISCONTINUED | OUTPATIENT
Start: 2024-03-21 | End: 2024-03-24 | Stop reason: HOSPADM

## 2024-03-21 RX ORDER — 0.9 % SODIUM CHLORIDE 0.9 %
80 INTRAVENOUS SOLUTION INTRAVENOUS ONCE
Status: COMPLETED | OUTPATIENT
Start: 2024-03-21 | End: 2024-03-21

## 2024-03-21 RX ADMIN — IOPAMIDOL 75 ML: 755 INJECTION, SOLUTION INTRAVENOUS at 11:16

## 2024-03-21 RX ADMIN — SODIUM CHLORIDE 80 ML: 9 INJECTION, SOLUTION INTRAVENOUS at 11:16

## 2024-03-21 RX ADMIN — SODIUM CHLORIDE, PRESERVATIVE FREE 10 ML: 5 INJECTION INTRAVENOUS at 11:16

## 2024-03-25 ENCOUNTER — OFFICE VISIT (OUTPATIENT)
Dept: SURGERY | Age: 69
End: 2024-03-25
Payer: MEDICARE

## 2024-03-25 VITALS
HEIGHT: 65 IN | BODY MASS INDEX: 35.99 KG/M2 | DIASTOLIC BLOOD PRESSURE: 84 MMHG | RESPIRATION RATE: 16 BRPM | OXYGEN SATURATION: 100 % | HEART RATE: 89 BPM | WEIGHT: 216 LBS | SYSTOLIC BLOOD PRESSURE: 129 MMHG

## 2024-03-25 DIAGNOSIS — R10.9 ABDOMINAL WALL PAIN: Primary | ICD-10-CM

## 2024-03-25 PROCEDURE — 3079F DIAST BP 80-89 MM HG: CPT | Performed by: SURGERY

## 2024-03-25 PROCEDURE — 3074F SYST BP LT 130 MM HG: CPT | Performed by: SURGERY

## 2024-03-25 PROCEDURE — 1123F ACP DISCUSS/DSCN MKR DOCD: CPT | Performed by: SURGERY

## 2024-03-25 PROCEDURE — 99203 OFFICE O/P NEW LOW 30 MIN: CPT | Performed by: SURGERY

## 2024-03-25 NOTE — PROGRESS NOTES
[facility] General Surgery  Clinic Evaluation  lAexi Hidalgo DO    Pt Name: Love Rosa  MRN: [id number]  YOB: 1955  Date of evaluation: 3/25/2024  Primary Care Physician: Anuja Justin MD    Chief Complaint: abdominal wall pain RLQ      SUBJECTIVE:    History of Present Illness:     This is a 68 y.o.  female who presents for evaluation for the above. Pt states that she has had RLQ abdominal wall pain since her colonoscopy done 1/18/2024 by Dr AYLA Duval, denies prior history of this. Denies trauma to this area, denies that she has constipation and no relief of symptoms with bowel movements. Prior history of cholecystectomy done ~20yrs ago, she had suspicion that maybe her cholecystectomy clips had migrated into her abdominal wall causing these symptoms. Pt had CT abd/pelv done 2/16/2024 and 3/21/2024 in workup for this problem. Reports the pain will wake her up at night, otherwise she is able to perform her ADLs without issue.    Chart review performed to add information to the HPI: Yes    Past Medical History   has a past medical history of Breast cancer (HCC), Diabetes mellitus (HCC), Emphysema (subcutaneous) (surgical) resulting from a procedure, History of therapeutic radiation, Hx of blood clots, Hyperlipidemia, Hypertension, Kidney calculi, Neuropathy, DIONI on CPAP, Prolonged emergence from general anesthesia, Sleep apnea, and Type 2 diabetes mellitus without complication (HCC).    Past Surgical History   has a past surgical history that includes other surgical history; Cholecystectomy; Knee arthroscopy (Left, 1995); Tonsillectomy; Colonoscopy; Colonoscopy (10/26/2020); Colonoscopy (N/A, 10/26/2020); HARI STEREO BREAST BX W LOC DEVICE 1ST LESION LEFT (11/10/2020); Endoscopy, colon, diagnostic; US BIOPSY THYROID; Breast biopsy (Left, 12/15/2020); skin biopsy; and Colonoscopy (N/A, 1/18/2024).    Family History  family history includes Alzheimer's Disease in her mother; Breast Cancer in her mother;

## 2024-03-29 ENCOUNTER — TELEPHONE (OUTPATIENT)
Dept: FAMILY MEDICINE CLINIC | Age: 69
End: 2024-03-29

## 2024-03-29 DIAGNOSIS — R10.31 RIGHT LOWER QUADRANT ABDOMINAL PAIN: Primary | ICD-10-CM

## 2024-03-29 RX ORDER — TRAMADOL HYDROCHLORIDE 50 MG/1
50 TABLET ORAL EVERY 4 HOURS PRN
Qty: 18 TABLET | Refills: 0 | Status: SHIPPED | OUTPATIENT
Start: 2024-03-29 | End: 2024-04-01

## 2024-03-29 NOTE — TELEPHONE ENCOUNTER
Patient called and stated she will need a referral to pain management. She also stated she is wondering if you can prescribe something else for pain because the gabapentin isn't working.

## 2024-04-19 ENCOUNTER — OFFICE VISIT (OUTPATIENT)
Dept: PAIN MANAGEMENT | Age: 69
End: 2024-04-19
Payer: MEDICARE

## 2024-04-19 VITALS — WEIGHT: 216 LBS | BODY MASS INDEX: 35.99 KG/M2 | HEIGHT: 65 IN

## 2024-04-19 DIAGNOSIS — E66.01 SEVERE OBESITY (BMI 35.0-39.9) WITH COMORBIDITY (HCC): ICD-10-CM

## 2024-04-19 DIAGNOSIS — R10.9 ABDOMINAL PAIN, UNSPECIFIED ABDOMINAL LOCATION: Primary | ICD-10-CM

## 2024-04-19 DIAGNOSIS — M79.18 MYOFASCIAL PAIN SYNDROME: ICD-10-CM

## 2024-04-19 PROCEDURE — 99204 OFFICE O/P NEW MOD 45 MIN: CPT | Performed by: STUDENT IN AN ORGANIZED HEALTH CARE EDUCATION/TRAINING PROGRAM

## 2024-04-19 PROCEDURE — 1123F ACP DISCUSS/DSCN MKR DOCD: CPT | Performed by: STUDENT IN AN ORGANIZED HEALTH CARE EDUCATION/TRAINING PROGRAM

## 2024-04-19 NOTE — PROGRESS NOTES
Chronic Pain Clinic Note     Encounter Date: 4/19/2024     SUBJECTIVE:  Chief Complaint   Patient presents with    Abdominal Pain       History of Present Illness:   Love Rosa is a 68 y.o. female who presents with abdominal pain    Current Complaints of Pain:   Location: abdominal pain  Radiation: someimes to the back  Severity:  Moderate  Pain Numerical Score - 4 today   Average: 4/5     Highest: 8/9  Lowest: 2/3  Character/Quality: Complains of pain that is burning and sharp  Timing: Constant  Associated symptoms: none  Numbness: no  Weakness: no  Exacerbating factors: pain is just there  Alleviating factors: no  Length of time pain has been present: Started about three months ago  Inciting event/injury: after colonoscopy  Bowel/Bladder incontinence: no  Falls: no  Physical Therapy:      History of Interventions:   Surgery: No previous lumbar/cervical surgeries  Injections: None    Imaging:     CT ABDOMEN 03/21/2024      Past Medical History:   Diagnosis Date    Breast cancer (HCC)     left breast    Diabetes mellitus (HCC)     Emphysema (subcutaneous) (surgical) resulting from a procedure     History of therapeutic radiation     Hx of blood clots     left leg    Hyperlipidemia     Hypertension     Kidney calculi     passed on her own    Neuropathy     DIONI on CPAP     uses nightly    Prolonged emergence from general anesthesia     Sleep apnea     Type 2 diabetes mellitus without complication (HCC)        Past Surgical History:   Procedure Laterality Date    BREAST BIOPSY Left 12/15/2020    LEFT BREAST LUMPECTOMY WITH   NEEDLE LOCALIZATION (11:30) performed by Sunitha Greene DO at [facility] OR    CHOLECYSTECTOMY      COLONOSCOPY      removed polyps    COLONOSCOPY  10/26/2020    with Biopsy    COLONOSCOPY N/A 10/26/2020    COLONOSCOPY POLYPECTOMY  BIOPSY performed by Paola Duval MD at [facility] OR    COLONOSCOPY N/A 1/18/2024    COLONOSCOPY POLYPECTOMY SNARE/COLD BIOPSY performed by Paola Duval MD at [facility] OR

## 2024-05-05 DIAGNOSIS — I25.84 CORONARY ARTERY CALCIFICATION: ICD-10-CM

## 2024-05-05 DIAGNOSIS — E11.65 TYPE 2 DIABETES MELLITUS WITH HYPERGLYCEMIA, WITH LONG-TERM CURRENT USE OF INSULIN (HCC): ICD-10-CM

## 2024-05-05 DIAGNOSIS — Z79.4 TYPE 2 DIABETES MELLITUS WITH HYPERGLYCEMIA, WITH LONG-TERM CURRENT USE OF INSULIN (HCC): ICD-10-CM

## 2024-05-05 DIAGNOSIS — I25.10 CORONARY ARTERY CALCIFICATION: ICD-10-CM

## 2024-05-06 NOTE — TELEPHONE ENCOUNTER
Love Rosa is calling to request a refill on the following medication(s):    Medication Request:  Requested Prescriptions     Pending Prescriptions Disp Refills    rosuvastatin (CRESTOR) 5 MG tablet [Pharmacy Med Name: ROSUVASTATIN CALCIUM 5 MG TAB] 90 tablet 1     Sig: TAKE 1 TABLET BY MOUTH EVERY DAY AT NIGHT    TRESIBA FLEXTOUCH 200 UNIT/ML SOPN [Pharmacy Med Name: TRESIBA FLEXTOUCH 200 UNIT/ML]  1     Sig: INJECT 20 UNITS INTO THE SKIN DAILY       Last Visit Date (If Applicable):  3/20/2024    Next Visit Date:    6/25/2024              f

## 2024-05-07 RX ORDER — ROSUVASTATIN CALCIUM 5 MG/1
5 TABLET, COATED ORAL NIGHTLY
Qty: 90 TABLET | Refills: 1 | Status: SHIPPED | OUTPATIENT
Start: 2024-05-07

## 2024-05-07 RX ORDER — INSULIN DEGLUDEC 200 U/ML
INJECTION, SOLUTION SUBCUTANEOUS
Qty: 9 ADJUSTABLE DOSE PRE-FILLED PEN SYRINGE | Refills: 1 | Status: SHIPPED | OUTPATIENT
Start: 2024-05-07

## 2024-05-10 ENCOUNTER — TELEPHONE (OUTPATIENT)
Dept: FAMILY MEDICINE CLINIC | Age: 69
End: 2024-05-10

## 2024-05-10 NOTE — TELEPHONE ENCOUNTER
Patient called and stated her Trulicity is on backorder and she is wondering what she should do or it you can prescribe something else.

## 2024-05-13 DIAGNOSIS — E11.65 TYPE 2 DIABETES MELLITUS WITH HYPERGLYCEMIA, WITH LONG-TERM CURRENT USE OF INSULIN (HCC): Primary | ICD-10-CM

## 2024-05-13 DIAGNOSIS — Z79.4 TYPE 2 DIABETES MELLITUS WITH HYPERGLYCEMIA, WITH LONG-TERM CURRENT USE OF INSULIN (HCC): Primary | ICD-10-CM

## 2024-05-13 RX ORDER — SEMAGLUTIDE 1.34 MG/ML
1 INJECTION, SOLUTION SUBCUTANEOUS
Qty: 2 ADJUSTABLE DOSE PRE-FILLED PEN SYRINGE | Refills: 1 | Status: SHIPPED | OUTPATIENT
Start: 2024-05-13

## 2024-06-02 ENCOUNTER — E-VISIT (OUTPATIENT)
Dept: PRIMARY CARE CLINIC | Age: 69
End: 2024-06-02
Payer: MEDICARE

## 2024-06-02 DIAGNOSIS — J01.90 ACUTE NON-RECURRENT SINUSITIS, UNSPECIFIED LOCATION: Primary | ICD-10-CM

## 2024-06-02 PROCEDURE — 99422 OL DIG E/M SVC 11-20 MIN: CPT | Performed by: NURSE PRACTITIONER

## 2024-06-02 RX ORDER — AZITHROMYCIN 250 MG/1
TABLET, FILM COATED ORAL
Qty: 1 PACKET | Refills: 0 | Status: SHIPPED | OUTPATIENT
Start: 2024-06-02 | End: 2024-06-12

## 2024-06-02 ASSESSMENT — LIFESTYLE VARIABLES
SMOKING_STATUS: NO, I'M A FORMER SMOKER
SMOKING_YEARS: 27
PACKS_PER_DAY: 2

## 2024-06-03 NOTE — PROGRESS NOTES
Love Rosa (1955) initiated an asynchronous digital communication through [employer].    HPI: per patient questionnaire     Exam: not applicable    Diagnoses and all orders for this visit:  Diagnoses and all orders for this visit:    Acute non-recurrent sinusitis, unspecified location    Other orders  -     azithromycin (ZITHROMAX) 250 MG tablet; 2 tablets now then 1 daily until gone.    Sx x 1 week. Antibiotic sent. Supportive care measures provided. F/u with pcp as needed    Time: EV2 - 11-20 minutes were spent on the digital evaluation and management of this patient. 17 min     Cindy Obregon, APRN - CNP

## 2024-06-07 DIAGNOSIS — E11.65 TYPE 2 DIABETES MELLITUS WITH HYPERGLYCEMIA, WITH LONG-TERM CURRENT USE OF INSULIN (HCC): ICD-10-CM

## 2024-06-07 DIAGNOSIS — Z79.4 TYPE 2 DIABETES MELLITUS WITH HYPERGLYCEMIA, WITH LONG-TERM CURRENT USE OF INSULIN (HCC): ICD-10-CM

## 2024-06-07 DIAGNOSIS — I25.10 CORONARY ARTERY CALCIFICATION: ICD-10-CM

## 2024-06-07 DIAGNOSIS — R25.2 MUSCLE CRAMPS: ICD-10-CM

## 2024-06-07 DIAGNOSIS — I25.84 CORONARY ARTERY CALCIFICATION: ICD-10-CM

## 2024-06-07 NOTE — TELEPHONE ENCOUNTER
Love Rosa is calling to request a refill on the following medication(s):    Medication Request:  Requested Prescriptions     Pending Prescriptions Disp Refills    Continuous Glucose Sensor (FREESTYLE VENECIA 2 SENSOR) MISC [Pharmacy Med Name: FREESTYLE VENECIA 2 SENSOR]  1     Sig: USE AS DIRECTED TO MONITOR BLOOD SUGAR.       Last Visit Date (If Applicable):  3/20/2024    Next Visit Date:    6/7/2024

## 2024-06-07 NOTE — TELEPHONE ENCOUNTER
Love Rosa is calling to request a refill on the following medication(s):    Medication Request:  Requested Prescriptions     Pending Prescriptions Disp Refills    Insulin Pen Needle 32G X 4 MM MISC 100 each 3     Si each by Does not apply route daily    Continuous Glucose Sensor (FREESTYLE VENECIA 2 SENSOR) MISC 2 each 1     Sig: USE AS DIRECTED TO MONITOR BLOOD SUGAR    tiZANidine (ZANAFLEX) 4 MG tablet 10 tablet 1     Sig: Take 1 tablet by mouth nightly as needed (muscle spasm)    Dulaglutide (TRULICITY) 3 MG/0.5ML SOPN 2 Adjustable Dose Pre-filled Pen Syringe 1     Sig: INJECT 3 MG INTO THE SKIN ONE TIME PER WEEK    rosuvastatin (CRESTOR) 5 MG tablet 90 tablet 1     Sig: Take 1 tablet by mouth nightly    Semaglutide, 1 MG/DOSE, (OZEMPIC, 1 MG/DOSE,) 4 MG/3ML SOPN sc injection 2 Adjustable Dose Pre-filled Pen Syringe 1     Sig: Inject 1 mg into the skin every 7 days       Last Visit Date (If Applicable):  3/20/2024    Next Visit Date:    2024

## 2024-06-10 RX ORDER — DULAGLUTIDE 3 MG/.5ML
INJECTION, SOLUTION SUBCUTANEOUS
Qty: 2 ADJUSTABLE DOSE PRE-FILLED PEN SYRINGE | Refills: 1 | Status: SHIPPED | OUTPATIENT
Start: 2024-06-10

## 2024-06-10 RX ORDER — ROSUVASTATIN CALCIUM 5 MG/1
5 TABLET, COATED ORAL NIGHTLY
Qty: 90 TABLET | Refills: 1 | Status: SHIPPED | OUTPATIENT
Start: 2024-06-10

## 2024-06-10 RX ORDER — TIZANIDINE 4 MG/1
4 TABLET ORAL NIGHTLY PRN
Qty: 10 TABLET | Refills: 1 | Status: SHIPPED | OUTPATIENT
Start: 2024-06-10

## 2024-06-10 NOTE — TELEPHONE ENCOUNTER
Love Rosa is calling to request a refill on the following medication(s):    Medication Request:  Requested Prescriptions     Pending Prescriptions Disp Refills    Semaglutide, 1 MG/DOSE, (OZEMPIC, 1 MG/DOSE,) 4 MG/3ML SOPN sc injection 2 Adjustable Dose Pre-filled Pen Syringe 1     Sig: Inject 1 mg into the skin every 7 days     Signed Prescriptions Disp Refills    Insulin Pen Needle 32G X 4 MM MISC 100 each 3     Si each by Does not apply route daily     Authorizing Provider: KATHRYN ASHBY    Continuous Glucose Sensor (FREESTYLE VENECIA 2 SENSOR) MISC 2 each 1     Sig: USE AS DIRECTED TO MONITOR BLOOD SUGAR     Authorizing Provider: KATHRYN ASHBY    tiZANidine (ZANAFLEX) 4 MG tablet 10 tablet 1     Sig: Take 1 tablet by mouth nightly as needed (muscle spasm)     Authorizing Provider: KATHRYN ASHBY    Dulaglutide (TRULICITY) 3 MG/0.5ML SOPN 2 Adjustable Dose Pre-filled Pen Syringe 1     Sig: INJECT 3 MG INTO THE SKIN ONE TIME PER WEEK     Authorizing Provider: KATHRYN ASHBY    rosuvastatin (CRESTOR) 5 MG tablet 90 tablet 1     Sig: Take 1 tablet by mouth nightly     Authorizing Provider: KATHRYN ASHBY       Last Visit Date (If Applicable):  3/20/2024    Next Visit Date:    2024

## 2024-06-12 RX ORDER — SEMAGLUTIDE 1.34 MG/ML
1 INJECTION, SOLUTION SUBCUTANEOUS
Qty: 2 ADJUSTABLE DOSE PRE-FILLED PEN SYRINGE | Refills: 1 | Status: SHIPPED | OUTPATIENT
Start: 2024-06-12

## 2024-06-25 ENCOUNTER — OFFICE VISIT (OUTPATIENT)
Dept: FAMILY MEDICINE CLINIC | Age: 69
End: 2024-06-25
Payer: MEDICARE

## 2024-06-25 VITALS
HEIGHT: 65 IN | TEMPERATURE: 97 F | SYSTOLIC BLOOD PRESSURE: 120 MMHG | WEIGHT: 221 LBS | BODY MASS INDEX: 36.82 KG/M2 | DIASTOLIC BLOOD PRESSURE: 70 MMHG | OXYGEN SATURATION: 98 % | HEART RATE: 75 BPM

## 2024-06-25 DIAGNOSIS — E11.65 TYPE 2 DIABETES MELLITUS WITH HYPERGLYCEMIA, WITH LONG-TERM CURRENT USE OF INSULIN (HCC): Primary | ICD-10-CM

## 2024-06-25 DIAGNOSIS — R10.32 LEFT LOWER QUADRANT ABDOMINAL PAIN: ICD-10-CM

## 2024-06-25 DIAGNOSIS — Z87.891 PERSONAL HISTORY OF TOBACCO USE: ICD-10-CM

## 2024-06-25 DIAGNOSIS — E55.9 VITAMIN D DEFICIENCY: ICD-10-CM

## 2024-06-25 DIAGNOSIS — Z79.4 TYPE 2 DIABETES MELLITUS WITH HYPERGLYCEMIA, WITH LONG-TERM CURRENT USE OF INSULIN (HCC): Primary | ICD-10-CM

## 2024-06-25 DIAGNOSIS — I10 ESSENTIAL HYPERTENSION: ICD-10-CM

## 2024-06-25 LAB — HBA1C MFR BLD: 6.3 %

## 2024-06-25 PROCEDURE — 3074F SYST BP LT 130 MM HG: CPT | Performed by: STUDENT IN AN ORGANIZED HEALTH CARE EDUCATION/TRAINING PROGRAM

## 2024-06-25 PROCEDURE — 99214 OFFICE O/P EST MOD 30 MIN: CPT | Performed by: STUDENT IN AN ORGANIZED HEALTH CARE EDUCATION/TRAINING PROGRAM

## 2024-06-25 PROCEDURE — G0296 VISIT TO DETERM LDCT ELIG: HCPCS | Performed by: STUDENT IN AN ORGANIZED HEALTH CARE EDUCATION/TRAINING PROGRAM

## 2024-06-25 PROCEDURE — 83036 HEMOGLOBIN GLYCOSYLATED A1C: CPT | Performed by: STUDENT IN AN ORGANIZED HEALTH CARE EDUCATION/TRAINING PROGRAM

## 2024-06-25 PROCEDURE — 1123F ACP DISCUSS/DSCN MKR DOCD: CPT | Performed by: STUDENT IN AN ORGANIZED HEALTH CARE EDUCATION/TRAINING PROGRAM

## 2024-06-25 PROCEDURE — 3044F HG A1C LEVEL LT 7.0%: CPT | Performed by: STUDENT IN AN ORGANIZED HEALTH CARE EDUCATION/TRAINING PROGRAM

## 2024-06-25 PROCEDURE — 3078F DIAST BP <80 MM HG: CPT | Performed by: STUDENT IN AN ORGANIZED HEALTH CARE EDUCATION/TRAINING PROGRAM

## 2024-06-25 RX ORDER — SEMAGLUTIDE 2.68 MG/ML
2 INJECTION, SOLUTION SUBCUTANEOUS
Qty: 3 ML | Refills: 1 | Status: SHIPPED | OUTPATIENT
Start: 2024-06-25

## 2024-06-25 SDOH — ECONOMIC STABILITY: INCOME INSECURITY: IN THE LAST 12 MONTHS, WAS THERE A TIME WHEN YOU WERE NOT ABLE TO PAY THE MORTGAGE OR RENT ON TIME?: NO

## 2024-06-25 SDOH — ECONOMIC STABILITY: FOOD INSECURITY: WITHIN THE PAST 12 MONTHS, THE FOOD YOU BOUGHT JUST DIDN'T LAST AND YOU DIDN'T HAVE MONEY TO GET MORE.: NEVER TRUE

## 2024-06-25 SDOH — ECONOMIC STABILITY: FOOD INSECURITY: WITHIN THE PAST 12 MONTHS, YOU WORRIED THAT YOUR FOOD WOULD RUN OUT BEFORE YOU GOT MONEY TO BUY MORE.: NEVER TRUE

## 2024-06-25 ASSESSMENT — SOCIAL DETERMINANTS OF HEALTH (SDOH): HOW HARD IS IT FOR YOU TO PAY FOR THE VERY BASICS LIKE FOOD, HOUSING, MEDICAL CARE, AND HEATING?: NOT HARD AT ALL

## 2024-06-25 ASSESSMENT — ENCOUNTER SYMPTOMS
CHEST TIGHTNESS: 0
CONSTIPATION: 0
COUGH: 0
SORE THROAT: 0
WHEEZING: 0
ABDOMINAL PAIN: 1
NAUSEA: 0
DIARRHEA: 0
VOMITING: 0
EYE DISCHARGE: 0
SHORTNESS OF BREATH: 0

## 2024-06-25 ASSESSMENT — PATIENT HEALTH QUESTIONNAIRE - PHQ9
1. LITTLE INTEREST OR PLEASURE IN DOING THINGS: NOT AT ALL
SUM OF ALL RESPONSES TO PHQ9 QUESTIONS 1 & 2: 0
SUM OF ALL RESPONSES TO PHQ QUESTIONS 1-9: 0
SUM OF ALL RESPONSES TO PHQ QUESTIONS 1-9: 0
2. FEELING DOWN, DEPRESSED OR HOPELESS: NOT AT ALL
SUM OF ALL RESPONSES TO PHQ QUESTIONS 1-9: 0
SUM OF ALL RESPONSES TO PHQ QUESTIONS 1-9: 0

## 2024-06-25 NOTE — PROGRESS NOTES
[facility]  [address]  [address]  [facility] [address]  Dept: [phone]  Dept Fax: [phone]    Love Rosa is a 68 y.o. female who is a Established patient, who presents today for her medical conditions/complaints as noted below:  Chief Complaint   Patient presents with    Diabetes    Hypertension         HPI:     She is here today to follow-up on diabetes and hypertension.  She states that she has not been able to get Trulicity from the pharmacy due to short supply and was taking Ozempic instead.  She feels that her blood sugars have been running higher since being on Ozempic and she is also gained weight.  She states she was doing better on Trulicity.  She also continues to have left lower abdominal pain for past few months.  Her CT abdomen was negative.  She states that she does have history of fibroids previously.  States that the pain comes randomly and is there almost every day.          Hemoglobin A1C (%)   Date Value   06/25/2024 6.3   03/20/2024 6.0   12/15/2023 6.2             ( goal A1Cis < 7)   No components found for: \"LABMICR\"  No components found for: \"LDLCHOLESTEROL\", \"LDLCALC\"    (goal LDL is <100)   AST (U/L)   Date Value   02/16/2024 17     ALT (U/L)   Date Value   02/16/2024 12     BUN (mg/dL)   Date Value   02/16/2024 16     BP Readings from Last 3 Encounters:   06/25/24 120/70   03/25/24 129/84   03/20/24 134/82          (goal 120/80)    Past Medical History:   Diagnosis Date    Breast cancer (HCC)     left breast    Diabetes mellitus (HCC)     Emphysema (subcutaneous) (surgical) resulting from a procedure     History of therapeutic radiation     Hx of blood clots     left leg    Hyperlipidemia     Hypertension     Kidney calculi     passed on her own    Neuropathy     DIONI on CPAP     uses nightly    Prolonged emergence from general anesthesia     Sleep apnea     Type 2 diabetes mellitus without complication (HCC)       Past Surgical History:

## 2024-07-17 ENCOUNTER — HOSPITAL ENCOUNTER (OUTPATIENT)
Dept: ULTRASOUND IMAGING | Age: 69
Discharge: HOME | End: 2024-07-19
Payer: MEDICARE

## 2024-07-17 ENCOUNTER — HOSPITAL ENCOUNTER (OUTPATIENT)
Dept: CT IMAGING | Age: 69
Discharge: HOME | End: 2024-07-19
Payer: MEDICARE

## 2024-07-17 DIAGNOSIS — R10.32 LEFT LOWER QUADRANT ABDOMINAL PAIN: ICD-10-CM

## 2024-07-17 DIAGNOSIS — Z87.891 PERSONAL HISTORY OF TOBACCO USE: ICD-10-CM

## 2024-07-17 PROCEDURE — 71271 CT THORAX LUNG CANCER SCR C-: CPT

## 2024-07-17 PROCEDURE — 76856 US EXAM PELVIC COMPLETE: CPT

## 2024-07-30 DIAGNOSIS — R25.2 MUSCLE CRAMPS: ICD-10-CM

## 2024-07-30 DIAGNOSIS — E11.65 TYPE 2 DIABETES MELLITUS WITH HYPERGLYCEMIA, WITH LONG-TERM CURRENT USE OF INSULIN (HCC): ICD-10-CM

## 2024-07-30 DIAGNOSIS — Z79.4 TYPE 2 DIABETES MELLITUS WITH HYPERGLYCEMIA, WITH LONG-TERM CURRENT USE OF INSULIN (HCC): ICD-10-CM

## 2024-07-31 RX ORDER — TIZANIDINE 4 MG/1
4 TABLET ORAL NIGHTLY PRN
Qty: 10 TABLET | Refills: 1 | Status: SHIPPED | OUTPATIENT
Start: 2024-07-31

## 2024-07-31 RX ORDER — DULAGLUTIDE 3 MG/.5ML
INJECTION, SOLUTION SUBCUTANEOUS
Qty: 2 ADJUSTABLE DOSE PRE-FILLED PEN SYRINGE | Refills: 1 | Status: SHIPPED | OUTPATIENT
Start: 2024-07-31

## 2024-07-31 NOTE — TELEPHONE ENCOUNTER
Requested Prescriptions     Pending Prescriptions Disp Refills    Continuous Glucose Sensor (FREESTYLE VENECIA 2 SENSOR) MISC 2 each 1     Sig: USE AS DIRECTED TO MONITOR BLOOD SUGAR    tiZANidine (ZANAFLEX) 4 MG tablet 10 tablet 1     Sig: Take 1 tablet by mouth nightly as needed (muscle spasm)    Dulaglutide (TRULICITY) 3 MG/0.5ML SOPN 2 Adjustable Dose Pre-filled Pen Syringe 1     Sig: INJECT 3 MG INTO THE SKIN ONE TIME PER WEEK

## 2024-09-08 DIAGNOSIS — I25.84 CORONARY ARTERY CALCIFICATION: ICD-10-CM

## 2024-09-08 DIAGNOSIS — R25.2 MUSCLE CRAMPS: ICD-10-CM

## 2024-09-08 DIAGNOSIS — K21.9 GASTROESOPHAGEAL REFLUX DISEASE, UNSPECIFIED WHETHER ESOPHAGITIS PRESENT: ICD-10-CM

## 2024-09-08 DIAGNOSIS — I25.10 CORONARY ARTERY CALCIFICATION: ICD-10-CM

## 2024-09-10 RX ORDER — PANTOPRAZOLE SODIUM 40 MG/1
40 TABLET, DELAYED RELEASE ORAL DAILY
Qty: 90 TABLET | Refills: 1 | Status: SHIPPED | OUTPATIENT
Start: 2024-09-10

## 2024-09-10 RX ORDER — ROSUVASTATIN CALCIUM 5 MG/1
5 TABLET, COATED ORAL NIGHTLY
Qty: 90 TABLET | Refills: 1 | Status: SHIPPED | OUTPATIENT
Start: 2024-09-10

## 2024-09-25 ENCOUNTER — TELEPHONE (OUTPATIENT)
Dept: GASTROENTEROLOGY | Age: 69
End: 2024-09-25

## 2024-09-25 ENCOUNTER — OFFICE VISIT (OUTPATIENT)
Dept: FAMILY MEDICINE CLINIC | Age: 69
End: 2024-09-25

## 2024-09-25 VITALS
DIASTOLIC BLOOD PRESSURE: 66 MMHG | HEIGHT: 65 IN | SYSTOLIC BLOOD PRESSURE: 110 MMHG | WEIGHT: 227 LBS | HEART RATE: 80 BPM | BODY MASS INDEX: 37.82 KG/M2 | OXYGEN SATURATION: 95 %

## 2024-09-25 DIAGNOSIS — K62.5 RECTAL BLEEDING: ICD-10-CM

## 2024-09-25 DIAGNOSIS — S93.402D SPRAIN OF LEFT ANKLE, UNSPECIFIED LIGAMENT, SUBSEQUENT ENCOUNTER: ICD-10-CM

## 2024-09-25 DIAGNOSIS — I10 ESSENTIAL HYPERTENSION: ICD-10-CM

## 2024-09-25 DIAGNOSIS — E55.9 VITAMIN D DEFICIENCY: ICD-10-CM

## 2024-09-25 DIAGNOSIS — Z79.4 TYPE 2 DIABETES MELLITUS WITH HYPERGLYCEMIA, WITH LONG-TERM CURRENT USE OF INSULIN (HCC): Primary | ICD-10-CM

## 2024-09-25 DIAGNOSIS — E11.65 TYPE 2 DIABETES MELLITUS WITH HYPERGLYCEMIA, WITH LONG-TERM CURRENT USE OF INSULIN (HCC): Primary | ICD-10-CM

## 2024-09-25 LAB — HBA1C MFR BLD: 7.1 %

## 2024-09-25 ASSESSMENT — ENCOUNTER SYMPTOMS
CHEST TIGHTNESS: 0
SHORTNESS OF BREATH: 0
DIARRHEA: 0
VOMITING: 0
WHEEZING: 0
NAUSEA: 0
SORE THROAT: 0
CONSTIPATION: 1
COUGH: 0
EYE DISCHARGE: 0
ANAL BLEEDING: 1
ABDOMINAL PAIN: 0

## 2024-09-27 ENCOUNTER — HOSPITAL ENCOUNTER (OUTPATIENT)
Age: 69
Setting detail: SPECIMEN
Discharge: HOME | End: 2024-09-27

## 2024-09-27 DIAGNOSIS — I10 ESSENTIAL HYPERTENSION: ICD-10-CM

## 2024-09-27 DIAGNOSIS — E55.9 VITAMIN D DEFICIENCY: ICD-10-CM

## 2024-09-27 DIAGNOSIS — E11.65 TYPE 2 DIABETES MELLITUS WITH HYPERGLYCEMIA, WITH LONG-TERM CURRENT USE OF INSULIN (HCC): ICD-10-CM

## 2024-09-27 DIAGNOSIS — Z79.4 TYPE 2 DIABETES MELLITUS WITH HYPERGLYCEMIA, WITH LONG-TERM CURRENT USE OF INSULIN (HCC): ICD-10-CM

## 2024-09-27 LAB
25(OH)D3 SERPL-MCNC: 39 NG/ML (ref 30–100)
ALBUMIN SERPL-MCNC: 4.3 G/DL (ref 3.5–5.2)
ALBUMIN/GLOB SERPL: 1 {RATIO} (ref 1–2.5)
ALP SERPL-CCNC: 98 U/L (ref 35–104)
ALT SERPL-CCNC: 20 U/L (ref 10–35)
ANION GAP SERPL CALCULATED.3IONS-SCNC: 12 MMOL/L (ref 9–16)
AST SERPL-CCNC: 23 U/L (ref 10–35)
BASOPHILS # BLD: 0.05 K/UL (ref 0–0.2)
BASOPHILS NFR BLD: 1 % (ref 0–2)
BILIRUB SERPL-MCNC: 0.5 MG/DL (ref 0–1.2)
BUN SERPL-MCNC: 10 MG/DL (ref 8–23)
CALCIUM SERPL-MCNC: 9.6 MG/DL (ref 8.6–10.4)
CHLORIDE SERPL-SCNC: 103 MMOL/L (ref 98–107)
CHOLEST SERPL-MCNC: 144 MG/DL (ref 0–199)
CHOLESTEROL/HDL RATIO: 2
CO2 SERPL-SCNC: 24 MMOL/L (ref 20–31)
CREAT SERPL-MCNC: 0.8 MG/DL (ref 0.5–0.9)
CREAT UR-MCNC: 187 MG/DL (ref 28–217)
EOSINOPHIL # BLD: 0.16 K/UL (ref 0–0.44)
EOSINOPHILS RELATIVE PERCENT: 2 % (ref 1–4)
ERYTHROCYTE [DISTWIDTH] IN BLOOD BY AUTOMATED COUNT: 12.9 % (ref 11.8–14.4)
GFR, ESTIMATED: 83 ML/MIN/1.73M2
GLUCOSE P FAST SERPL-MCNC: 133 MG/DL (ref 74–99)
HCT VFR BLD AUTO: 41.6 % (ref 36.3–47.1)
HDLC SERPL-MCNC: 63 MG/DL
HGB BLD-MCNC: 13.8 G/DL (ref 11.9–15.1)
IMM GRANULOCYTES # BLD AUTO: <0.03 K/UL (ref 0–0.3)
IMM GRANULOCYTES NFR BLD: 0 %
LDLC SERPL CALC-MCNC: 60 MG/DL (ref 0–100)
LYMPHOCYTES NFR BLD: 2.21 K/UL (ref 1.1–3.7)
LYMPHOCYTES RELATIVE PERCENT: 28 % (ref 24–43)
MCH RBC QN AUTO: 30.8 PG (ref 25.2–33.5)
MCHC RBC AUTO-ENTMCNC: 33.2 G/DL (ref 28.4–34.8)
MCV RBC AUTO: 92.9 FL (ref 82.6–102.9)
MICROALBUMIN UR-MCNC: <12 MG/L (ref 0–20)
MICROALBUMIN/CREAT UR-RTO: NORMAL MCG/MG CREAT (ref 0–25)
MONOCYTES NFR BLD: 0.66 K/UL (ref 0.1–1.2)
MONOCYTES NFR BLD: 8 % (ref 3–12)
NEUTROPHILS NFR BLD: 61 % (ref 36–65)
NEUTS SEG NFR BLD: 4.88 K/UL (ref 1.5–8.1)
NRBC BLD-RTO: 0 PER 100 WBC
PLATELET # BLD AUTO: 244 K/UL (ref 138–453)
PMV BLD AUTO: 10.1 FL (ref 8.1–13.5)
POTASSIUM SERPL-SCNC: 4.3 MMOL/L (ref 3.7–5.3)
PROT SERPL-MCNC: 7.2 G/DL (ref 6.6–8.7)
RBC # BLD AUTO: 4.48 M/UL (ref 3.95–5.11)
SODIUM SERPL-SCNC: 139 MMOL/L (ref 136–145)
TRIGL SERPL-MCNC: 101 MG/DL (ref 0–149)
TSH SERPL DL<=0.05 MIU/L-ACNC: 2.01 UIU/ML (ref 0.27–4.2)
VLDLC SERPL CALC-MCNC: 20 MG/DL
WBC OTHER # BLD: 8 K/UL (ref 3.5–11.3)

## 2024-10-03 ENCOUNTER — HOSPITAL ENCOUNTER (OUTPATIENT)
Dept: MRI IMAGING | Age: 69
Discharge: HOME | End: 2024-10-05
Payer: MEDICARE

## 2024-10-03 DIAGNOSIS — S93.402D SPRAIN OF LEFT ANKLE, UNSPECIFIED LIGAMENT, SUBSEQUENT ENCOUNTER: ICD-10-CM

## 2024-10-03 PROCEDURE — 73721 MRI JNT OF LWR EXTRE W/O DYE: CPT

## 2024-10-05 DIAGNOSIS — I25.10 CORONARY ARTERY CALCIFICATION: ICD-10-CM

## 2024-10-05 DIAGNOSIS — K21.9 GASTROESOPHAGEAL REFLUX DISEASE, UNSPECIFIED WHETHER ESOPHAGITIS PRESENT: ICD-10-CM

## 2024-10-05 DIAGNOSIS — I10 ESSENTIAL HYPERTENSION: ICD-10-CM

## 2024-10-05 DIAGNOSIS — R25.2 MUSCLE CRAMPS: ICD-10-CM

## 2024-10-07 DIAGNOSIS — I10 ESSENTIAL HYPERTENSION: ICD-10-CM

## 2024-10-07 DIAGNOSIS — M19.072 ARTHRITIS OF LEFT ANKLE: Primary | ICD-10-CM

## 2024-10-07 RX ORDER — ATENOLOL 50 MG/1
50 TABLET ORAL DAILY
Qty: 90 TABLET | Refills: 1 | Status: SHIPPED | OUTPATIENT
Start: 2024-10-07

## 2024-10-07 RX ORDER — PANTOPRAZOLE SODIUM 40 MG/1
40 TABLET, DELAYED RELEASE ORAL DAILY
Qty: 90 TABLET | Refills: 1 | Status: SHIPPED | OUTPATIENT
Start: 2024-10-07

## 2024-10-07 RX ORDER — ROSUVASTATIN CALCIUM 5 MG/1
5 TABLET, COATED ORAL NIGHTLY
Qty: 90 TABLET | Refills: 1 | Status: SHIPPED | OUTPATIENT
Start: 2024-10-07

## 2024-10-07 SDOH — HEALTH STABILITY: PHYSICAL HEALTH: ON AVERAGE, HOW MANY DAYS PER WEEK DO YOU ENGAGE IN MODERATE TO STRENUOUS EXERCISE (LIKE A BRISK WALK)?: 0 DAYS

## 2024-10-07 SDOH — HEALTH STABILITY: PHYSICAL HEALTH: ON AVERAGE, HOW MANY MINUTES DO YOU ENGAGE IN EXERCISE AT THIS LEVEL?: 0 MIN

## 2024-10-07 NOTE — TELEPHONE ENCOUNTER
Love Rosa is calling to request a refill on the following medication(s):    Medication Request:  Requested Prescriptions     Pending Prescriptions Disp Refills    atenolol (TENORMIN) 50 MG tablet 90 tablet 1     Sig: Take 1 tablet by mouth daily    pantoprazole (PROTONIX) 40 MG tablet 90 tablet 1     Sig: Take 1 tablet by mouth daily    rosuvastatin (CRESTOR) 5 MG tablet 90 tablet 1     Sig: Take 1 tablet by mouth nightly    tiZANidine (ZANAFLEX) 4 MG tablet 10 tablet 1     Sig: Take 1 tablet by mouth nightly as needed (muscle spasm)       Last Visit Date (If Applicable):  9/25/2024    Next Visit Date:    Visit date not found

## 2024-10-07 NOTE — TELEPHONE ENCOUNTER
Love Rosa is calling to request a refill on the following medication(s):    Medication Request:  Requested Prescriptions     Pending Prescriptions Disp Refills    amLODIPine (NORVASC) 5 MG tablet [Pharmacy Med Name: AMLODIPINE BESYLATE 5 MG TAB] 90 tablet 1     Sig: TAKE 1 TABLET BY MOUTH EVERY DAY       Last Visit Date (If Applicable):  9/25/2024    Next Visit Date:    Visit date not found

## 2024-10-08 ENCOUNTER — OFFICE VISIT (OUTPATIENT)
Age: 69
End: 2024-10-08
Payer: MEDICARE

## 2024-10-08 VITALS — HEIGHT: 65 IN | BODY MASS INDEX: 37.82 KG/M2 | WEIGHT: 227 LBS

## 2024-10-08 DIAGNOSIS — M79.672 LEFT FOOT PAIN: ICD-10-CM

## 2024-10-08 DIAGNOSIS — M25.572 ARTHRALGIA OF HINDFOOT, LEFT: Primary | ICD-10-CM

## 2024-10-08 PROCEDURE — 1123F ACP DISCUSS/DSCN MKR DOCD: CPT

## 2024-10-08 PROCEDURE — 99214 OFFICE O/P EST MOD 30 MIN: CPT

## 2024-10-08 RX ORDER — AMLODIPINE BESYLATE 5 MG/1
5 TABLET ORAL DAILY
Qty: 90 TABLET | Refills: 1 | Status: SHIPPED | OUTPATIENT
Start: 2024-10-08

## 2024-10-08 NOTE — PROGRESS NOTES
True   Transportation Needs: No Transportation Needs (6/25/2024)    PRAPARE - Transportation     Lack of Transportation (Medical): No     Lack of Transportation (Non-Medical): No   Physical Activity: Inactive (10/7/2024)    Exercise Vital Sign     Days of Exercise per Week: 0 days     Minutes of Exercise per Session: 0 min   Stress: Not on file   Social Connections: Not on file   Intimate Partner Violence: Not on file   Housing Stability: Unknown (6/25/2024)    Housing Stability Vital Sign     Unable to Pay for Housing in the Last Year: No     Number of Places Lived in the Last Year: Not on file     Unstable Housing in the Last Year: No     Past Medical History:   Diagnosis Date    Breast cancer (HCC)     left breast    Diabetes mellitus (HCC)     Emphysema (subcutaneous) (surgical) resulting from a procedure     History of therapeutic radiation     Hx of blood clots     left leg    Hyperlipidemia     Hypertension     Kidney calculi     passed on her own    Neuropathy     DIONI on CPAP     uses nightly    Prolonged emergence from general anesthesia     Sleep apnea     Type 2 diabetes mellitus without complication (HCC)      Past Surgical History:   Procedure Laterality Date    BREAST BIOPSY Left 12/15/2020    LEFT BREAST LUMPECTOMY WITH   NEEDLE LOCALIZATION (11:30) performed by Sunitha Greene DO at [facility] OR    CHOLECYSTECTOMY      COLONOSCOPY      removed polyps    COLONOSCOPY  10/26/2020    with Biopsy    COLONOSCOPY N/A 10/26/2020    COLONOSCOPY POLYPECTOMY  BIOPSY performed by Paola Duval MD at [facility] OR    COLONOSCOPY N/A 1/18/2024    COLONOSCOPY POLYPECTOMY SNARE/COLD BIOPSY performed by Paola Duval MD at [facility] OR    ENDOSCOPY, COLON, DIAGNOSTIC      KNEE ARTHROSCOPY Left 1995    HARI STEROTACTIC LOC BREAST BIOPSY LEFT  11/10/2020    HARI STEROTACTIC LOC BREAST BIOPSY LEFT 11/10/2020 [facility] MAMMOGRAPHY    OTHER SURGICAL HISTORY      DNC for post menapausal bleeding    SKIN BIOPSY      TONSILLECTOMY      US

## 2024-11-14 DIAGNOSIS — R25.2 MUSCLE CRAMPS: ICD-10-CM

## 2024-11-15 NOTE — TELEPHONE ENCOUNTER
Lvoe Rosa is calling to request a refill on the following medication(s):    Medication Request:  Requested Prescriptions     Pending Prescriptions Disp Refills    tiZANidine (ZANAFLEX) 4 MG tablet 10 tablet 1     Sig: Take 1 tablet by mouth nightly as needed (muscle spasm)       Last Visit Date (If Applicable):  9/25/2024    Next Visit Date:    Visit date not found

## 2024-12-08 ENCOUNTER — E-VISIT (OUTPATIENT)
Dept: PRIMARY CARE CLINIC | Age: 69
End: 2024-12-08
Payer: MEDICARE

## 2024-12-08 DIAGNOSIS — J01.90 ACUTE NON-RECURRENT SINUSITIS, UNSPECIFIED LOCATION: Primary | ICD-10-CM

## 2024-12-08 PROCEDURE — 99422 OL DIG E/M SVC 11-20 MIN: CPT | Performed by: NURSE PRACTITIONER

## 2024-12-08 RX ORDER — AZITHROMYCIN 250 MG/1
TABLET, FILM COATED ORAL
Qty: 1 PACKET | Refills: 0 | Status: SHIPPED | OUTPATIENT
Start: 2024-12-08 | End: 2024-12-18

## 2024-12-08 ASSESSMENT — LIFESTYLE VARIABLES
SMOKING_STATUS: NO, I'M A FORMER SMOKER
PACKS_PER_DAY: 1
SMOKING_YEARS: 35

## 2024-12-09 NOTE — PROGRESS NOTES
Love Rosa (1955) initiated an asynchronous digital communication through "[employer]".    HPI: per patient questionnaire     Exam: not applicable    Diagnoses and all orders for this visit:  Diagnoses and all orders for this visit:    Acute non-recurrent sinusitis, unspecified location    Other orders  -     azithromycin (ZITHROMAX) 250 MG tablet; 2 tablets now then 1 daily until gone.      Antibiotic sent.  Supportive care measures provided.  Follow-up with PCP as needed    Time: EV2 - 11-20 minutes were spent on the digital evaluation and management of this patient. 16 min    HORACE Hendricks - CNP

## 2025-01-19 DIAGNOSIS — Z79.4 TYPE 2 DIABETES MELLITUS WITH HYPERGLYCEMIA, WITH LONG-TERM CURRENT USE OF INSULIN (HCC): ICD-10-CM

## 2025-01-19 DIAGNOSIS — E11.65 TYPE 2 DIABETES MELLITUS WITH HYPERGLYCEMIA, WITH LONG-TERM CURRENT USE OF INSULIN (HCC): ICD-10-CM

## 2025-01-20 RX ORDER — DULAGLUTIDE 3 MG/.5ML
INJECTION, SOLUTION SUBCUTANEOUS
Qty: 2 ADJUSTABLE DOSE PRE-FILLED PEN SYRINGE | Refills: 0 | Status: SHIPPED | OUTPATIENT
Start: 2025-01-20 | End: 2025-01-28 | Stop reason: SDUPTHER

## 2025-01-20 NOTE — TELEPHONE ENCOUNTER
Love Rosa is calling to request a refill on the following medication(s):    Medication Request:  Requested Prescriptions     Pending Prescriptions Disp Refills    TRULICITY 3 MG/0.5ML SOAJ [Pharmacy Med Name: TRULICITY 3 MG/0.5 ML PEN]  1     Sig: INJECT 3 MG INTO THE SKIN ONE TIME PER WEEK       Last Visit Date (If Applicable):  9/25/2024    Next Visit Date:    Visit date not found

## 2025-01-28 DIAGNOSIS — R25.2 MUSCLE CRAMPS: ICD-10-CM

## 2025-01-28 DIAGNOSIS — Z79.4 TYPE 2 DIABETES MELLITUS WITH HYPERGLYCEMIA, WITH LONG-TERM CURRENT USE OF INSULIN (HCC): ICD-10-CM

## 2025-01-28 DIAGNOSIS — E11.65 TYPE 2 DIABETES MELLITUS WITH HYPERGLYCEMIA, WITH LONG-TERM CURRENT USE OF INSULIN (HCC): ICD-10-CM

## 2025-01-28 DIAGNOSIS — I25.10 CORONARY ARTERY CALCIFICATION: ICD-10-CM

## 2025-01-29 RX ORDER — ROSUVASTATIN CALCIUM 5 MG/1
5 TABLET, COATED ORAL NIGHTLY
Qty: 90 TABLET | Refills: 0 | Status: SHIPPED | OUTPATIENT
Start: 2025-01-29

## 2025-01-29 RX ORDER — DULAGLUTIDE 3 MG/.5ML
3 INJECTION, SOLUTION SUBCUTANEOUS WEEKLY
Qty: 2 ADJUSTABLE DOSE PRE-FILLED PEN SYRINGE | Refills: 0 | Status: SHIPPED | OUTPATIENT
Start: 2025-01-29

## 2025-01-29 NOTE — TELEPHONE ENCOUNTER
Love Rosa is calling to request a refill on the following medication(s):    Medication Request:  Requested Prescriptions     Pending Prescriptions Disp Refills    Insulin Pen Needle 32G X 4 MM MISC 100 each 3     Si each by Does not apply route daily    Continuous Glucose Sensor (FREESTYLE VENECIA 2 SENSOR) MISC 2 each 1     Sig: USE AS DIRECTED TO MONITOR BLOOD SUGAR    rosuvastatin (CRESTOR) 5 MG tablet 90 tablet 1     Sig: Take 1 tablet by mouth nightly    tiZANidine (ZANAFLEX) 4 MG tablet 10 tablet 1     Sig: Take 1 tablet by mouth nightly as needed (muscle spasm)    Dulaglutide (TRULICITY) 3 MG/0.5ML SOAJ 2 Adjustable Dose Pre-filled Pen Syringe 0       Last Visit Date (If Applicable):  2024    Next Visit Date:    Visit date not found

## 2025-03-18 ENCOUNTER — HOSPITAL ENCOUNTER (OUTPATIENT)
Dept: MAMMOGRAPHY | Age: 70
Discharge: HOME | End: 2025-03-20
Payer: MEDICARE

## 2025-03-18 VITALS — WEIGHT: 227 LBS | BODY MASS INDEX: 37.82 KG/M2 | HEIGHT: 65 IN

## 2025-03-18 DIAGNOSIS — Z12.31 VISIT FOR SCREENING MAMMOGRAM: ICD-10-CM

## 2025-03-18 PROCEDURE — 77063 BREAST TOMOSYNTHESIS BI: CPT

## 2025-03-24 ENCOUNTER — RESULTS FOLLOW-UP (OUTPATIENT)
Dept: MAMMOGRAPHY | Age: 70
End: 2025-03-24

## 2025-04-03 DIAGNOSIS — I10 ESSENTIAL HYPERTENSION: ICD-10-CM

## 2025-04-03 RX ORDER — ATENOLOL 50 MG/1
50 TABLET ORAL DAILY
Qty: 90 TABLET | Refills: 0 | Status: SHIPPED | OUTPATIENT
Start: 2025-04-03

## 2025-04-03 NOTE — TELEPHONE ENCOUNTER
Love Rosa is calling to request a refill on the following medication(s):    Medication Request:  Requested Prescriptions     Pending Prescriptions Disp Refills    atenolol (TENORMIN) 50 MG tablet [Pharmacy Med Name: ATENOLOL 50 MG TABLET] 90 tablet 1     Sig: TAKE 1 TABLET BY MOUTH EVERY DAY       Last Visit Date (If Applicable):  9/25/2024    Next Visit Date:    Visit date not found

## 2025-04-12 DIAGNOSIS — E11.65 TYPE 2 DIABETES MELLITUS WITH HYPERGLYCEMIA, WITH LONG-TERM CURRENT USE OF INSULIN (HCC): ICD-10-CM

## 2025-04-12 DIAGNOSIS — Z79.4 TYPE 2 DIABETES MELLITUS WITH HYPERGLYCEMIA, WITH LONG-TERM CURRENT USE OF INSULIN (HCC): ICD-10-CM

## 2025-04-14 DIAGNOSIS — R25.2 MUSCLE CRAMPS: ICD-10-CM

## 2025-04-14 DIAGNOSIS — E11.65 TYPE 2 DIABETES MELLITUS WITH HYPERGLYCEMIA, WITH LONG-TERM CURRENT USE OF INSULIN (HCC): ICD-10-CM

## 2025-04-14 DIAGNOSIS — Z79.4 TYPE 2 DIABETES MELLITUS WITH HYPERGLYCEMIA, WITH LONG-TERM CURRENT USE OF INSULIN (HCC): ICD-10-CM

## 2025-04-14 RX ORDER — DULAGLUTIDE 3 MG/.5ML
3 INJECTION, SOLUTION SUBCUTANEOUS WEEKLY
Qty: 2 ADJUSTABLE DOSE PRE-FILLED PEN SYRINGE | Refills: 0 | OUTPATIENT
Start: 2025-04-14

## 2025-04-14 RX ORDER — DULAGLUTIDE 3 MG/.5ML
INJECTION, SOLUTION SUBCUTANEOUS
Qty: 2 ADJUSTABLE DOSE PRE-FILLED PEN SYRINGE | Refills: 1 | Status: SHIPPED | OUTPATIENT
Start: 2025-04-14

## 2025-04-14 NOTE — TELEPHONE ENCOUNTER
Love Rosa is calling to request a refill on the following medication(s):    Medication Request:  Requested Prescriptions     Pending Prescriptions Disp Refills    tiZANidine (ZANAFLEX) 4 MG tablet 10 tablet 0     Sig: Take 1 tablet by mouth nightly as needed (muscle spasm)    Dulaglutide (TRULICITY) 3 MG/0.5ML SOAJ 2 Adjustable Dose Pre-filled Pen Syringe 0     Sig: Inject 3 mg into the skin once a week       Last Visit Date (If Applicable):  9/25/2024    Next Visit Date:    5/5/2025

## 2025-04-14 NOTE — TELEPHONE ENCOUNTER
Love Rosa is calling to request a refill on the following medication(s):    Medication Request:  Requested Prescriptions     Pending Prescriptions Disp Refills    TRULICITY 3 MG/0.5ML SOAJ [Pharmacy Med Name: TRULICITY 3 MG/0.5 ML PEN]       Sig: INJECT 3 MG INTO THE SKIN ONE TIME PER WEEK       Last Visit Date (If Applicable):  9/25/2024    Next Visit Date:    4/14/2025

## 2025-04-28 RX ORDER — PEN NEEDLE, DIABETIC 32GX 5/32"
NEEDLE, DISPOSABLE MISCELLANEOUS
Qty: 100 EACH | Refills: 1 | Status: SHIPPED | OUTPATIENT
Start: 2025-04-28

## 2025-04-28 NOTE — TELEPHONE ENCOUNTER
Love Rosa is calling to request a refill on the following medication(s):    Medication Request:  Requested Prescriptions     Pending Prescriptions Disp Refills    BD PEN NEEDLE ROSALINO 2ND GEN 32G X 4 MM MISC [Pharmacy Med Name: BD ROSALINO 2 GEN PEN NDL 32G 4MM]       Sig: USE AS DIRECTED       Last Visit Date (If Applicable):  9/25/2024    Next Visit Date:    5/5/2025

## 2025-05-02 SDOH — ECONOMIC STABILITY: FOOD INSECURITY: WITHIN THE PAST 12 MONTHS, THE FOOD YOU BOUGHT JUST DIDN'T LAST AND YOU DIDN'T HAVE MONEY TO GET MORE.: NEVER TRUE

## 2025-05-02 SDOH — ECONOMIC STABILITY: INCOME INSECURITY: IN THE LAST 12 MONTHS, WAS THERE A TIME WHEN YOU WERE NOT ABLE TO PAY THE MORTGAGE OR RENT ON TIME?: NO

## 2025-05-02 SDOH — ECONOMIC STABILITY: FOOD INSECURITY: WITHIN THE PAST 12 MONTHS, YOU WORRIED THAT YOUR FOOD WOULD RUN OUT BEFORE YOU GOT MONEY TO BUY MORE.: NEVER TRUE

## 2025-05-02 ASSESSMENT — PATIENT HEALTH QUESTIONNAIRE - PHQ9
6. FEELING BAD ABOUT YOURSELF - OR THAT YOU ARE A FAILURE OR HAVE LET YOURSELF OR YOUR FAMILY DOWN: NOT AT ALL
4. FEELING TIRED OR HAVING LITTLE ENERGY: MORE THAN HALF THE DAYS
SUM OF ALL RESPONSES TO PHQ QUESTIONS 1-9: 11
SUM OF ALL RESPONSES TO PHQ9 QUESTIONS 1 & 2: 4
1. LITTLE INTEREST OR PLEASURE IN DOING THINGS: MORE THAN HALF THE DAYS
SUM OF ALL RESPONSES TO PHQ QUESTIONS 1-9: 11
5. POOR APPETITE OR OVEREATING: NEARLY EVERY DAY
10. IF YOU CHECKED OFF ANY PROBLEMS, HOW DIFFICULT HAVE THESE PROBLEMS MADE IT FOR YOU TO DO YOUR WORK, TAKE CARE OF THINGS AT HOME, OR GET ALONG WITH OTHER PEOPLE: VERY DIFFICULT
7. TROUBLE CONCENTRATING ON THINGS, SUCH AS READING THE NEWSPAPER OR WATCHING TELEVISION: NOT AT ALL
SUM OF ALL RESPONSES TO PHQ QUESTIONS 1-9: 11
2. FEELING DOWN, DEPRESSED OR HOPELESS: MORE THAN HALF THE DAYS
3. TROUBLE FALLING OR STAYING ASLEEP: MORE THAN HALF THE DAYS
3. TROUBLE FALLING OR STAYING ASLEEP: MORE THAN HALF THE DAYS
8. MOVING OR SPEAKING SO SLOWLY THAT OTHER PEOPLE COULD HAVE NOTICED. OR THE OPPOSITE, BEING SO FIGETY OR RESTLESS THAT YOU HAVE BEEN MOVING AROUND A LOT MORE THAN USUAL: NOT AT ALL
9. THOUGHTS THAT YOU WOULD BE BETTER OFF DEAD, OR OF HURTING YOURSELF: NOT AT ALL
4. FEELING TIRED OR HAVING LITTLE ENERGY: MORE THAN HALF THE DAYS
7. TROUBLE CONCENTRATING ON THINGS, SUCH AS READING THE NEWSPAPER OR WATCHING TELEVISION: NOT AT ALL
5. POOR APPETITE OR OVEREATING: NEARLY EVERY DAY
8. MOVING OR SPEAKING SO SLOWLY THAT OTHER PEOPLE COULD HAVE NOTICED. OR THE OPPOSITE - BEING SO FIDGETY OR RESTLESS THAT YOU HAVE BEEN MOVING AROUND A LOT MORE THAN USUAL: NOT AT ALL
9. THOUGHTS THAT YOU WOULD BE BETTER OFF DEAD, OR OF HURTING YOURSELF: NOT AT ALL
6. FEELING BAD ABOUT YOURSELF - OR THAT YOU ARE A FAILURE OR HAVE LET YOURSELF OR YOUR FAMILY DOWN: NOT AT ALL
1. LITTLE INTEREST OR PLEASURE IN DOING THINGS: MORE THAN HALF THE DAYS
10. IF YOU CHECKED OFF ANY PROBLEMS, HOW DIFFICULT HAVE THESE PROBLEMS MADE IT FOR YOU TO DO YOUR WORK, TAKE CARE OF THINGS AT HOME, OR GET ALONG WITH OTHER PEOPLE: VERY DIFFICULT
SUM OF ALL RESPONSES TO PHQ QUESTIONS 1-9: 11
SUM OF ALL RESPONSES TO PHQ QUESTIONS 1-9: 11
2. FEELING DOWN, DEPRESSED OR HOPELESS: MORE THAN HALF THE DAYS

## 2025-05-03 SDOH — HEALTH STABILITY: PHYSICAL HEALTH: ON AVERAGE, HOW MANY MINUTES DO YOU ENGAGE IN EXERCISE AT THIS LEVEL?: 0 MIN

## 2025-05-03 SDOH — HEALTH STABILITY: PHYSICAL HEALTH: ON AVERAGE, HOW MANY DAYS PER WEEK DO YOU ENGAGE IN MODERATE TO STRENUOUS EXERCISE (LIKE A BRISK WALK)?: 0 DAYS

## 2025-05-03 ASSESSMENT — LIFESTYLE VARIABLES
HOW OFTEN DO YOU HAVE SIX OR MORE DRINKS ON ONE OCCASION: 1
HOW OFTEN DO YOU HAVE A DRINK CONTAINING ALCOHOL: 1
HOW OFTEN DO YOU HAVE A DRINK CONTAINING ALCOHOL: NEVER
HOW MANY STANDARD DRINKS CONTAINING ALCOHOL DO YOU HAVE ON A TYPICAL DAY: 0
HOW MANY STANDARD DRINKS CONTAINING ALCOHOL DO YOU HAVE ON A TYPICAL DAY: PATIENT DOES NOT DRINK

## 2025-05-05 ENCOUNTER — HOSPITAL ENCOUNTER (OUTPATIENT)
Dept: GENERAL RADIOLOGY | Age: 70
Discharge: HOME | End: 2025-05-07
Payer: MEDICARE

## 2025-05-05 ENCOUNTER — OFFICE VISIT (OUTPATIENT)
Dept: FAMILY MEDICINE CLINIC | Age: 70
End: 2025-05-05
Payer: MEDICARE

## 2025-05-05 VITALS
DIASTOLIC BLOOD PRESSURE: 70 MMHG | OXYGEN SATURATION: 96 % | HEIGHT: 65 IN | HEART RATE: 88 BPM | BODY MASS INDEX: 37.15 KG/M2 | WEIGHT: 223 LBS | SYSTOLIC BLOOD PRESSURE: 118 MMHG | TEMPERATURE: 97 F

## 2025-05-05 DIAGNOSIS — M25.551 BILATERAL HIP PAIN: ICD-10-CM

## 2025-05-05 DIAGNOSIS — Z00.00 MEDICARE ANNUAL WELLNESS VISIT, SUBSEQUENT: Primary | ICD-10-CM

## 2025-05-05 DIAGNOSIS — E66.01 MORBID (SEVERE) OBESITY DUE TO EXCESS CALORIES (HCC): ICD-10-CM

## 2025-05-05 DIAGNOSIS — M25.552 BILATERAL HIP PAIN: ICD-10-CM

## 2025-05-05 DIAGNOSIS — G89.29 CHRONIC BILATERAL LOW BACK PAIN WITH LEFT-SIDED SCIATICA: ICD-10-CM

## 2025-05-05 DIAGNOSIS — M54.42 CHRONIC BILATERAL LOW BACK PAIN WITH LEFT-SIDED SCIATICA: ICD-10-CM

## 2025-05-05 DIAGNOSIS — E11.42 DIABETIC POLYNEUROPATHY ASSOCIATED WITH TYPE 2 DIABETES MELLITUS (HCC): ICD-10-CM

## 2025-05-05 DIAGNOSIS — C50.912 MALIGNANT NEOPLASM OF LEFT FEMALE BREAST, UNSPECIFIED ESTROGEN RECEPTOR STATUS, UNSPECIFIED SITE OF BREAST (HCC): ICD-10-CM

## 2025-05-05 PROCEDURE — G0439 PPPS, SUBSEQ VISIT: HCPCS | Performed by: STUDENT IN AN ORGANIZED HEALTH CARE EDUCATION/TRAINING PROGRAM

## 2025-05-05 PROCEDURE — 72100 X-RAY EXAM L-S SPINE 2/3 VWS: CPT

## 2025-05-05 PROCEDURE — 1159F MED LIST DOCD IN RCRD: CPT | Performed by: STUDENT IN AN ORGANIZED HEALTH CARE EDUCATION/TRAINING PROGRAM

## 2025-05-05 PROCEDURE — 3078F DIAST BP <80 MM HG: CPT | Performed by: STUDENT IN AN ORGANIZED HEALTH CARE EDUCATION/TRAINING PROGRAM

## 2025-05-05 PROCEDURE — 73523 X-RAY EXAM HIPS BI 5/> VIEWS: CPT

## 2025-05-05 PROCEDURE — 1123F ACP DISCUSS/DSCN MKR DOCD: CPT | Performed by: STUDENT IN AN ORGANIZED HEALTH CARE EDUCATION/TRAINING PROGRAM

## 2025-05-05 PROCEDURE — 1160F RVW MEDS BY RX/DR IN RCRD: CPT | Performed by: STUDENT IN AN ORGANIZED HEALTH CARE EDUCATION/TRAINING PROGRAM

## 2025-05-05 PROCEDURE — 3074F SYST BP LT 130 MM HG: CPT | Performed by: STUDENT IN AN ORGANIZED HEALTH CARE EDUCATION/TRAINING PROGRAM

## 2025-05-05 RX ORDER — SEMAGLUTIDE 0.68 MG/ML
0.5 INJECTION, SOLUTION SUBCUTANEOUS
Qty: 3 ML | Refills: 0 | Status: SHIPPED | OUTPATIENT
Start: 2025-05-05 | End: 2025-05-08

## 2025-05-05 SDOH — ECONOMIC STABILITY: FOOD INSECURITY: WITHIN THE PAST 12 MONTHS, YOU WORRIED THAT YOUR FOOD WOULD RUN OUT BEFORE YOU GOT MONEY TO BUY MORE.: NEVER TRUE

## 2025-05-05 SDOH — ECONOMIC STABILITY: FOOD INSECURITY: WITHIN THE PAST 12 MONTHS, THE FOOD YOU BOUGHT JUST DIDN'T LAST AND YOU DIDN'T HAVE MONEY TO GET MORE.: NEVER TRUE

## 2025-05-05 ASSESSMENT — PATIENT HEALTH QUESTIONNAIRE - PHQ9
10. IF YOU CHECKED OFF ANY PROBLEMS, HOW DIFFICULT HAVE THESE PROBLEMS MADE IT FOR YOU TO DO YOUR WORK, TAKE CARE OF THINGS AT HOME, OR GET ALONG WITH OTHER PEOPLE: NOT DIFFICULT AT ALL
SUM OF ALL RESPONSES TO PHQ QUESTIONS 1-9: 0
8. MOVING OR SPEAKING SO SLOWLY THAT OTHER PEOPLE COULD HAVE NOTICED. OR THE OPPOSITE, BEING SO FIGETY OR RESTLESS THAT YOU HAVE BEEN MOVING AROUND A LOT MORE THAN USUAL: NOT AT ALL
4. FEELING TIRED OR HAVING LITTLE ENERGY: NOT AT ALL
7. TROUBLE CONCENTRATING ON THINGS, SUCH AS READING THE NEWSPAPER OR WATCHING TELEVISION: NOT AT ALL
2. FEELING DOWN, DEPRESSED OR HOPELESS: NOT AT ALL
6. FEELING BAD ABOUT YOURSELF - OR THAT YOU ARE A FAILURE OR HAVE LET YOURSELF OR YOUR FAMILY DOWN: NOT AT ALL
1. LITTLE INTEREST OR PLEASURE IN DOING THINGS: NOT AT ALL
SUM OF ALL RESPONSES TO PHQ QUESTIONS 1-9: 0
9. THOUGHTS THAT YOU WOULD BE BETTER OFF DEAD, OR OF HURTING YOURSELF: NOT AT ALL
3. TROUBLE FALLING OR STAYING ASLEEP: NOT AT ALL
5. POOR APPETITE OR OVEREATING: NOT AT ALL

## 2025-05-05 ASSESSMENT — VISUAL ACUITY
OS_CC: 20/20
OD_CC: 20/20

## 2025-05-05 NOTE — PROGRESS NOTES
Medicare Annual Wellness Visit    Love Rosa is here for Medicare AWV    Assessment & Plan   Medicare annual wellness visit, subsequent  Malignant neoplasm of left female breast, unspecified estrogen receptor status, unspecified site of breast (HCC)  Diabetic polyneuropathy associated with type 2 diabetes mellitus (HCC)  -     Tirzepatide (MOUNJARO) 2.5 MG/0.5ML SOAJ pen; Inject 2.5 mg into the skin every 7 days, Disp-2 mL, R-1Normal  Chronic bilateral low back pain with left-sided sciatica  -     XR LUMBAR SPINE (2-3 VIEWS); Future  Bilateral hip pain  -     XR HIP W PELVIS MIN 5 VWS BILATERAL; Future  Morbid (severe) obesity due to excess calories (E66.01)  Body mass index [BMI] 37.0-37.9, adult (Z68.37)     Return in 3 months (on 8/5/2025) for Follow up DM/HTN.     Subjective   The following acute and/or chronic problems were also addressed today:  She states that she is doing very depressed lately due to her foot issues.  She has been going to several orthopedics, physical therapy and chiropractor due to the left foot pain and has not found any resolution yet.  She states that the orthopedic did advise surgery but because of her diabetes she is concerned about the surgery, she is also concerned about the outcome from the surgery and does not want to lose her foot.  She states that she is not able to move around or walk normally anymore and that has been very distressing.  She states that her diet has also been affected by her anxiety and she is not eating well.  Her last A1c was 8.4 and she has been on Trulicity 3 mg/week along with Tresiba 20 units daily.  She has tried Ozempic previously but it made her gain weight, she has never tried Mounjaro.  She is also requesting x-rays for her back and hips so that she can take the results to her chiropractor.    Patient's complete Health Risk Assessment and screening values have been reviewed and are found in Flowsheets. The following problems were reviewed today

## 2025-05-05 NOTE — TELEPHONE ENCOUNTER
Love Rosa is calling to request a refill on the following medication(s):    Medication Request:  Requested Prescriptions     Pending Prescriptions Disp Refills    Semaglutide,0.25 or 0.5MG/DOS, (OZEMPIC, 0.25 OR 0.5 MG/DOSE,) 2 MG/3ML SOPN [Pharmacy Med Name: OZEMPIC 0.25-0.5 MG/DOSE PEN]  0       Last Visit Date (If Applicable):  5/5/2025    Next Visit Date:    8/5/2025

## 2025-05-07 ENCOUNTER — RESULTS FOLLOW-UP (OUTPATIENT)
Dept: FAMILY MEDICINE CLINIC | Age: 70
End: 2025-05-07

## 2025-05-07 ENCOUNTER — TELEPHONE (OUTPATIENT)
Dept: FAMILY MEDICINE CLINIC | Age: 70
End: 2025-05-07

## 2025-05-07 NOTE — TELEPHONE ENCOUNTER
Patient called and was concerned about her meds. She states on Monday there was a conversation at her appt  that the Mounjaro would be sent and the ozempic would be cancelled, she says she went to pharmacy to pick it up and they gave her ozempic..she wants to know why it was changed.

## 2025-05-08 DIAGNOSIS — Z79.4 TYPE 2 DIABETES MELLITUS WITH HYPERGLYCEMIA, WITH LONG-TERM CURRENT USE OF INSULIN (HCC): Primary | ICD-10-CM

## 2025-05-08 DIAGNOSIS — E11.65 TYPE 2 DIABETES MELLITUS WITH HYPERGLYCEMIA, WITH LONG-TERM CURRENT USE OF INSULIN (HCC): Primary | ICD-10-CM

## 2025-05-29 DIAGNOSIS — E11.65 TYPE 2 DIABETES MELLITUS WITH HYPERGLYCEMIA, WITH LONG-TERM CURRENT USE OF INSULIN (HCC): ICD-10-CM

## 2025-05-29 DIAGNOSIS — Z79.4 TYPE 2 DIABETES MELLITUS WITH HYPERGLYCEMIA, WITH LONG-TERM CURRENT USE OF INSULIN (HCC): ICD-10-CM

## 2025-05-29 RX ORDER — INSULIN DEGLUDEC 200 U/ML
20 INJECTION, SOLUTION SUBCUTANEOUS DAILY
Qty: 9 ADJUSTABLE DOSE PRE-FILLED PEN SYRINGE | Refills: 1 | Status: SHIPPED | OUTPATIENT
Start: 2025-05-29

## 2025-05-29 NOTE — TELEPHONE ENCOUNTER
Love Rosa is calling to request a refill on the following medication(s):    Medication Request:  Requested Prescriptions     Pending Prescriptions Disp Refills    TRESIBA FLEXTOUCH 200 UNIT/ML SOPN [Pharmacy Med Name: TRESIBA FLEXTOUCH 200 UNIT/ML]  3     Sig: INJECT 20 UNITS INTO THE SKIN DAILY       Last Visit Date (If Applicable):  5/5/2025    Next Visit Date:    8/5/2025

## 2025-06-02 ENCOUNTER — TELEPHONE (OUTPATIENT)
Dept: FAMILY MEDICINE CLINIC | Age: 70
End: 2025-06-02

## 2025-06-02 DIAGNOSIS — Z79.4 TYPE 2 DIABETES MELLITUS WITH HYPERGLYCEMIA, WITH LONG-TERM CURRENT USE OF INSULIN (HCC): Primary | ICD-10-CM

## 2025-06-02 DIAGNOSIS — E11.65 TYPE 2 DIABETES MELLITUS WITH HYPERGLYCEMIA, WITH LONG-TERM CURRENT USE OF INSULIN (HCC): Primary | ICD-10-CM

## 2025-06-02 NOTE — TELEPHONE ENCOUNTER
Patient states she would like a high dosages of monjaro because her glucose is reading in the upper 200's, please advise

## 2025-06-10 NOTE — PROGRESS NOTES
\   [place] [address]            Radiation Oncology          [address] [facility]          [place] [address] [place], [address]        Zach Ruffin: [phone]        F: [phone]       [email or web address]             RADIATION ONCOLOGY WEEKLY PROGRESS NOTE  Patient ID:   Osmin Reynolds  : 1955   MRN: [id number]    DIAGNOSIS:  Cancer Staging  Ductal carcinoma in situ (DCIS) of left breast  Staging form: Breast, AJCC 8th Edition  - Pathologic stage from 11/10/2020: Stage 0 (pTis (DCIS), pN0, cM0, G2, ER+, [place]+, HER2: Not Assessed) - Signed by Malcolm iMtchell MD on 2021      TREATMENT DETAILS:  Treatment Site: L Breast  Actual Dose: 2394cGy  Total Planned Dose: 4256cGy  Treatment Technique: 3D-CRT  Fraction Technique: Daily  Therapy imaging monitoring: KV match daily with MV ports  Concurrent Chemotherapy: NA    SUBJECTIVE:   Patient seen for their weekly on treatment evaluation today. Patient denies any skin irritation however does feel a lump in her left breast which is new. She also noted 2 areas of freckling on her skin. She continues to have fatigue significantly due to the lack of sleep only having slept 4 hours yesterday. She was prescribed Ativan however she is afraid to use it. She denies any changes in her appetite. OBJECTIVE:   CHAPERONE: Not Required    ECO Symptomatic but completely ambulatory    VITAL SIGNS: /83   Pulse 86   Temp 98 °F (36.7 °C)   Resp 20   Wt 263 lb 12.8 oz (119.7 kg)   SpO2 98%   BMI 42.58 kg/m²   Wt Readings from Last 5 Encounters:   21 263 lb 12.8 oz (119.7 kg)   21 263 lb (119.3 kg)   21 265 lb 1.6 oz (120.2 kg)   21 262 lb (118.8 kg)   21 262 lb 12.8 oz (119.2 kg)     GENERAL:  General appearance is that of a well-nourished, well-developed in no apparent distress. EXTREMITIES:  No clubbing, cyanosis, or edema. SKIN: No erythema or desquamation.   BREAST: (+) scar tissue palpable in left breast.     LABS:  WBC   Date Value Ref Range Status   11/30/2020 7.3 3.5 - 11.3 k/uL Final   02/18/2020 6.8 3.5 - 11.3 k/uL Final     Segs Absolute   Date Value Ref Range Status   11/30/2020 4.49 1.50 - 8.10 k/uL Final   02/18/2020 3.99 1.50 - 8.10 k/uL Final     Hemoglobin   Date Value Ref Range Status   11/30/2020 14.0 11.9 - 15.1 g/dL Final   02/18/2020 15.2 (H) 11.9 - 15.1 g/dL Final     Platelets   Date Value Ref Range Status   11/30/2020 201 138 - 453 k/uL Final   02/18/2020 See Reflexed IPF Result 138 - 453 k/uL Final     CREATININE   Date Value Ref Range Status   11/30/2020 0.63 0.50 - 0.90 mg/dL Final   02/18/2020 0.57 0.50 - 0.90 mg/dL Final     No results found for: , CEA  No results found for: PSA    MEDICATIONS:    Current Outpatient Medications:     Insulin Degludec (TRESIBA FLEXTOUCH) 200 UNIT/ML SOPN, INJECT 26 UNITS INTO THE SKIN DAILY, Disp: 2 pen, Rfl: 2    metFORMIN (GLUCOPHAGE) 500 MG tablet, TAKE 1 TABLET BY MOUTH TWICE A DAY WITH MEALS, Disp: 180 tablet, Rfl: 1    pioglitazone (ACTOS) 30 MG tablet, TAKE 1 TABLET BY MOUTH EVERY DAY, Disp: 90 tablet, Rfl: 1    Insulin Pen Needle 32G X 4 MM MISC, 1 each by Does not apply route daily, Disp: 100 each, Rfl: 3    atenolol (TENORMIN) 50 MG tablet, TAKE 1 TABLET BY MOUTH EVERY DAY, Disp: 90 tablet, Rfl: 1    pantoprazole (PROTONIX) 40 MG tablet, TAKE 1 TABLET BY MOUTH EVERY DAY, Disp: 90 tablet, Rfl: 1    Multiple Vitamin (MULTI-VITAMIN DAILY PO), Take by mouth daily, Disp: , Rfl:     amLODIPine (NORVASC) 5 MG tablet, Take 1 tablet by mouth daily, Disp: 90 tablet, Rfl: 1    aspirin 81 MG tablet, Take 81 mg by mouth daily, Disp: , Rfl:     Krill Oil 350 MG CAPS, Take 500 mg by mouth , Disp: , Rfl:       ASSESSMENT PLAN:   Treatment setup and plan reviewed. Port images/CBCT images reviewed. Appropriate laboratory work was reviewed. Treatment side effects and toxicities reviewed with the patient, and appropriate management was advised.  Will continue radiation treatment as planned, and recommend patient contact us if they have any questions or concerns. Patient was advised to use Ativan for a few days to help relax her and sleep. Patient was also advised to use Tylenol if needed for pain. She is recommended to continue using her moisturizing creams on her skin for skin care. Electronically signed by Mando Narayanan MD on 2/24/2021 at 1:34 PM      Drugs Prescribed:  New Prescriptions    No medications on file       Other Orders Placed:  No orders of the defined types were placed in this encounter. (4) walks frequently

## 2025-06-12 DIAGNOSIS — E11.65 TYPE 2 DIABETES MELLITUS WITH HYPERGLYCEMIA, WITH LONG-TERM CURRENT USE OF INSULIN (HCC): ICD-10-CM

## 2025-06-12 DIAGNOSIS — R25.2 MUSCLE CRAMPS: ICD-10-CM

## 2025-06-12 DIAGNOSIS — Z79.4 TYPE 2 DIABETES MELLITUS WITH HYPERGLYCEMIA, WITH LONG-TERM CURRENT USE OF INSULIN (HCC): ICD-10-CM

## 2025-06-12 NOTE — TELEPHONE ENCOUNTER
Love Rosa is calling to request a refill on the following medication(s):    Medication Request:  Requested Prescriptions     Pending Prescriptions Disp Refills    Continuous Glucose  (FREESTYLE VENECIA 14 DAY READER) HERBER 1 each 1     Sig: Use as directed    Continuous Glucose Sensor (FREESTYLE VENECIA 2 SENSOR) MISC 2 each 0     Sig: USE AS DIRECTED TO MONITOR BLOOD SUGAR    tiZANidine (ZANAFLEX) 4 MG tablet 10 tablet 0     Sig: Take 1 tablet by mouth nightly as needed (muscle spasm)    Insulin Pen Needle (BD PEN NEEDLE ROSALINO 2ND GEN) 32G X 4 MM MISC 100 each 1       Last Visit Date (If Applicable):  5/5/2025    Next Visit Date:    8/5/2025

## 2025-06-13 DIAGNOSIS — I10 ESSENTIAL HYPERTENSION: ICD-10-CM

## 2025-06-13 DIAGNOSIS — Z79.4 TYPE 2 DIABETES MELLITUS WITH HYPERGLYCEMIA, WITH LONG-TERM CURRENT USE OF INSULIN (HCC): ICD-10-CM

## 2025-06-13 DIAGNOSIS — E11.65 TYPE 2 DIABETES MELLITUS WITH HYPERGLYCEMIA, WITH LONG-TERM CURRENT USE OF INSULIN (HCC): ICD-10-CM

## 2025-06-13 RX ORDER — FLASH GLUCOSE SCANNING READER
EACH MISCELLANEOUS
Qty: 1 EACH | Refills: 1 | Status: SHIPPED | OUTPATIENT
Start: 2025-06-13

## 2025-06-13 RX ORDER — ATENOLOL 50 MG/1
50 TABLET ORAL DAILY
Qty: 90 TABLET | Refills: 1 | Status: SHIPPED | OUTPATIENT
Start: 2025-06-13

## 2025-06-13 RX ORDER — PEN NEEDLE, DIABETIC 32GX 5/32"
NEEDLE, DISPOSABLE MISCELLANEOUS
Qty: 100 EACH | Refills: 1 | Status: SHIPPED | OUTPATIENT
Start: 2025-06-13

## 2025-06-13 RX ORDER — AMLODIPINE BESYLATE 5 MG/1
5 TABLET ORAL DAILY
Qty: 90 TABLET | Refills: 1 | Status: SHIPPED | OUTPATIENT
Start: 2025-06-13

## 2025-06-13 RX ORDER — TIRZEPATIDE 5 MG/.5ML
INJECTION, SOLUTION SUBCUTANEOUS
Qty: 6 ML | Refills: 1 | Status: SHIPPED | OUTPATIENT
Start: 2025-06-13

## 2025-06-13 NOTE — TELEPHONE ENCOUNTER
Love Rosa is calling to request a refill on the following medication(s):    Medication Request:  Requested Prescriptions     Pending Prescriptions Disp Refills    atenolol (TENORMIN) 50 MG tablet [Pharmacy Med Name: ATENOLOL 50 MG TABLET] 90 tablet 0     Sig: TAKE 1 TABLET BY MOUTH EVERY DAY    MOUNJARO 5 MG/0.5ML SOAJ pen [Pharmacy Med Name: MOUNJARO 5 MG/0.5 ML PEN]       Sig: INJECT 5 MG SUBCUTANEOUSLY EVERY 7 DAYS    amLODIPine (NORVASC) 5 MG tablet [Pharmacy Med Name: AMLODIPINE BESYLATE 5 MG TAB] 90 tablet 1     Sig: TAKE 1 TABLET BY MOUTH EVERY DAY       Last Visit Date (If Applicable):  5/5/2025    Next Visit Date:    8/5/2025

## 2025-07-08 ENCOUNTER — E-VISIT (OUTPATIENT)
Dept: PRIMARY CARE CLINIC | Age: 70
End: 2025-07-08
Payer: MEDICARE

## 2025-07-08 DIAGNOSIS — H92.09 OTALGIA, UNSPECIFIED LATERALITY: Primary | ICD-10-CM

## 2025-07-08 PROCEDURE — 99422 OL DIG E/M SVC 11-20 MIN: CPT | Performed by: NURSE PRACTITIONER

## 2025-07-08 RX ORDER — AZITHROMYCIN 250 MG/1
TABLET, FILM COATED ORAL
Qty: 1 PACKET | Refills: 0 | Status: SHIPPED | OUTPATIENT
Start: 2025-07-08 | End: 2025-07-18

## 2025-07-08 ASSESSMENT — LIFESTYLE VARIABLES
PACKS_PER_DAY: 2
SMOKING_STATUS: NO, I'M A FORMER SMOKER
SMOKING_YEARS: 30

## 2025-07-08 NOTE — PROGRESS NOTES
Love Rosa (1955) initiated an asynchronous digital communication through [employer].    HPI: per patient questionnaire     Exam: not applicable    Diagnoses and all orders for this visit:  Diagnoses and all orders for this visit:    Otalgia, unspecified laterality    Other orders  -     azithromycin (ZITHROMAX) 250 MG tablet; 2 tablets now then 1 daily until gone.    Patient is a diabetic with an elevated A1c in March.  Will avoid steroids.  Sent in antibiotics due to her age.  Supportive care measures provided.  Follow-up with PCP as needed      18 minutes were spent on the digital evaluation and management of this patient.    Cindy Obregon, APRN - CNP

## 2025-07-11 DIAGNOSIS — I25.10 CORONARY ARTERY CALCIFICATION: ICD-10-CM

## 2025-07-11 RX ORDER — ROSUVASTATIN CALCIUM 5 MG/1
5 TABLET, COATED ORAL NIGHTLY
Qty: 90 TABLET | Refills: 1 | Status: SHIPPED | OUTPATIENT
Start: 2025-07-11

## 2025-07-11 NOTE — TELEPHONE ENCOUNTER
Love Rosa is calling to request a refill on the following medication(s):    Medication Request:  Requested Prescriptions     Pending Prescriptions Disp Refills    rosuvastatin (CRESTOR) 5 MG tablet [Pharmacy Med Name: ROSUVASTATIN CALCIUM 5 MG TAB] 90 tablet 0     Sig: TAKE 1 TABLET BY MOUTH EVERY DAY AT NIGHT       Last Visit Date (If Applicable):  5/5/2025    Next Visit Date:    8/5/2025

## 2025-08-05 ENCOUNTER — OFFICE VISIT (OUTPATIENT)
Dept: FAMILY MEDICINE CLINIC | Age: 70
End: 2025-08-05

## 2025-08-05 VITALS
WEIGHT: 253 LBS | DIASTOLIC BLOOD PRESSURE: 96 MMHG | HEIGHT: 66 IN | OXYGEN SATURATION: 98 % | HEART RATE: 79 BPM | SYSTOLIC BLOOD PRESSURE: 164 MMHG | BODY MASS INDEX: 40.66 KG/M2

## 2025-08-05 DIAGNOSIS — E55.9 VITAMIN D DEFICIENCY: ICD-10-CM

## 2025-08-05 DIAGNOSIS — I25.10 CORONARY ARTERY CALCIFICATION: ICD-10-CM

## 2025-08-05 DIAGNOSIS — I10 ESSENTIAL HYPERTENSION: ICD-10-CM

## 2025-08-05 DIAGNOSIS — Z79.4 TYPE 2 DIABETES MELLITUS WITH HYPERGLYCEMIA, WITH LONG-TERM CURRENT USE OF INSULIN (HCC): Primary | ICD-10-CM

## 2025-08-05 DIAGNOSIS — E11.65 TYPE 2 DIABETES MELLITUS WITH HYPERGLYCEMIA, WITH LONG-TERM CURRENT USE OF INSULIN (HCC): Primary | ICD-10-CM

## 2025-08-05 DIAGNOSIS — Z87.891 PERSONAL HISTORY OF TOBACCO USE: ICD-10-CM

## 2025-08-05 DIAGNOSIS — R25.2 MUSCLE CRAMPS: ICD-10-CM

## 2025-08-05 DIAGNOSIS — K21.9 GASTROESOPHAGEAL REFLUX DISEASE, UNSPECIFIED WHETHER ESOPHAGITIS PRESENT: ICD-10-CM

## 2025-08-05 LAB — HBA1C MFR BLD: 8.7 %

## 2025-08-05 RX ORDER — MELOXICAM 15 MG/1
15 TABLET ORAL DAILY
COMMUNITY
Start: 2025-07-23 | End: 2025-08-05 | Stop reason: SDUPTHER

## 2025-08-05 RX ORDER — PANTOPRAZOLE SODIUM 40 MG/1
40 TABLET, DELAYED RELEASE ORAL DAILY
Qty: 90 TABLET | Refills: 1 | Status: SHIPPED | OUTPATIENT
Start: 2025-08-05

## 2025-08-05 RX ORDER — ATENOLOL 50 MG/1
50 TABLET ORAL DAILY
Qty: 90 TABLET | Refills: 1 | Status: SHIPPED | OUTPATIENT
Start: 2025-08-05

## 2025-08-05 RX ORDER — DAPAGLIFLOZIN 5 MG/1
5 TABLET, FILM COATED ORAL EVERY MORNING
Qty: 30 TABLET | Refills: 1 | Status: SHIPPED | OUTPATIENT
Start: 2025-08-05

## 2025-08-05 RX ORDER — MELOXICAM 15 MG/1
15 TABLET ORAL DAILY
Qty: 30 TABLET | Refills: 0 | Status: SHIPPED | OUTPATIENT
Start: 2025-08-05 | End: 2025-09-04

## 2025-08-05 RX ORDER — ROSUVASTATIN CALCIUM 5 MG/1
5 TABLET, COATED ORAL NIGHTLY
Qty: 90 TABLET | Refills: 1 | Status: SHIPPED | OUTPATIENT
Start: 2025-08-05

## 2025-08-05 RX ORDER — CHOLECALCIFEROL (VITAMIN D3) 50 MCG
2000 TABLET ORAL DAILY
Qty: 90 TABLET | Refills: 0 | Status: SHIPPED | OUTPATIENT
Start: 2025-08-05

## 2025-08-05 RX ORDER — AMLODIPINE BESYLATE 5 MG/1
5 TABLET ORAL DAILY
Qty: 90 TABLET | Refills: 1 | Status: SHIPPED | OUTPATIENT
Start: 2025-08-05

## 2025-08-05 RX ORDER — TIRZEPATIDE 5 MG/.5ML
INJECTION, SOLUTION SUBCUTANEOUS
Qty: 6 ML | Refills: 1 | Status: CANCELLED | OUTPATIENT
Start: 2025-08-05

## 2025-08-05 RX ORDER — INSULIN DEGLUDEC 200 U/ML
20 INJECTION, SOLUTION SUBCUTANEOUS DAILY
Qty: 9 ADJUSTABLE DOSE PRE-FILLED PEN SYRINGE | Refills: 1 | Status: SHIPPED | OUTPATIENT
Start: 2025-08-05

## 2025-08-05 SDOH — ECONOMIC STABILITY: FOOD INSECURITY: WITHIN THE PAST 12 MONTHS, YOU WORRIED THAT YOUR FOOD WOULD RUN OUT BEFORE YOU GOT MONEY TO BUY MORE.: NEVER TRUE

## 2025-08-05 SDOH — ECONOMIC STABILITY: FOOD INSECURITY: WITHIN THE PAST 12 MONTHS, THE FOOD YOU BOUGHT JUST DIDN'T LAST AND YOU DIDN'T HAVE MONEY TO GET MORE.: NEVER TRUE

## 2025-08-05 ASSESSMENT — PATIENT HEALTH QUESTIONNAIRE - PHQ9
SUM OF ALL RESPONSES TO PHQ QUESTIONS 1-9: 0
SUM OF ALL RESPONSES TO PHQ QUESTIONS 1-9: 0
1. LITTLE INTEREST OR PLEASURE IN DOING THINGS: NOT AT ALL
SUM OF ALL RESPONSES TO PHQ QUESTIONS 1-9: 0
SUM OF ALL RESPONSES TO PHQ QUESTIONS 1-9: 0
2. FEELING DOWN, DEPRESSED OR HOPELESS: NOT AT ALL

## 2025-08-05 ASSESSMENT — ENCOUNTER SYMPTOMS
NAUSEA: 0
COUGH: 0
CHEST TIGHTNESS: 0
CONSTIPATION: 0
DIARRHEA: 0
WHEEZING: 0
EYE DISCHARGE: 0
SHORTNESS OF BREATH: 0
SORE THROAT: 0
ABDOMINAL PAIN: 0
VOMITING: 0

## 2025-08-25 DIAGNOSIS — E11.65 TYPE 2 DIABETES MELLITUS WITH HYPERGLYCEMIA, WITH LONG-TERM CURRENT USE OF INSULIN (HCC): ICD-10-CM

## 2025-08-25 DIAGNOSIS — Z79.4 TYPE 2 DIABETES MELLITUS WITH HYPERGLYCEMIA, WITH LONG-TERM CURRENT USE OF INSULIN (HCC): ICD-10-CM

## (undated) DEVICE — SUTURE PROL SZ 3-0 L30IN NONABSORBABLE BLU L26MM SH 1/2 CIR 8832H

## (undated) DEVICE — TOWEL,OR,DSP,ST,BLUE,DLX,XR,4/PK,20PK/CS: Brand: MEDLINE

## (undated) DEVICE — DRAPE,REIN 53X77,STERILE: Brand: MEDLINE

## (undated) DEVICE — APPLIER LIG CLP M L11IN TI STR RNG HNDL FOR 20 CLP DISP

## (undated) DEVICE — SUTURE PDS II SZ 3-0 L27IN ABSRB VLT L26MM SH 1/2 CIR Z316H

## (undated) DEVICE — Device: Brand: DEFENDO VALVE AND CONNECTOR KIT

## (undated) DEVICE — BLADE ES ELASTOMERIC COAT INSUL DURABLE BEND UPTO 90DEG

## (undated) DEVICE — FORCEPS BX L240CM JAW DIA2.2MM RAD JAW 4 HOT DISP

## (undated) DEVICE — GLOVE SURG 8 11.7IN BEAD CUF LIGHT BRN SENSICARE LTX FREE

## (undated) DEVICE — GARMENT,MEDLINE,DVT,INT,CALF,MED, GEN2: Brand: MEDLINE

## (undated) DEVICE — BLANKET WRM W40.2XL55.9IN IORT LO BODY + MISTRAL AIR

## (undated) DEVICE — STAZ ENDO KIT: Brand: MEDLINE INDUSTRIES, INC.

## (undated) DEVICE — SUTURE PERMAHAND SZ 3-0 L30IN NONABSORBABLE BLK SH L26MM K832H

## (undated) DEVICE — TUBING, SUCTION, 1/4" X 12', STRAIGHT: Brand: MEDLINE

## (undated) DEVICE — NEEDLE HYPO 25GA L1.5IN BLU POLYPR HUB S STL REG BVL STR

## (undated) DEVICE — YANKAUER,FLEXIBLE HANDLE,REGLR CAPACITY: Brand: MEDLINE INDUSTRIES, INC.

## (undated) DEVICE — PREP-RESISTANT MARKER W/ RULER: Brand: MEDLINE INDUSTRIES, INC.

## (undated) DEVICE — MEDICINE CUP, GRADUATED, STER: Brand: MEDLINE

## (undated) DEVICE — SURGICAL PROCEDURE KIT BASIN MAJ SET UP

## (undated) DEVICE — SUTURE VCRL SZ 2-0 L27IN ABSRB UD L26MM SH 1/2 CIR J417H

## (undated) DEVICE — GOWN,AURORA,NONREINFORCED,LARGE: Brand: MEDLINE

## (undated) DEVICE — SUTURE VCRL SZ 3-0 L27IN ABSRB UD L26MM SH 1/2 CIR J416H

## (undated) DEVICE — ADAPTER TBNG LUER STUB 15 GA INTMED

## (undated) DEVICE — SYRINGE MED 50ML LUERLOCK TIP

## (undated) DEVICE — SYRINGE, LUER LOCK, 10ML: Brand: MEDLINE

## (undated) DEVICE — INTENDED FOR TISSUE SEPARATION, AND OTHER PROCEDURES THAT REQUIRE A SHARP SURGICAL BLADE TO PUNCTURE OR CUT.: Brand: BARD-PARKER ® CARBON RIB-BACK BLADES

## (undated) DEVICE — SUTURE MCRYL SZ 5-0 L18IN ABSRB UD PC-3 L16MM 3/8 CIR Y844G

## (undated) DEVICE — CUP MED 1OZ CLR POLYPR FEED GRAD W/O LID

## (undated) DEVICE — GRID BX L4.65IN RADLUC FOR SAFE IMAG TRNSPRT PROC BRST SPEC

## (undated) DEVICE — FORCEPS BX L240CM JAW DIA2.4MM ORNG L CAP W/ NDL DISP RAD

## (undated) DEVICE — SPONGE LAP W18XL18IN WHT COT 4 PLY FLD STRUNG RADPQ DISP ST

## (undated) DEVICE — CO2 CANNULA,SUPERSOFT, ADLT,7'O2,7'CO2: Brand: MEDLINE

## (undated) DEVICE — ADHESIVE SKIN CLSR 0.7ML TOP DERMBND ADV

## (undated) DEVICE — GAUZE,SPONGE,4"X4",16PLY,STRL,LF,10/TRAY: Brand: MEDLINE

## (undated) DEVICE — GLOVE SURG SZ 65 CRM LTX FREE POLYISOPRENE POLYMER BEAD ANTI

## (undated) DEVICE — JELLY,LUBE,STERILE,FLIP TOP,TUBE,2-OZ: Brand: MEDLINE

## (undated) DEVICE — CHLORAPREP 26ML ORANGE